# Patient Record
Sex: FEMALE | Race: WHITE | Employment: OTHER | URBAN - METROPOLITAN AREA
[De-identification: names, ages, dates, MRNs, and addresses within clinical notes are randomized per-mention and may not be internally consistent; named-entity substitution may affect disease eponyms.]

---

## 2017-02-02 ENCOUNTER — TELEPHONE (OUTPATIENT)
Dept: ORTHOPEDIC SURGERY | Age: 77
End: 2017-02-02

## 2017-02-02 ENCOUNTER — OFFICE VISIT (OUTPATIENT)
Dept: ORTHOPEDIC SURGERY | Age: 77
End: 2017-02-02

## 2017-02-02 VITALS
SYSTOLIC BLOOD PRESSURE: 128 MMHG | HEART RATE: 97 BPM | HEIGHT: 62 IN | DIASTOLIC BLOOD PRESSURE: 84 MMHG | WEIGHT: 161 LBS | BODY MASS INDEX: 29.63 KG/M2 | RESPIRATION RATE: 16 BRPM | TEMPERATURE: 98.1 F

## 2017-02-02 DIAGNOSIS — Z96.612 S/P REVERSE TOTAL SHOULDER ARTHROPLASTY, LEFT: Primary | ICD-10-CM

## 2017-02-02 DIAGNOSIS — M19.019 ARTHRITIS, SHOULDER REGION: ICD-10-CM

## 2017-02-02 PROBLEM — Z96.619 S/P REVERSE TOTAL SHOULDER ARTHROPLASTY: Status: ACTIVE | Noted: 2017-02-02

## 2017-02-02 PROCEDURE — 1090F PRES/ABSN URINE INCON ASSESS: CPT | Performed by: PHYSICIAN ASSISTANT

## 2017-02-02 PROCEDURE — G8400 PT W/DXA NO RESULTS DOC: HCPCS | Performed by: PHYSICIAN ASSISTANT

## 2017-02-02 PROCEDURE — 3017F COLORECTAL CA SCREEN DOC REV: CPT | Performed by: PHYSICIAN ASSISTANT

## 2017-02-02 PROCEDURE — 73030 X-RAY EXAM OF SHOULDER: CPT | Performed by: PHYSICIAN ASSISTANT

## 2017-02-02 PROCEDURE — G8420 CALC BMI NORM PARAMETERS: HCPCS | Performed by: PHYSICIAN ASSISTANT

## 2017-02-02 PROCEDURE — 99214 OFFICE O/P EST MOD 30 MIN: CPT | Performed by: PHYSICIAN ASSISTANT

## 2017-02-02 PROCEDURE — 1123F ACP DISCUSS/DSCN MKR DOCD: CPT | Performed by: PHYSICIAN ASSISTANT

## 2017-02-02 PROCEDURE — G8484 FLU IMMUNIZE NO ADMIN: HCPCS | Performed by: PHYSICIAN ASSISTANT

## 2017-02-02 PROCEDURE — 4040F PNEUMOC VAC/ADMIN/RCVD: CPT | Performed by: PHYSICIAN ASSISTANT

## 2017-02-02 PROCEDURE — G8428 CUR MEDS NOT DOCUMENT: HCPCS | Performed by: PHYSICIAN ASSISTANT

## 2017-02-02 PROCEDURE — 1036F TOBACCO NON-USER: CPT | Performed by: PHYSICIAN ASSISTANT

## 2017-02-16 ENCOUNTER — HOSPITAL ENCOUNTER (OUTPATIENT)
Dept: CT IMAGING | Age: 77
Discharge: HOME | End: 2017-02-16
Attending: PHYSICIAN ASSISTANT | Admitting: PHYSICIAN ASSISTANT

## 2017-02-16 DIAGNOSIS — M19.90 OSTEOARTHRITIS: ICD-10-CM

## 2017-02-16 DIAGNOSIS — M19.019 ARTHRITIS, SHOULDER REGION: ICD-10-CM

## 2017-03-29 ENCOUNTER — OFFICE VISIT (OUTPATIENT)
Dept: ORTHOPEDIC SURGERY | Age: 77
End: 2017-03-29

## 2017-03-29 VITALS
SYSTOLIC BLOOD PRESSURE: 105 MMHG | HEART RATE: 103 BPM | HEIGHT: 62 IN | BODY MASS INDEX: 29.63 KG/M2 | WEIGHT: 161 LBS | DIASTOLIC BLOOD PRESSURE: 65 MMHG | TEMPERATURE: 96 F

## 2017-03-29 DIAGNOSIS — M19.011 ARTHRITIS OF RIGHT SHOULDER REGION: Primary | ICD-10-CM

## 2017-03-29 PROCEDURE — G8400 PT W/DXA NO RESULTS DOC: HCPCS | Performed by: ORTHOPAEDIC SURGERY

## 2017-03-29 PROCEDURE — G8427 DOCREV CUR MEDS BY ELIG CLIN: HCPCS | Performed by: ORTHOPAEDIC SURGERY

## 2017-03-29 PROCEDURE — 1036F TOBACCO NON-USER: CPT | Performed by: ORTHOPAEDIC SURGERY

## 2017-03-29 PROCEDURE — 1090F PRES/ABSN URINE INCON ASSESS: CPT | Performed by: ORTHOPAEDIC SURGERY

## 2017-03-29 PROCEDURE — 1123F ACP DISCUSS/DSCN MKR DOCD: CPT | Performed by: ORTHOPAEDIC SURGERY

## 2017-03-29 PROCEDURE — G8484 FLU IMMUNIZE NO ADMIN: HCPCS | Performed by: ORTHOPAEDIC SURGERY

## 2017-03-29 PROCEDURE — G8420 CALC BMI NORM PARAMETERS: HCPCS | Performed by: ORTHOPAEDIC SURGERY

## 2017-03-29 PROCEDURE — 99214 OFFICE O/P EST MOD 30 MIN: CPT | Performed by: ORTHOPAEDIC SURGERY

## 2017-03-29 PROCEDURE — 4040F PNEUMOC VAC/ADMIN/RCVD: CPT | Performed by: ORTHOPAEDIC SURGERY

## 2017-03-29 PROCEDURE — 3017F COLORECTAL CA SCREEN DOC REV: CPT | Performed by: ORTHOPAEDIC SURGERY

## 2017-04-05 ENCOUNTER — HOSPITAL ENCOUNTER (OUTPATIENT)
Dept: PREADMISSION TESTING | Age: 77
Discharge: HOME | End: 2017-04-05
Attending: ORTHOPAEDIC SURGERY | Admitting: ORTHOPAEDIC SURGERY

## 2017-04-05 LAB
ANION GAP SERPL CALCULATED.3IONS-SCNC: 15 MMOL/L (ref 3–16)
APTT: 28.8 SEC (ref 21–31.8)
BACTERIA: ABNORMAL /HPF
BASOPHILS ABSOLUTE: 0 K/UL (ref 0–0.2)
BASOPHILS RELATIVE PERCENT: 0.3 %
BILIRUBIN URINE: ABNORMAL
BLOOD, URINE: NEGATIVE
BUN BLDV-MCNC: 22 MG/DL (ref 7–20)
CALCIUM SERPL-MCNC: 9.4 MG/DL (ref 8.3–10.6)
CASTS: ABNORMAL /LPF
CHLORIDE BLD-SCNC: 100 MMOL/L (ref 99–110)
CLARITY: ABNORMAL
CO2: 25 MMOL/L (ref 21–32)
COLOR: YELLOW
COMMENT UA: ABNORMAL
CREAT SERPL-MCNC: 0.8 MG/DL (ref 0.6–1.2)
EKG ATRIAL RATE: 91 BPM
EKG DIAGNOSIS: NORMAL
EKG P AXIS: 5 DEGREES
EKG P-R INTERVAL: 100 MS
EKG Q-T INTERVAL: 394 MS
EKG QRS DURATION: 82 MS
EKG QTC CALCULATION (BAZETT): 484 MS
EKG R AXIS: -38 DEGREES
EKG T AXIS: 36 DEGREES
EKG VENTRICULAR RATE: 91 BPM
EOSINOPHILS ABSOLUTE: 0.1 K/UL (ref 0–0.6)
EOSINOPHILS RELATIVE PERCENT: 2 %
EPITHELIAL CELLS, UA: 10 /HPF (ref 0–5)
ESTIMATED AVERAGE GLUCOSE: 119.8 MG/DL
GFR AFRICAN AMERICAN: >60
GFR NON-AFRICAN AMERICAN: >60
GLUCOSE BLD-MCNC: 108 MG/DL (ref 70–99)
GLUCOSE URINE: NEGATIVE MG/DL
HBA1C MFR BLD: 5.8 %
HCT VFR BLD CALC: 39.1 % (ref 36–48)
HEMOGLOBIN: 12.9 G/DL (ref 12–16)
INR BLD: 0.96 (ref 0.85–1.15)
KETONES, URINE: NEGATIVE MG/DL
LEUKOCYTE ESTERASE, URINE: ABNORMAL
LYMPHOCYTES ABSOLUTE: 2 K/UL (ref 1–5.1)
LYMPHOCYTES RELATIVE PERCENT: 37.6 %
MCH RBC QN AUTO: 35.6 PG (ref 26–34)
MCHC RBC AUTO-ENTMCNC: 33 G/DL (ref 31–36)
MCV RBC AUTO: 107.9 FL (ref 80–100)
MICROSCOPIC EXAMINATION: YES
MONOCYTES ABSOLUTE: 0.5 K/UL (ref 0–1.3)
MONOCYTES RELATIVE PERCENT: 9.3 %
NEUTROPHILS ABSOLUTE: 2.7 K/UL (ref 1.7–7.7)
NEUTROPHILS RELATIVE PERCENT: 50.8 %
NITRITE, URINE: NEGATIVE
PDW BLD-RTO: 15.4 % (ref 12.4–15.4)
PH UA: 5.5
PLATELET # BLD: 185 K/UL (ref 135–450)
PMV BLD AUTO: 7.7 FL (ref 5–10.5)
POTASSIUM SERPL-SCNC: 4.9 MMOL/L (ref 3.5–5.1)
PROTEIN UA: NEGATIVE MG/DL
PROTHROMBIN TIME: 10.8 SEC (ref 9.6–13)
RBC # BLD: 3.63 M/UL (ref 4–5.2)
RBC UA: 2 /HPF (ref 0–4)
SEDIMENTATION RATE, ERYTHROCYTE: 40 MM/HR (ref 0–30)
SODIUM BLD-SCNC: 140 MMOL/L (ref 136–145)
SPECIFIC GRAVITY UA: >1.03
URINE REFLEX TO CULTURE: YES
URINE TYPE: ABNORMAL
UROBILINOGEN, URINE: 0.2 E.U./DL
WBC # BLD: 5.3 K/UL (ref 4–11)
WBC UA: 12 /HPF (ref 0–5)

## 2017-04-05 PROCEDURE — 93010 ELECTROCARDIOGRAM REPORT: CPT | Performed by: INTERNAL MEDICINE

## 2017-04-06 ENCOUNTER — TELEPHONE (OUTPATIENT)
Dept: ORTHOPEDIC SURGERY | Age: 77
End: 2017-04-06

## 2017-04-06 ENCOUNTER — SURG/PROC ORDERS (OUTPATIENT)
Dept: ANESTHESIOLOGY | Age: 77
End: 2017-04-06

## 2017-04-06 ENCOUNTER — PAT TELEPHONE (OUTPATIENT)
Dept: PREADMISSION TESTING | Age: 77
End: 2017-04-06

## 2017-04-06 VITALS — WEIGHT: 161 LBS | BODY MASS INDEX: 29.63 KG/M2 | HEIGHT: 62 IN

## 2017-04-06 LAB
ANTIBODY IDENTIFICATION: NORMAL
MRSA SCREEN RT-PCR: NORMAL

## 2017-04-06 RX ORDER — OXYCODONE HYDROCHLORIDE 5 MG/1
10 TABLET ORAL ONCE
Status: CANCELLED | OUTPATIENT
Start: 2017-04-11

## 2017-04-06 RX ORDER — OXYCODONE HYDROCHLORIDE AND ACETAMINOPHEN 5; 325 MG/1; MG/1
1 TABLET ORAL EVERY 4 HOURS PRN
Status: ON HOLD | COMMUNITY
End: 2017-05-02 | Stop reason: HOSPADM

## 2017-04-06 RX ORDER — SODIUM CHLORIDE 0.9 % (FLUSH) 0.9 %
10 SYRINGE (ML) INJECTION PRN
Status: CANCELLED | OUTPATIENT
Start: 2017-04-06

## 2017-04-06 RX ORDER — SODIUM CHLORIDE 9 MG/ML
INJECTION, SOLUTION INTRAVENOUS CONTINUOUS
Status: CANCELLED | OUTPATIENT
Start: 2017-04-06

## 2017-04-06 RX ORDER — CYCLOBENZAPRINE HCL 5 MG
5 TABLET ORAL 3 TIMES DAILY
COMMUNITY
End: 2018-08-02 | Stop reason: ALTCHOICE

## 2017-04-06 RX ORDER — SODIUM CHLORIDE 0.9 % (FLUSH) 0.9 %
10 SYRINGE (ML) INJECTION EVERY 12 HOURS SCHEDULED
Status: CANCELLED | OUTPATIENT
Start: 2017-04-06

## 2017-04-07 LAB — URINE CULTURE, ROUTINE: NORMAL

## 2017-04-11 ENCOUNTER — HOSPITAL ENCOUNTER (OUTPATIENT)
Dept: SURGERY | Age: 77
Discharge: HOME | End: 2017-04-06
Admitting: ORTHOPAEDIC SURGERY

## 2017-04-11 ENCOUNTER — HOSPITAL ENCOUNTER (OUTPATIENT)
Dept: SURGERY | Age: 77
Discharge: HOME | End: 2017-04-30
Attending: ORTHOPAEDIC SURGERY | Admitting: ORTHOPAEDIC SURGERY

## 2017-04-12 LAB — BLOOD BANK REF CASE: NORMAL

## 2017-04-25 ENCOUNTER — HOSPITAL ENCOUNTER (OUTPATIENT)
Dept: PREADMISSION TESTING | Age: 77
Discharge: HOME | End: 2017-04-25
Attending: ORTHOPAEDIC SURGERY | Admitting: ORTHOPAEDIC SURGERY

## 2017-04-25 LAB
ANION GAP SERPL CALCULATED.3IONS-SCNC: 18 MMOL/L (ref 3–16)
APTT: 29.3 SEC (ref 21–31.8)
BACTERIA: ABNORMAL /HPF
BASOPHILS ABSOLUTE: 0 K/UL (ref 0–0.2)
BASOPHILS RELATIVE PERCENT: 0.4 %
BILIRUBIN URINE: NEGATIVE
BLOOD, URINE: NEGATIVE
BUN BLDV-MCNC: 20 MG/DL (ref 7–20)
CALCIUM SERPL-MCNC: 9.4 MG/DL (ref 8.3–10.6)
CHLORIDE BLD-SCNC: 100 MMOL/L (ref 99–110)
CLARITY: ABNORMAL
CO2: 23 MMOL/L (ref 21–32)
COLOR: YELLOW
COMMENT UA: ABNORMAL
CREAT SERPL-MCNC: 0.9 MG/DL (ref 0.6–1.2)
EOSINOPHILS ABSOLUTE: 0.1 K/UL (ref 0–0.6)
EOSINOPHILS RELATIVE PERCENT: 2.3 %
EPITHELIAL CELLS, UA: 3 /HPF (ref 0–5)
ESTIMATED AVERAGE GLUCOSE: 119.8 MG/DL
GFR AFRICAN AMERICAN: >60
GFR NON-AFRICAN AMERICAN: >60
GLUCOSE BLD-MCNC: 107 MG/DL (ref 70–99)
GLUCOSE URINE: NEGATIVE MG/DL
HBA1C MFR BLD: 5.8 %
HCT VFR BLD CALC: 39.4 % (ref 36–48)
HEMOGLOBIN: 13 G/DL (ref 12–16)
INR BLD: 1 (ref 0.85–1.15)
KETONES, URINE: NEGATIVE MG/DL
LEUKOCYTE ESTERASE, URINE: ABNORMAL
LYMPHOCYTES ABSOLUTE: 2.3 K/UL (ref 1–5.1)
LYMPHOCYTES RELATIVE PERCENT: 40.5 %
MCH RBC QN AUTO: 35.7 PG (ref 26–34)
MCHC RBC AUTO-ENTMCNC: 32.9 G/DL (ref 31–36)
MCV RBC AUTO: 108.5 FL (ref 80–100)
MICROSCOPIC EXAMINATION: YES
MONOCYTES ABSOLUTE: 0.5 K/UL (ref 0–1.3)
MONOCYTES RELATIVE PERCENT: 8.4 %
NEUTROPHILS ABSOLUTE: 2.7 K/UL (ref 1.7–7.7)
NEUTROPHILS RELATIVE PERCENT: 48.4 %
NITRITE, URINE: POSITIVE
PDW BLD-RTO: 15.3 % (ref 12.4–15.4)
PH UA: 5.5
PLATELET # BLD: 186 K/UL (ref 135–450)
PMV BLD AUTO: 8.3 FL (ref 5–10.5)
POTASSIUM SERPL-SCNC: 4.4 MMOL/L (ref 3.5–5.1)
PROTEIN UA: NEGATIVE MG/DL
PROTHROMBIN TIME: 11.3 SEC (ref 9.6–13)
RBC # BLD: 3.63 M/UL (ref 4–5.2)
RBC UA: 3 /HPF (ref 0–4)
SEDIMENTATION RATE, ERYTHROCYTE: 29 MM/HR (ref 0–30)
SODIUM BLD-SCNC: 141 MMOL/L (ref 136–145)
SPECIFIC GRAVITY UA: 1.02
URINE REFLEX TO CULTURE: YES
URINE TYPE: ABNORMAL
UROBILINOGEN, URINE: 0.2 E.U./DL
WBC # BLD: 5.6 K/UL (ref 4–11)
WBC UA: 46 /HPF (ref 0–5)

## 2017-04-26 LAB — MRSA SCREEN RT-PCR: NORMAL

## 2017-04-27 LAB
ORGANISM: ABNORMAL
ORGANISM: ABNORMAL
URINE CULTURE, ROUTINE: ABNORMAL
URINE CULTURE, ROUTINE: ABNORMAL

## 2017-04-28 ENCOUNTER — PAT TELEPHONE (OUTPATIENT)
Dept: PREADMISSION TESTING | Age: 77
End: 2017-04-28

## 2017-04-28 ENCOUNTER — TELEPHONE (OUTPATIENT)
Dept: ORTHOPEDIC SURGERY | Age: 77
End: 2017-04-28

## 2017-04-28 VITALS — BODY MASS INDEX: 25.69 KG/M2 | WEIGHT: 145 LBS | HEIGHT: 63 IN

## 2017-05-01 RX ORDER — PANTOPRAZOLE SODIUM 40 MG/1
40 GRANULE, DELAYED RELEASE ORAL DAILY
COMMUNITY
End: 2019-11-21 | Stop reason: DRUGHIGH

## 2017-05-01 RX ORDER — IPRATROPIUM BROMIDE AND ALBUTEROL SULFATE 2.5; .5 MG/3ML; MG/3ML
1 SOLUTION RESPIRATORY (INHALATION) EVERY 4 HOURS PRN
Status: ON HOLD | COMMUNITY
End: 2019-06-19

## 2017-05-01 RX ORDER — PIOGLITAZONEHYDROCHLORIDE 30 MG/1
30 TABLET ORAL DAILY
COMMUNITY
End: 2018-08-02 | Stop reason: ALTCHOICE

## 2017-05-01 RX ORDER — CIPROFLOXACIN 500 MG/1
500 TABLET, FILM COATED ORAL 2 TIMES DAILY
COMMUNITY
End: 2018-03-01 | Stop reason: ALTCHOICE

## 2017-05-02 PROBLEM — M19.011 ARTHRITIS OF SHOULDER REGION, RIGHT: Status: ACTIVE | Noted: 2017-05-02

## 2017-05-04 ENCOUNTER — TELEPHONE (OUTPATIENT)
Dept: ORTHOPEDIC SURGERY | Age: 77
End: 2017-05-04

## 2017-05-15 ENCOUNTER — TELEPHONE (OUTPATIENT)
Dept: ORTHOPEDIC SURGERY | Age: 77
End: 2017-05-15

## 2017-05-15 ENCOUNTER — OFFICE VISIT (OUTPATIENT)
Dept: ORTHOPEDIC SURGERY | Age: 77
End: 2017-05-15

## 2017-05-15 VITALS — WEIGHT: 153 LBS | BODY MASS INDEX: 27.11 KG/M2 | HEIGHT: 63 IN | TEMPERATURE: 98.1 F | RESPIRATION RATE: 16 BRPM

## 2017-05-15 DIAGNOSIS — Z96.611 S/P REVERSE TOTAL SHOULDER ARTHROPLASTY, RIGHT: Primary | ICD-10-CM

## 2017-05-15 PROCEDURE — 99024 POSTOP FOLLOW-UP VISIT: CPT | Performed by: ORTHOPAEDIC SURGERY

## 2017-06-12 ENCOUNTER — OFFICE VISIT (OUTPATIENT)
Dept: ORTHOPEDIC SURGERY | Age: 77
End: 2017-06-12

## 2017-06-12 VITALS — TEMPERATURE: 97 F

## 2017-06-12 DIAGNOSIS — Z96.611 S/P REVERSE TOTAL SHOULDER ARTHROPLASTY, RIGHT: Primary | ICD-10-CM

## 2017-06-12 PROCEDURE — 99024 POSTOP FOLLOW-UP VISIT: CPT | Performed by: ORTHOPAEDIC SURGERY

## 2017-07-24 ENCOUNTER — OFFICE VISIT (OUTPATIENT)
Dept: ORTHOPEDIC SURGERY | Age: 77
End: 2017-07-24

## 2017-07-24 VITALS — BODY MASS INDEX: 27.11 KG/M2 | HEIGHT: 63 IN | TEMPERATURE: 98.1 F | WEIGHT: 153 LBS

## 2017-07-24 DIAGNOSIS — Z96.611 S/P REVERSE TOTAL SHOULDER ARTHROPLASTY, RIGHT: Primary | ICD-10-CM

## 2017-07-24 PROCEDURE — 99024 POSTOP FOLLOW-UP VISIT: CPT | Performed by: PHYSICIAN ASSISTANT

## 2017-08-02 ENCOUNTER — TELEPHONE (OUTPATIENT)
Dept: ORTHOPEDIC SURGERY | Age: 77
End: 2017-08-02

## 2018-05-25 ENCOUNTER — OFFICE VISIT (OUTPATIENT)
Dept: ORTHOPEDIC SURGERY | Age: 78
End: 2018-05-25

## 2018-05-25 VITALS
DIASTOLIC BLOOD PRESSURE: 87 MMHG | BODY MASS INDEX: 25.76 KG/M2 | WEIGHT: 140 LBS | SYSTOLIC BLOOD PRESSURE: 136 MMHG | HEIGHT: 62 IN | HEART RATE: 76 BPM

## 2018-05-25 DIAGNOSIS — M17.11 PRIMARY OSTEOARTHRITIS OF RIGHT KNEE: Primary | ICD-10-CM

## 2018-05-25 PROCEDURE — 1123F ACP DISCUSS/DSCN MKR DOCD: CPT | Performed by: ORTHOPAEDIC SURGERY

## 2018-05-25 PROCEDURE — G8417 CALC BMI ABV UP PARAM F/U: HCPCS | Performed by: ORTHOPAEDIC SURGERY

## 2018-05-25 PROCEDURE — 1090F PRES/ABSN URINE INCON ASSESS: CPT | Performed by: ORTHOPAEDIC SURGERY

## 2018-05-25 PROCEDURE — 1036F TOBACCO NON-USER: CPT | Performed by: ORTHOPAEDIC SURGERY

## 2018-05-25 PROCEDURE — 4040F PNEUMOC VAC/ADMIN/RCVD: CPT | Performed by: ORTHOPAEDIC SURGERY

## 2018-05-25 PROCEDURE — G8427 DOCREV CUR MEDS BY ELIG CLIN: HCPCS | Performed by: ORTHOPAEDIC SURGERY

## 2018-05-25 PROCEDURE — 20610 DRAIN/INJ JOINT/BURSA W/O US: CPT | Performed by: ORTHOPAEDIC SURGERY

## 2018-05-25 PROCEDURE — G8400 PT W/DXA NO RESULTS DOC: HCPCS | Performed by: ORTHOPAEDIC SURGERY

## 2018-05-25 PROCEDURE — 99214 OFFICE O/P EST MOD 30 MIN: CPT | Performed by: ORTHOPAEDIC SURGERY

## 2018-05-25 RX ORDER — BETAMETHASONE SODIUM PHOSPHATE AND BETAMETHASONE ACETATE 3; 3 MG/ML; MG/ML
12 INJECTION, SUSPENSION INTRA-ARTICULAR; INTRALESIONAL; INTRAMUSCULAR; SOFT TISSUE ONCE
Status: DISCONTINUED | OUTPATIENT
Start: 2018-05-25 | End: 2018-10-26 | Stop reason: HOSPADM

## 2018-08-02 ENCOUNTER — OFFICE VISIT (OUTPATIENT)
Dept: INTERNAL MEDICINE CLINIC | Age: 78
End: 2018-08-02

## 2018-08-02 VITALS
DIASTOLIC BLOOD PRESSURE: 80 MMHG | WEIGHT: 160 LBS | HEIGHT: 59 IN | BODY MASS INDEX: 32.25 KG/M2 | SYSTOLIC BLOOD PRESSURE: 102 MMHG | HEART RATE: 78 BPM

## 2018-08-02 DIAGNOSIS — Z78.0 POST-MENOPAUSAL: ICD-10-CM

## 2018-08-02 DIAGNOSIS — F39 MOOD DISORDER (HCC): ICD-10-CM

## 2018-08-02 DIAGNOSIS — E11.9 TYPE 2 DIABETES MELLITUS WITHOUT COMPLICATION, WITHOUT LONG-TERM CURRENT USE OF INSULIN (HCC): Primary | ICD-10-CM

## 2018-08-02 DIAGNOSIS — F33.41 RECURRENT MAJOR DEPRESSIVE DISORDER, IN PARTIAL REMISSION (HCC): ICD-10-CM

## 2018-08-02 DIAGNOSIS — D64.9 ANEMIA, UNSPECIFIED TYPE: ICD-10-CM

## 2018-08-02 DIAGNOSIS — M17.11 ARTHRITIS OF RIGHT KNEE: ICD-10-CM

## 2018-08-02 DIAGNOSIS — Z23 NEED FOR PROPHYLACTIC VACCINATION AGAINST STREPTOCOCCUS PNEUMONIAE (PNEUMOCOCCUS): ICD-10-CM

## 2018-08-02 DIAGNOSIS — E78.00 HIGH CHOLESTEROL: ICD-10-CM

## 2018-08-02 DIAGNOSIS — E11.9 TYPE 2 DIABETES MELLITUS WITHOUT COMPLICATION, WITHOUT LONG-TERM CURRENT USE OF INSULIN (HCC): ICD-10-CM

## 2018-08-02 DIAGNOSIS — M50.30 DDD (DEGENERATIVE DISC DISEASE), CERVICAL: ICD-10-CM

## 2018-08-02 LAB
A/G RATIO: 1.7 (ref 1.1–2.2)
ALBUMIN SERPL-MCNC: 4.4 G/DL (ref 3.4–5)
ALP BLD-CCNC: 64 U/L (ref 40–129)
ALT SERPL-CCNC: 10 U/L (ref 10–40)
ANION GAP SERPL CALCULATED.3IONS-SCNC: 18 MMOL/L (ref 3–16)
AST SERPL-CCNC: 18 U/L (ref 15–37)
BILIRUB SERPL-MCNC: 0.4 MG/DL (ref 0–1)
BUN BLDV-MCNC: 20 MG/DL (ref 7–20)
CALCIUM SERPL-MCNC: 9.7 MG/DL (ref 8.3–10.6)
CHLORIDE BLD-SCNC: 96 MMOL/L (ref 99–110)
CHOLESTEROL, TOTAL: 129 MG/DL (ref 0–199)
CO2: 25 MMOL/L (ref 21–32)
CREAT SERPL-MCNC: 1 MG/DL (ref 0.6–1.2)
FERRITIN: 36.3 NG/ML (ref 15–150)
FOLATE: 6.44 NG/ML (ref 4.78–24.2)
GFR AFRICAN AMERICAN: >60
GFR NON-AFRICAN AMERICAN: 54
GLOBULIN: 2.6 G/DL
GLUCOSE BLD-MCNC: 93 MG/DL (ref 70–99)
HCT VFR BLD CALC: 39.5 % (ref 36–48)
HDLC SERPL-MCNC: 63 MG/DL (ref 40–60)
HEMOGLOBIN: 13.2 G/DL (ref 12–16)
IRON SATURATION: 23 % (ref 15–50)
IRON: 75 UG/DL (ref 37–145)
LDL CHOLESTEROL CALCULATED: 42 MG/DL
MCH RBC QN AUTO: 34.7 PG (ref 26–34)
MCHC RBC AUTO-ENTMCNC: 33.3 G/DL (ref 31–36)
MCV RBC AUTO: 104.2 FL (ref 80–100)
PDW BLD-RTO: 16.1 % (ref 12.4–15.4)
PLATELET # BLD: 122 K/UL (ref 135–450)
PMV BLD AUTO: 8.9 FL (ref 5–10.5)
POTASSIUM SERPL-SCNC: 4.5 MMOL/L (ref 3.5–5.1)
RBC # BLD: 3.79 M/UL (ref 4–5.2)
SODIUM BLD-SCNC: 139 MMOL/L (ref 136–145)
TOTAL IRON BINDING CAPACITY: 329 UG/DL (ref 260–445)
TOTAL PROTEIN: 7 G/DL (ref 6.4–8.2)
TRIGL SERPL-MCNC: 121 MG/DL (ref 0–150)
TSH REFLEX: 0.89 UIU/ML (ref 0.27–4.2)
VITAMIN B-12: 661 PG/ML (ref 211–911)
VLDLC SERPL CALC-MCNC: 24 MG/DL
WBC # BLD: 5.8 K/UL (ref 4–11)

## 2018-08-02 PROCEDURE — G0009 ADMIN PNEUMOCOCCAL VACCINE: HCPCS | Performed by: INTERNAL MEDICINE

## 2018-08-02 PROCEDURE — G8427 DOCREV CUR MEDS BY ELIG CLIN: HCPCS | Performed by: INTERNAL MEDICINE

## 2018-08-02 PROCEDURE — 90670 PCV13 VACCINE IM: CPT | Performed by: INTERNAL MEDICINE

## 2018-08-02 PROCEDURE — 1090F PRES/ABSN URINE INCON ASSESS: CPT | Performed by: INTERNAL MEDICINE

## 2018-08-02 PROCEDURE — G8417 CALC BMI ABV UP PARAM F/U: HCPCS | Performed by: INTERNAL MEDICINE

## 2018-08-02 PROCEDURE — G8400 PT W/DXA NO RESULTS DOC: HCPCS | Performed by: INTERNAL MEDICINE

## 2018-08-02 PROCEDURE — 1101F PT FALLS ASSESS-DOCD LE1/YR: CPT | Performed by: INTERNAL MEDICINE

## 2018-08-02 PROCEDURE — 4040F PNEUMOC VAC/ADMIN/RCVD: CPT | Performed by: INTERNAL MEDICINE

## 2018-08-02 PROCEDURE — 99204 OFFICE O/P NEW MOD 45 MIN: CPT | Performed by: INTERNAL MEDICINE

## 2018-08-02 PROCEDURE — 1036F TOBACCO NON-USER: CPT | Performed by: INTERNAL MEDICINE

## 2018-08-02 PROCEDURE — 1123F ACP DISCUSS/DSCN MKR DOCD: CPT | Performed by: INTERNAL MEDICINE

## 2018-08-02 RX ORDER — TRAZODONE HYDROCHLORIDE 50 MG/1
25 TABLET ORAL NIGHTLY
Status: ON HOLD | COMMUNITY
End: 2019-06-19

## 2018-08-02 RX ORDER — ESCITALOPRAM OXALATE 10 MG/1
10 TABLET ORAL DAILY
COMMUNITY

## 2018-08-02 RX ORDER — ATORVASTATIN CALCIUM 20 MG/1
10 TABLET, FILM COATED ORAL DAILY
COMMUNITY
End: 2019-11-21 | Stop reason: DRUGHIGH

## 2018-08-02 RX ORDER — CALCIUM CARBONATE 500(1250)
250 TABLET ORAL 2 TIMES DAILY
Status: ON HOLD | COMMUNITY
End: 2019-06-19

## 2018-08-02 RX ORDER — OXYCODONE HYDROCHLORIDE 5 MG/1
5 TABLET ORAL EVERY 8 HOURS PRN
COMMUNITY
End: 2018-09-14 | Stop reason: ALTCHOICE

## 2018-08-02 RX ORDER — DIVALPROEX SODIUM 125 MG/1
125 TABLET, DELAYED RELEASE ORAL 2 TIMES DAILY
Status: ON HOLD | COMMUNITY
End: 2019-06-19

## 2018-08-02 RX ORDER — RISPERIDONE 0.25 MG/1
0.25 TABLET, FILM COATED ORAL NIGHTLY
Status: ON HOLD | COMMUNITY
End: 2019-11-22 | Stop reason: HOSPADM

## 2018-08-02 ASSESSMENT — ENCOUNTER SYMPTOMS
SHORTNESS OF BREATH: 0
PHOTOPHOBIA: 0
WHEEZING: 0
ABDOMINAL PAIN: 0
BACK PAIN: 1
VOICE CHANGE: 0
TROUBLE SWALLOWING: 0

## 2018-08-02 NOTE — PROGRESS NOTES
unspecified type diabetes mellitus without mention of complication, not stated as uncontrolled     UTI (urinary tract infection)     Vitamin D deficiency     Weakness     Wrist fracture     right     Past Surgical History:   Procedure Laterality Date    ABDOMEN SURGERY      ABDOMINAL AORTIC ANEURYSM REPAIR  8/5/2013    aorto bifemoral bypass    APPENDECTOMY      BACK SURGERY      CAROTID ENDARTERECTOMY  5/16/11    right    CHOLECYSTECTOMY      COLONOSCOPY      diverticulitis; bowel surgery    ENDOSCOPY, COLON, DIAGNOSTIC      egd-dilated esoughagous    ERCP  2-1-2013    ercp with stent placement    EYE SURGERY      cataract b/l    FRACTURE SURGERY      left elbow 1992    HYSTERECTOMY      JOINT REPLACEMENT      rt hip and lt knee    JOINT REPLACEMENT Left 09/28/2016    sholuder    KIDNEY STONE SURGERY      removed abcess lt kidney and stone    SHOULDER SURGERY Right 05/02/2017    R reverse TSA with bone grafting of complex glenoid deficiency with biceps tenodesis    TONSILLECTOMY      UPPER GASTROINTESTINAL ENDOSCOPY  4-28-14    VASCULAR SURGERY      WRIST SURGERY Right 2/12/16    ORIF right distal radius     Social History     Social History    Marital status:       Spouse name: N/A    Number of children: 2    Years of education: 15     Social History Main Topics    Smoking status: Former Smoker     Packs/day: 0.10     Years: 50.00     Types: Cigarettes     Quit date: 12/16/2015    Smokeless tobacco: Never Used    Alcohol use No      Comment: rare    Drug use: No    Sexual activity: Not Currently     Other Topics Concern    None     Social History Narrative    None     Family History   Problem Relation Age of Onset    Early Death Father     Substance Abuse Father     High Blood Pressure Sister     High Blood Pressure Brother     Dementia Brother     High Blood Pressure Sister     Diabetes Sister     High Blood Pressure Sister     Diabetes Mother     Cancer Mother normal.   Nursing note and vitals reviewed. CBC:   Lab Results   Component Value Date    WBC 5.3 03/01/2018    HGB 12.2 03/01/2018    HCT 36.5 03/01/2018     03/01/2018     CMP:   Lab Results   Component Value Date     03/01/2018    K 4.0 03/01/2018     03/01/2018    CO2 24 03/01/2018    ANIONGAP 13 03/01/2018    GLUCOSE 100 03/01/2018    BUN 25 03/01/2018    CREATININE 0.7 03/01/2018    GFRAA >60 03/01/2018    GFRAA >60 02/04/2013    CALCIUM 9.1 03/01/2018    PROT 7.5 03/01/2018    PROT 7.0 02/04/2013    LABALBU 4.1 03/01/2018    AGRATIO 1.2 03/01/2018    BILITOT 0.3 03/01/2018    ALKPHOS 135 03/01/2018    ALT 15 03/01/2018    AST 27 03/01/2018    GLOB 3.4 03/01/2018     URINALYSIS:   Lab Results   Component Value Date    GLUCOSEU Negative 03/01/2018    GLUCOSEU NEGATIVE 03/10/2012    KETUA Negative 03/01/2018    SPECGRAV 1.018 03/01/2018    BLOODU Negative 03/01/2018    PHUR 5.5 03/01/2018    PROTEINU Negative 03/01/2018    NITRU Negative 03/01/2018    LEUKOCYTESUR Negative 03/01/2018    LABMICR YES 03/01/2018    URINETYPE Not Specified 03/01/2018     HBA1C:   Lab Results   Component Value Date    LABA1C 5.7 05/02/2017    .9 05/02/2017     MICRO/ALB:   Lab Results   Component Value Date    LABMICR YES 03/01/2018    LABCREA 44 02/04/2013    MALBCR 13.2 02/04/2013     LIPID:   Lab Results   Component Value Date    CHOL 235 03/25/2015    TRIG 104 03/25/2015    HDL 75 03/25/2015    LDLCALC 139 03/25/2015    LABVLDL 21 03/25/2015     TSH:   Lab Results   Component Value Date    TSHREFLEX 1.13 03/25/2014     I have reviewed patient's immediate past pertinent old medical record from Chart and Care Everywhere. ASSESSMENT/PLAN:    Lashon Pressley was seen today for establish care, knee pain and other. Diagnoses and all orders for this visit:    Type 2 diabetes mellitus without complication, without long-term current use of insulin (HCC)  -     CBC;  Future  -     Comprehensive Metabolic Panel;

## 2018-08-03 LAB
ESTIMATED AVERAGE GLUCOSE: 111.2 MG/DL
HBA1C MFR BLD: 5.5 %

## 2018-08-22 ENCOUNTER — HOSPITAL ENCOUNTER (OUTPATIENT)
Dept: WOMENS IMAGING | Age: 78
Discharge: HOME | End: 2018-08-22
Attending: INTERNAL MEDICINE | Admitting: INTERNAL MEDICINE

## 2018-08-22 DIAGNOSIS — Z78.0 POST-MENOPAUSAL: ICD-10-CM

## 2018-08-22 DIAGNOSIS — Z78.0 ASYMPTOMATIC MENOPAUSAL STATE: ICD-10-CM

## 2018-08-24 RX ORDER — ALENDRONATE SODIUM 70 MG/1
70 TABLET ORAL
Qty: 4 TABLET | Refills: 0 | OUTPATIENT
Start: 2018-08-24

## 2018-08-31 ENCOUNTER — OFFICE VISIT (OUTPATIENT)
Dept: ORTHOPEDIC SURGERY | Age: 78
End: 2018-08-31

## 2018-08-31 ENCOUNTER — TELEPHONE (OUTPATIENT)
Dept: INTERNAL MEDICINE CLINIC | Age: 78
End: 2018-08-31

## 2018-08-31 VITALS — HEIGHT: 62 IN | BODY MASS INDEX: 26.68 KG/M2 | WEIGHT: 145 LBS

## 2018-08-31 DIAGNOSIS — M17.11 PRIMARY OSTEOARTHRITIS OF RIGHT KNEE: Primary | ICD-10-CM

## 2018-08-31 PROCEDURE — 1123F ACP DISCUSS/DSCN MKR DOCD: CPT | Performed by: ORTHOPAEDIC SURGERY

## 2018-08-31 PROCEDURE — 4040F PNEUMOC VAC/ADMIN/RCVD: CPT | Performed by: ORTHOPAEDIC SURGERY

## 2018-08-31 PROCEDURE — G8427 DOCREV CUR MEDS BY ELIG CLIN: HCPCS | Performed by: ORTHOPAEDIC SURGERY

## 2018-08-31 PROCEDURE — 99213 OFFICE O/P EST LOW 20 MIN: CPT | Performed by: ORTHOPAEDIC SURGERY

## 2018-08-31 PROCEDURE — 1036F TOBACCO NON-USER: CPT | Performed by: ORTHOPAEDIC SURGERY

## 2018-08-31 PROCEDURE — 1090F PRES/ABSN URINE INCON ASSESS: CPT | Performed by: ORTHOPAEDIC SURGERY

## 2018-08-31 PROCEDURE — G8399 PT W/DXA RESULTS DOCUMENT: HCPCS | Performed by: ORTHOPAEDIC SURGERY

## 2018-08-31 PROCEDURE — 1101F PT FALLS ASSESS-DOCD LE1/YR: CPT | Performed by: ORTHOPAEDIC SURGERY

## 2018-08-31 PROCEDURE — G8417 CALC BMI ABV UP PARAM F/U: HCPCS | Performed by: ORTHOPAEDIC SURGERY

## 2018-08-31 RX ORDER — TRAMADOL HYDROCHLORIDE 50 MG/1
50 TABLET ORAL EVERY 6 HOURS PRN
Qty: 50 TABLET | Refills: 0 | Status: SHIPPED | OUTPATIENT
Start: 2018-08-31 | End: 2018-09-07

## 2018-08-31 NOTE — TELEPHONE ENCOUNTER
Chris Ibarra from [facility] ---pt saw Dr Fauzai Read today and will be having surgery on knee ---he gave her PRN Tramadol---they have PRN order from you on oxycodone---they want to know which med you want her to have----please call Chris Ibarra at [phone].   Thanks. (pt has had history of med shopping--they just wanted you to know this)

## 2018-08-31 NOTE — PROGRESS NOTES
Patient Name: Citlalli Land  Medical Record Number: [id number]  [place]: 1940  Date of Encounter: 8/31/2018     Chief Complaint   Patient presents with    Follow-up     Right knee cortisone injection 5/25/18. History of Present Illness:   Ms. Citlalli Land is here in follow up regarding her right knee pain. Patient has known severe osteoarthritis of the right knee with worsening pain that is starting to affect her activities of daily living. Patient was last seen on 5/25/2018. She was given a cortisone injection at that time which she states lasted for 9 days. She states pain is starting to keep her awake at night. She is using her rolling walker. She occasionally gets swelling of the right knee. She is living at assisted living at the [facility]. The patient's past medical history, medications, allergies, family history, social history, and review of systems have been reviewed, and dated and are recorded in the chart under the 'MEDIA\" tab. Physical Exam:    Ms. Citlalli Land appears well, she is in no apparent distress, she demonstrates appropriate mood & affect. She is alert and oriented to person, place and time. Ht 5' 1.5\" (1.562 m)   Wt 145 lb (65.8 kg)   BMI 26.95 kg/m²     On examination of patient's right knee there is a mild joint effusion. She has tenderness on palpation of the medial and lateral joint line however has more tenderness on palpation of the medial joint line. Range of motion is 0-115° however has pain with extension past -10° and flexion past 90°. Orders:  Orders Placed This Encounter   Procedures    Ambulatory referral to Physical Therapy       Impression:   Diagnosis Orders   1. Primary osteoarthritis of right knee  traMADol (ULTRAM) 50 MG tablet    Ambulatory referral to Physical Therapy       Treatment Plan:    Patient is ready for a right total knee arthroplasty. She has tried conservative interventions without relief of her right knee pain.   Her

## 2018-08-31 NOTE — LETTER
[facility]  [address]  [address]  [address]  Phone: [phone]  Fax: [phone]    Ronnie Murray        August 31, 2018     Patient: Charlie Rich   YOB: 1940   Date of Visit: 8/31/2018       To Whom It May Concern: It is my medical opinion that Yael Sanchez requires a disability parking placard for the following reasons:  She has limited walking ability due to an orthopedic condition. Duration of need: 1 year    If you have any questions or concerns, please don't hesitate to call.     Sincerely,          Meghana Carter PA-C

## 2018-09-04 ENCOUNTER — TELEPHONE (OUTPATIENT)
Dept: ORTHOPEDIC SURGERY | Age: 78
End: 2018-09-04

## 2018-09-14 ENCOUNTER — OFFICE VISIT (OUTPATIENT)
Dept: INTERNAL MEDICINE CLINIC | Age: 78
End: 2018-09-14

## 2018-09-14 ENCOUNTER — TELEPHONE (OUTPATIENT)
Dept: INTERNAL MEDICINE CLINIC | Age: 78
End: 2018-09-14

## 2018-09-14 VITALS
HEART RATE: 84 BPM | WEIGHT: 159 LBS | DIASTOLIC BLOOD PRESSURE: 88 MMHG | SYSTOLIC BLOOD PRESSURE: 120 MMHG | HEIGHT: 62 IN | BODY MASS INDEX: 29.26 KG/M2

## 2018-09-14 DIAGNOSIS — I38 VALVULAR HEART DISEASE: ICD-10-CM

## 2018-09-14 DIAGNOSIS — Z01.818 PRE-OP EVALUATION: ICD-10-CM

## 2018-09-14 DIAGNOSIS — M17.11 ARTHRITIS OF KNEE, RIGHT: Primary | ICD-10-CM

## 2018-09-14 DIAGNOSIS — R94.31 ABNORMAL EKG: ICD-10-CM

## 2018-09-14 DIAGNOSIS — E11.9 TYPE 2 DIABETES MELLITUS WITHOUT COMPLICATION, WITHOUT LONG-TERM CURRENT USE OF INSULIN (HCC): ICD-10-CM

## 2018-09-14 PROCEDURE — 1090F PRES/ABSN URINE INCON ASSESS: CPT | Performed by: INTERNAL MEDICINE

## 2018-09-14 PROCEDURE — G8399 PT W/DXA RESULTS DOCUMENT: HCPCS | Performed by: INTERNAL MEDICINE

## 2018-09-14 PROCEDURE — 1123F ACP DISCUSS/DSCN MKR DOCD: CPT | Performed by: INTERNAL MEDICINE

## 2018-09-14 PROCEDURE — 93000 ELECTROCARDIOGRAM COMPLETE: CPT | Performed by: INTERNAL MEDICINE

## 2018-09-14 PROCEDURE — 1101F PT FALLS ASSESS-DOCD LE1/YR: CPT | Performed by: INTERNAL MEDICINE

## 2018-09-14 PROCEDURE — 99214 OFFICE O/P EST MOD 30 MIN: CPT | Performed by: INTERNAL MEDICINE

## 2018-09-14 PROCEDURE — 4040F PNEUMOC VAC/ADMIN/RCVD: CPT | Performed by: INTERNAL MEDICINE

## 2018-09-14 PROCEDURE — 1036F TOBACCO NON-USER: CPT | Performed by: INTERNAL MEDICINE

## 2018-09-14 PROCEDURE — G8428 CUR MEDS NOT DOCUMENT: HCPCS | Performed by: INTERNAL MEDICINE

## 2018-09-14 PROCEDURE — G8417 CALC BMI ABV UP PARAM F/U: HCPCS | Performed by: INTERNAL MEDICINE

## 2018-09-14 RX ORDER — TRAMADOL HYDROCHLORIDE 50 MG/1
50 TABLET ORAL EVERY 6 HOURS PRN
Status: ON HOLD | COMMUNITY
End: 2018-10-26 | Stop reason: HOSPADM

## 2018-09-14 NOTE — TELEPHONE ENCOUNTER
Called and lm on vm--pt will needs to get her lab the hospital. EKG done here. Will need echo and cardiac clearance. 9-24 surgery with Dr. Guerrero Or may need to be delayes. Celso Sierra getting POA paperwork today. Please use him as contact--pt with memory issues. [phone].

## 2018-09-14 NOTE — PROGRESS NOTES
Preoperative Consultation      Jailyn Hercules  YOB: 1940    Date of Service:  9/14/2018    Vitals:    09/14/18 1316 09/14/18 1325   BP: (!) 154/102 120/88   Site: Left Upper Arm Left Upper Arm   Position: Sitting Sitting   Cuff Size: Medium Adult Medium Adult   Pulse: 88 84   Weight: 159 lb (72.1 kg)    Height: 5' 1.5\" (1.562 m)       Wt Readings from Last 2 Encounters:   09/14/18 159 lb (72.1 kg)   09/07/18 151 lb (68.5 kg)     BP Readings from Last 3 Encounters:   09/14/18 120/88   08/02/18 102/80   05/25/18 136/87        Chief Complaint   Patient presents with   [employer] Pre-op Exam     rt knee replacement-using tramadol only--asking for new script. Allergies   Allergen Reactions    Clindamycin/Lincomycin Diarrhea     Hx of C.diff, Hx of fecal transplant, clindamycin contraindicated always.  Penicillins Hives    Ambien [Zolpidem Tartrate] Other (See Comments)     confusion    Chantix [Varenicline]      Visual and auditory hallucinations    Pentazocine Lactate Other (See Comments)     Severe headache    Sulfa Antibiotics Hives    Zetia [Ezetimibe] Itching     Outpatient Prescriptions Marked as Taking for the 9/14/18 encounter (Office Visit) with Masha Milton MD   Medication Sig Dispense Refill    traMADol (ULTRAM) 50 MG tablet Take 50 mg by mouth every 6 hours as needed for Pain. Humberto Soto hydrochlorothiazide (HYDRODIURIL) 25 MG tablet Take 25 mg by mouth daily Patient not sure of dose      alendronate (FOSAMAX) 70 MG tablet Take 1 tablet by mouth every 7 days 4 tablet 0    atorvastatin (LIPITOR) 20 MG tablet Take 20 mg by mouth daily      calcium carbonate (OSCAL) 500 MG TABS tablet Take 250 mg by mouth 2 times daily      divalproex (DEPAKOTE) 125 MG DR tablet Take 125 mg by mouth 2 times daily      escitalopram (LEXAPRO) 10 MG tablet Take 10 mg by mouth daily      guaiFENesin (ROBITUSSIN) 100 MG/5ML liquid Take 200 mg by mouth 4 times daily as needed for Cough      metFORMIN Status: poor  Patient Active Problem List   Diagnosis    Diarrhea    Hypokalemia    Carotid bruit    Clostridium difficile colitis    COPD (chronic obstructive pulmonary disease) ([address]    Cataract    Fall    Confusion    Abdominal pain    Vomiting    Liver function test abnormality    Abdominal aortic aneurysm dissection (HCC)    Arthritis, shoulder region    Mental status change    DDD (degenerative disc disease), cervical    Colitis    Urinary tract infection    Gastritis    Dehydration    Delirium    Sepsis ([address]    Lower urinary tract infectious disease    2/12/16 ORIF distal radius    Wrist fracture    High cholesterol    DM2 [facility]diabetes mellitus, type 2) ([facility])    Macrocytosis without anemia    Arthritis of left shoulder region    Biceps tendonitis on left    S/p reverse total shoulder arthroplasty    Arthritis of shoulder region, right    Mood disorder ([facility])       Past Medical History:   Diagnosis Date    Abdominal aneurysm ([address]     Anesthesia complication     pt got confused after anes    Aortic aneurysm ([address]     unspecified site    Arthritis     At risk for falls 09/11/2018    per pt and pt's son subjective reports     Ataxia     Avascular necrosis ([address]     bilateral shoulders    Back pain     Blood transfusion 2003, 2009    during knee and hip replacement    Chronic kidney disease     multiple calcium kidney stones 10 years ago    Clostridium difficile infection 08/2011; 1/21/13 4/29/14; 6/25/14    Colitis due to Clostridium difficile 4-    Confusion 5/18/2012    COPD (chronic obstructive pulmonary disease) ([address]     Diabetes ([address]     Diabetes mellitus ([address]     Diarrhea     Dysuria     Fracture of right acetabulum (HCC)     posterior column    GERD (gastroesophageal reflux disease)     H/O migraine     Hyperlipidemia     Hypertension     Liver disease     Major depressive disorder     Neuropathy     Other disorders of kidney and ureter kidney stones    Pneumonia     2009    Shoulder (girdle) dystocia during labor and deliver, delivered     Type II or unspecified type diabetes mellitus without mention of complication, not stated as uncontrolled     UTI (urinary tract infection)     Vitamin D deficiency     Weakness     Wrist fracture     right     Past Surgical History:   Procedure Laterality Date    ABDOMEN SURGERY      ABDOMINAL AORTIC ANEURYSM REPAIR  8/5/2013    aorto bifemoral bypass    APPENDECTOMY      BACK SURGERY      CAROTID ENDARTERECTOMY  5/16/11    right    CHOLECYSTECTOMY      COLONOSCOPY      diverticulitis; bowel surgery    ENDOSCOPY, COLON, DIAGNOSTIC      egd-dilated esoughagous    ERCP  2-1-2013    ercp with stent placement    EYE SURGERY      cataract b/l    FRACTURE SURGERY      left elbow 1992    HYSTERECTOMY      JOINT REPLACEMENT      rt hip and lt knee    JOINT REPLACEMENT Left 09/28/2016    sholuder    KIDNEY STONE SURGERY      removed abcess lt kidney and stone    SHOULDER SURGERY Right 05/02/2017    R reverse TSA with bone grafting of complex glenoid deficiency with biceps tenodesis    TONSILLECTOMY      UPPER GASTROINTESTINAL ENDOSCOPY  4-28-14    VASCULAR SURGERY      WRIST SURGERY Right 2/12/16    ORIF right distal radius     Family History   Problem Relation Age of Onset    Early Death Father     Substance Abuse Father     High Blood Pressure Sister     High Blood Pressure Brother     Dementia Brother     High Blood Pressure Sister     Diabetes Sister     High Blood Pressure Sister     Diabetes Mother     Cancer Mother      Social History     Social History    Marital status:      Spouse name: N/A    Number of children: 2    Years of education: 15     Occupational History    Not on file.      Social History Main Topics    Smoking status: Former Smoker     Packs/day: 0.10     Years: 50.00     Types: Cigarettes     Quit date: 12/16/2015    Smokeless tobacco: Never

## 2018-09-24 ASSESSMENT — PROMIS GLOBAL HEALTH SCALE
IN GENERAL, HOW WOULD YOU RATE YOUR MENTAL HEALTH, INCLUDING YOUR MOOD AND YOUR ABILITY TO THINK [ON A SCALE OF 1 (POOR) TO 5 (EXCELLENT)]?: 4
IN GENERAL, WOULD YOU SAY YOUR HEALTH IS...[ON A SCALE OF 1 (POOR) TO 5 (EXCELLENT)]: 4
SUM OF RESPONSES TO QUESTIONS 3, 6, 7, & 8: 16
IN THE PAST 7 DAYS, HOW OFTEN HAVE YOU BEEN BOTHERED BY EMOTIONAL PROBLEMS, SUCH AS FEELING ANXIOUS, DEPRESSED, OR IRRITABLE [ON A SCALE FROM 1 (NEVER) TO 5 (ALWAYS)]?: 3
IN GENERAL, WOULD YOU SAY YOUR QUALITY OF LIFE IS...[ON A SCALE OF 1 (POOR) TO 5 (EXCELLENT)]: 4
HOW IS THE PROMIS V1.1 BEING ADMINISTERED?: 0
TO WHAT EXTENT ARE YOU ABLE TO CARRY OUT YOUR EVERYDAY PHYSICAL ACTIVITIES SUCH AS WALKING, CLIMBING STAIRS, CARRYING GROCERIES, OR MOVING A CHAIR [ON A SCALE OF 1 (NOT AT ALL) TO 5 (COMPLETELY)]?: 1
WHO IS THE PERSON COMPLETING THE PROMIS V1.1 SURVEY?: 0
IN GENERAL, HOW WOULD YOU RATE YOUR PHYSICAL HEALTH [ON A SCALE OF 1 (POOR) TO 5 (EXCELLENT)]?: 4
SUM OF RESPONSES TO QUESTIONS 2, 4, 5, & 10: 13
IN THE PAST 7 DAYS, HOW WOULD YOU RATE YOUR PAIN ON AVERAGE [ON A SCALE FROM 0 (NO PAIN) TO 10 (WORST IMAGINABLE PAIN)]?: 8
IN THE PAST 7 DAYS, HOW WOULD YOU RATE YOUR FATIGUE ON AVERAGE [ON A SCALE FROM 1 (NONE) TO 5 (VERY SEVERE)]?: 3
IN GENERAL, PLEASE RATE HOW WELL YOU CARRY OUT YOUR USUAL SOCIAL ACTIVITIES (INCLUDES ACTIVITIES AT HOME, AT WORK, AND IN YOUR COMMUNITY, AND RESPONSIBILITIES AS A PARENT, CHILD, SPOUSE, EMPLOYEE, FRIEND, ETC) [ON A SCALE OF 1 (POOR) TO 5 (EXCELLENT)]?: 4
IN GENERAL, HOW WOULD YOU RATE YOUR SATISFACTION WITH YOUR SOCIAL ACTIVITIES AND RELATIONSHIPS [ON A SCALE OF 1 (POOR) TO 5 (EXCELLENT)]?: 2

## 2018-09-25 ENCOUNTER — OFFICE VISIT (OUTPATIENT)
Dept: CARDIOLOGY CLINIC | Age: 78
End: 2018-09-25
Payer: MEDICARE

## 2018-09-25 VITALS
DIASTOLIC BLOOD PRESSURE: 74 MMHG | OXYGEN SATURATION: 92 % | WEIGHT: 133.5 LBS | SYSTOLIC BLOOD PRESSURE: 98 MMHG | HEIGHT: 63 IN | HEART RATE: 100 BPM | BODY MASS INDEX: 23.65 KG/M2

## 2018-09-25 DIAGNOSIS — I71.02 ABDOMINAL AORTIC ANEURYSM DISSECTION (HCC): Primary | ICD-10-CM

## 2018-09-25 DIAGNOSIS — I71.40 ABDOMINAL AORTIC ANEURYSM DISSECTION (HCC): Primary | ICD-10-CM

## 2018-09-25 DIAGNOSIS — I15.9 SECONDARY HYPERTENSION: ICD-10-CM

## 2018-09-25 DIAGNOSIS — Z01.810 PREOP CARDIOVASCULAR EXAM: ICD-10-CM

## 2018-09-25 DIAGNOSIS — I10 HYPERTENSION, UNSPECIFIED TYPE: ICD-10-CM

## 2018-09-25 PROCEDURE — G8420 CALC BMI NORM PARAMETERS: HCPCS | Performed by: INTERNAL MEDICINE

## 2018-09-25 PROCEDURE — 1101F PT FALLS ASSESS-DOCD LE1/YR: CPT | Performed by: INTERNAL MEDICINE

## 2018-09-25 PROCEDURE — 99204 OFFICE O/P NEW MOD 45 MIN: CPT | Performed by: INTERNAL MEDICINE

## 2018-09-25 PROCEDURE — G8427 DOCREV CUR MEDS BY ELIG CLIN: HCPCS | Performed by: INTERNAL MEDICINE

## 2018-09-25 PROCEDURE — 1090F PRES/ABSN URINE INCON ASSESS: CPT | Performed by: INTERNAL MEDICINE

## 2018-09-25 NOTE — LETTER
[address] [facility]  [address]  [address] [place] [address]  Phone: [phone]  Fax: [phone]    Daniel Laws MD        September 26, 2018     [address] [place] [address]  [address] [facility] [address]    Patient: Bhvana Chavez  MR Number: [id number]  YOB: 1940  Date of Visit: 9/25/2018    Dear  [address] [place] [address]:    Thank you for the request for consultation for Marbella Mensah to me for the evaluation of cardiac clearance Below are the relevant portions of my assessment and plan of care. [address]   Cardiac Followup    Referring Provider:  [address] [place] [address]     Chief Complaint   Patient presents with   Vijay Dennison Doctor     no cardiac complaints at this time    Other     AAA, valvular HD    Cardiac Clearance     knee replacement Dr Shannen Voss 10/2        History of Present Illness:  Mrs Jess Reed is a 70 y.o. female seen as a new patient for cardiac clearance for right knee replacement 10/2/18  She has a history of  aortobifem bypass,htn,hchol, carotid endarterectomy. Today,  She is here with her son. She presents in a wheelchair. She has no chest pain, change in exertional shortness  Of breath palpitations or dizziness. She has no change In exercise tolerance in the past 6 months . Her activity is limited        Past Medical History:   has a past medical history of Abdominal aneurysm ([address] Anesthesia complication; Aortic aneurysm ([address] Arthritis; At risk for falls; Ataxia; Avascular necrosis ([address] Back pain; Blood transfusion; Chronic kidney disease; Clostridium difficile infection; Colitis due to Clostridium difficile; Confusion; COPD (chronic obstructive pulmonary disease) ([address] Diabetes ([address] Diabetes mellitus ([address] Diarrhea; Dysuria; Fracture of right acetabulum (HCC); GERD (gastroesophageal reflux disease); H/O migraine; Hyperlipidemia;  Hypertension; Liver disease; Major depressive Clindamycin/lincomycin; Penicillins; Ambien [zolpidem tartrate]; Chantix [varenicline]; Pentazocine lactate; Sulfa antibiotics; and Zetia [ezetimibe]     Review of Systems:   · Constitutional: there has been no unanticipated weight loss. There's been no change in energy level, sleep pattern, or activity level. · Eyes: No visual changes or diplopia. No scleral icterus. · ENT: No Headaches, hearing loss or vertigo. No mouth sores or sore throat. · Cardiovascular: Reviewed in HPI  · Respiratory: No cough or wheezing, no sputum production. No hematemesis. · Gastrointestinal: No abdominal pain, appetite loss, blood in stools. No change in bowel or bladder habits. · Genitourinary: No dysuria, trouble voiding, or hematuria. · Musculoskeletal:  No gait disturbance, weakness or joint complaints. · Integumentary: No rash or pruritis. · Neurological: No headache, diplopia, change in muscle strength, numbness or tingling. No change in gait, balance, coordination, mood, affect, memory, mentation, behavior. · Psychiatric: No anxiety, no depression. · Endocrine: No malaise, fatigue or temperature intolerance. No excessive thirst, fluid intake, or urination. No tremor. · Hematologic/Lymphatic: No abnormal bruising or bleeding, blood clots or swollen lymph nodes. · Allergic/Immunologic: No nasal congestion or hives. Physical Examination:    Vitals:    09/25/18 1435   BP: 98/74   Pulse: 100   SpO2: 92%        Constitutional and General Appearance:   . NAD   SKIN:  .     Warm and dry  EYES:    .     EOMI  Neck:   . Normal carotid contour  Respiratory:  Normal excursion and expansion without use of accessory muscles  Resp Auscultation: Normal breath sounds without dullness  Cardiovascular:   The apical impulses not displaced  Heart tones are crisp and normal  Cervical veins are not engorged  Normal O8Y3, No S3, diastolic Murmur  Peripheral pulses are symmetrical and full  Extremities: There is no clubbing, cyanosis of the extremities. No edema  Femoral Arteries: 2+ and equal  Pedal Pulses: 2+ and equal   Abdomen:  No masses or tenderness  Liver/Spleen: No Abnormalities Noted  Neurological/Psychiatric:  Alert and oriented in all spheres  Moves all extremities well  Exhibits normal gait balance and coordination  No abnormalities of mood, affect, memory       8/5/13  Echo--Normal left ventricle size, wall thickness and systolic function with an  estimated ejection fraction of 40-45%, there is abnormal motion of the  septal wall.   There is reversal of E/A inflow velocities across the mitral valve suggesting impaired left ventricular relaxation.   Trivial aortic regurgitation is present. There is mild mitral and tricuspid regurgitation with RVSP estimated at 40   mmHg. Mild-to-moderate pulmonic regurgitation present.   Catheter wire noted in right heart.    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  -Inferior infarct -probably not recent  -Poor R-wave progression -may be secondary to pulmonary disease   consider old anterior infarct. 6/13  Carotid dopplers--right 1-15% left 16-49%      3/1/18 CT of abd--  Abdominal aortic aneurysm measuring maximally 3.9 cm, mildly increased in   size since 2015       Assessment:   preop exam--lexiscan and echo prior -okay to proceed  If testing unremarkable    Abnormal ekg--evaluate with a lexiscan    htn-BP 98/74 (Site: Left Upper Arm, Position: Sitting, Cuff Size: Medium Adult)   Pulse 100   Ht 5' 3\" (1.6 m)   Wt 133 lb 8 oz (60.6 kg)   SpO2 92%   BMI 23.65 kg/m²        hchol--8/18 tc 129 tri 121 hdl 63 ldl 42    AAA--3/18 AAA 3.9, recommend repeat in    [facility] and  Echo-if ok may proceed with orthopedic surgery    Thank you for allowing me to participate in the care of this individual.    Rosio Mayorga M.D., [facility] [place].     Provider Maddie Burris is working as a scribe for and in the presence of me

## 2018-09-25 NOTE — PROGRESS NOTES
bladder habits. · Genitourinary: No dysuria, trouble voiding, or hematuria. · Musculoskeletal:  No gait disturbance, weakness or joint complaints. · Integumentary: No rash or pruritis. · Neurological: No headache, diplopia, change in muscle strength, numbness or tingling. No change in gait, balance, coordination, mood, affect, memory, mentation, behavior. · Psychiatric: No anxiety, no depression. · Endocrine: No malaise, fatigue or temperature intolerance. No excessive thirst, fluid intake, or urination. No tremor. · Hematologic/Lymphatic: No abnormal bruising or bleeding, blood clots or swollen lymph nodes. · Allergic/Immunologic: No nasal congestion or hives. Physical Examination:    Vitals:    09/25/18 1435   BP: 98/74   Pulse: 100   SpO2: 92%        Constitutional and General Appearance:   . NAD   SKIN:  .     Warm and dry  EYES:    .     EOMI  Neck:   . Normal carotid contour  Respiratory:  Normal excursion and expansion without use of accessory muscles  Resp Auscultation: Normal breath sounds without dullness  Cardiovascular: The apical impulses not displaced  Heart tones are crisp and normal  Cervical veins are not engorged  Normal H5B1, No S3, diastolic Murmur  Peripheral pulses are symmetrical and full  Extremities: There is no clubbing, cyanosis of the extremities.   No edema  Femoral Arteries: 2+ and equal  Pedal Pulses: 2+ and equal   Abdomen:  No masses or tenderness  Liver/Spleen: No Abnormalities Noted  Neurological/Psychiatric:  Alert and oriented in all spheres  Moves all extremities well  Exhibits normal gait balance and coordination  No abnormalities of mood, affect, memory       8/5/13  Echo--Normal left ventricle size, wall thickness and systolic function with an  estimated ejection fraction of 40-45%, there is abnormal motion of the  septal wall.   There is reversal of E/A inflow velocities across the mitral valve suggesting impaired left ventricular relaxation.  [place] Luque aortic regurgitation is present. There is mild mitral and tricuspid regurgitation with RVSP estimated at 40   mmHg. Mild-to-moderate pulmonic regurgitation present.   Catheter wire noted in right heart.    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  -Inferior infarct -probably not recent  -Poor R-wave progression -may be secondary to pulmonary disease   consider old anterior infarct. 6/13  Carotid dopplers--right 1-15% left 16-49%      3/1/18 CT of abd--  Abdominal aortic aneurysm measuring maximally 3.9 cm, mildly increased in   size since 2015       Assessment:   preop exam--lexiscan and echo prior -okay to proceed  If testing unremarkable    Abnormal ekg--evaluate with a lexiscan    htn-BP 98/74 (Site: Left Upper Arm, Position: Sitting, Cuff Size: Medium Adult)   Pulse 100   Ht 5' 3\" (1.6 m)   Wt 133 lb 8 oz (60.6 kg)   SpO2 92%   BMI 23.65 kg/m²       hchol--8/18 tc 129 tri 121 hdl 63 ldl 42    AAA--3/18 AAA 3.9, recommend repeat in    [address] [facility] [address] and  Echo-if ok may proceed with orthopedic surgery    Thank you for allowing me to participate in the care of this individual.    Katelyn Walls M.D., [facility] [place]. Provider Daren Payton is working as a scribe for and in the presence of me Moi Rice MD). Working as a scribe, Jonnathan Mccormick may have prepopulated components of this note with my historical  intellectual property under my direct supervision. Any additions to this intellectual property were performed in my presence and at my direction. Furthermore, the content and accuracy of this note have been reviewed by me Moi Rice MD).

## 2018-09-26 NOTE — COMMUNICATION BODY
[address]   Cardiac Followup    Referring Provider:  Nam Lorenzana     Chief Complaint   Patient presents with   Mercedez Mancera Doctor     no cardiac complaints at this time    Other     AAA, valvular HD    Cardiac Clearance     knee replacement Dr Alison Ross 10/2        History of Present Illness:  Mrs Carlos Alberto Araiza is a 70 y.o. female seen as a new patient for cardiac clearance for right knee replacement 10/2/18  She has a history of  aortobifem bypass,htn,hchol, carotid endarterectomy. Today,  She is here with her son. She presents in a wheelchair. She has no chest pain, change in exertional shortness  Of breath palpitations or dizziness. She has no change In exercise tolerance in the past 6 months . Her activity is limited        Past Medical History:   has a past medical history of Abdominal aneurysm ([address] Anesthesia complication; Aortic aneurysm ([address] Arthritis; At risk for falls; Ataxia; Avascular necrosis ([address] Back pain; Blood transfusion; Chronic kidney disease; Clostridium difficile infection; Colitis due to Clostridium difficile; Confusion; COPD (chronic obstructive pulmonary disease) ([address] Diabetes ([address] Diabetes mellitus ([address] Diarrhea; Dysuria; Fracture of right acetabulum (HCC); GERD (gastroesophageal reflux disease); H/O migraine; Hyperlipidemia; Hypertension; Liver disease; Major depressive disorder; Neuropathy; Other disorders of kidney and ureter; Pneumonia; Shoulder (girdle) dystocia during labor and deliver, delivered; Type II or unspecified type diabetes mellitus without mention of complication, not stated as uncontrolled; UTI (urinary tract infection); Vitamin D deficiency; Weakness; and Wrist fracture. Surgical History:   has a past surgical history that includes Kidney stone surgery; Hysterectomy; Tonsillectomy; back surgery; Carotid endarterectomy (5/16/11); Colonoscopy; Abdomen surgery; fracture surgery; Appendectomy (1960);  Cholecystectomy; vascular surgery; eye surgery; ERCP (2-1-2013); Abdominal aortic aneurysm repair (8/5/2013); Upper gastrointestinal endoscopy (4-28-14); Wrist surgery (Right, 2/12/16); joint replacement; joint replacement (Left, 09/28/2016); Endoscopy, colon, diagnostic; and shoulder surgery (Right, 05/02/2017). Social History:   reports that she quit smoking about 2 years ago. Her smoking use included Cigarettes. She has a 5.00 pack-year smoking history. She has never used smokeless tobacco. She reports that she does not drink alcohol or use drugs. Family History:  family history includes Cancer in her mother; Dementia in her brother; Diabetes in her mother and sister; Early Death in her father; High Blood Pressure in her brother, sister, sister, and sister; Substance Abuse in her father. Home Medications:  Current Outpatient Prescriptions   Medication Sig Dispense Refill    traMADol (ULTRAM) 50 MG tablet Take 50 mg by mouth every 6 hours as needed for Pain. Orval Opitz hydrochlorothiazide (HYDRODIURIL) 25 MG tablet Take 25 mg by mouth daily Patient not sure of dose      alendronate (FOSAMAX) 70 MG tablet Take 1 tablet by mouth every 7 days 4 tablet 0    atorvastatin (LIPITOR) 20 MG tablet Take 20 mg by mouth daily      calcium carbonate (OSCAL) 500 MG TABS tablet Take 250 mg by mouth 2 times daily      divalproex (DEPAKOTE) 125 MG DR tablet Take 125 mg by mouth 2 times daily      escitalopram (LEXAPRO) 10 MG tablet Take 10 mg by mouth daily      guaiFENesin (ROBITUSSIN) 100 MG/5ML liquid Take 200 mg by mouth 4 times daily as needed for Cough      metFORMIN (GLUCOPHAGE) 500 MG tablet Take 500 mg by mouth 2 times daily (with meals)      risperiDONE (RISPERDAL) 0.25 MG tablet Take 0.25 mg by mouth nightly      traZODone (DESYREL) 50 MG tablet Take 25 mg by mouth nightly      aluminum & magnesium hydroxide-simethicone (MAALOX) 200-200-20 MG/5ML SUSP suspension Take 30 mLs by mouth every 4 hours as needed for Indigestion      nystatin (MYCOSTATIN) 211751 UNIT/GM powder Apply topically 2 times daily Apply topically 4 times daily.  ondansetron (ZOFRAN) 4 MG tablet Take 4 mg by mouth every 6 hours as needed for Nausea or Vomiting      DiphenhydrAMINE HCl (BENADRYL PO) Take 1 tablet by mouth every 8 hours as needed itching      ipratropium-albuterol (DUONEB) 0.5-2.5 (3) MG/3ML SOLN nebulizer solution Inhale 1 vial into the lungs every 4 hours as needed for Shortness of Breath      pantoprazole sodium (PROTONIX) 40 MG PACK packet Take 40 mg by mouth daily      acetaminophen (TYLENOL) 500 MG tablet Take 2 tablets by mouth every 6 hours as needed for Pain or Fever 120 tablet 0    docusate sodium (COLACE) 100 MG capsule Take 100 mg by mouth daily      magnesium hydroxide (MILK OF MAGNESIA) 400 MG/5ML suspension Take 30 mLs by mouth daily as needed for Constipation      LYRICA 75 MG capsule TAKE ONE CAPSULE BY MOUTH TWICE DAILY 180 capsule 0    allopurinol (ZYLOPRIM) 300 MG tablet TAKE 1 TABLET BY MOUTH ONCE DAILY 90 tablet 0     Current Facility-Administered Medications   Medication Dose Route Frequency Provider Last Rate Last Dose    betamethasone acetate-betamethasone sodium phosphate (CELESTONE) injection 12 mg  12 mg Intra-articular Once Chris Velazquez MD           Allergies:  Clindamycin/lincomycin; Penicillins; Ambien [zolpidem tartrate]; Chantix [varenicline]; Pentazocine lactate; Sulfa antibiotics; and Zetia [ezetimibe]     Review of Systems:   · Constitutional: there has been no unanticipated weight loss. There's been no change in energy level, sleep pattern, or activity level. · Eyes: No visual changes or diplopia. No scleral icterus. · ENT: No Headaches, hearing loss or vertigo. No mouth sores or sore throat. · Cardiovascular: Reviewed in HPI  · Respiratory: No cough or wheezing, no sputum production. No hematemesis. · Gastrointestinal: No abdominal pain, appetite loss, blood in stools.  No change in bowel or bladder habits. · Genitourinary: No dysuria, trouble voiding, or hematuria. · Musculoskeletal:  No gait disturbance, weakness or joint complaints. · Integumentary: No rash or pruritis. · Neurological: No headache, diplopia, change in muscle strength, numbness or tingling. No change in gait, balance, coordination, mood, affect, memory, mentation, behavior. · Psychiatric: No anxiety, no depression. · Endocrine: No malaise, fatigue or temperature intolerance. No excessive thirst, fluid intake, or urination. No tremor. · Hematologic/Lymphatic: No abnormal bruising or bleeding, blood clots or swollen lymph nodes. · Allergic/Immunologic: No nasal congestion or hives. Physical Examination:    Vitals:    09/25/18 1435   BP: 98/74   Pulse: 100   SpO2: 92%        Constitutional and General Appearance:   . NAD   SKIN:  .     Warm and dry  EYES:    .     EOMI  Neck:   . Normal carotid contour  Respiratory:  Normal excursion and expansion without use of accessory muscles  Resp Auscultation: Normal breath sounds without dullness  Cardiovascular: The apical impulses not displaced  Heart tones are crisp and normal  Cervical veins are not engorged  Normal M9X7, No S3, diastolic Murmur  Peripheral pulses are symmetrical and full  Extremities: There is no clubbing, cyanosis of the extremities.   No edema  Femoral Arteries: 2+ and equal  Pedal Pulses: 2+ and equal   Abdomen:  No masses or tenderness  Liver/Spleen: No Abnormalities Noted  Neurological/Psychiatric:  Alert and oriented in all spheres  Moves all extremities well  Exhibits normal gait balance and coordination  No abnormalities of mood, affect, memory       8/5/13  Echo--Normal left ventricle size, wall thickness and systolic function with an  estimated ejection fraction of 40-45%, there is abnormal motion of the  septal wall.   There is reversal of E/A inflow velocities across the mitral valve suggesting impaired left ventricular relaxation.  Virgin Camera

## 2018-10-05 ENCOUNTER — HOSPITAL ENCOUNTER (OUTPATIENT)
Dept: NON INVASIVE DIAGNOSTICS | Age: 78
Discharge: HOME | End: 2018-10-05
Payer: MEDICARE

## 2018-10-05 DIAGNOSIS — Z01.810 PREOP CARDIOVASCULAR EXAM: ICD-10-CM

## 2018-10-05 DIAGNOSIS — I10 HYPERTENSION, UNSPECIFIED TYPE: ICD-10-CM

## 2018-10-05 LAB
LEFT VENTRICULAR EJECTION FRACTION HIGH VALUE: 60 %
LEFT VENTRICULAR EJECTION FRACTION MODE: NORMAL
LV EF: 55 %
LV EF: 58 %
LV EF: 59 %
LVEF MODALITY: NORMAL
LVEF MODALITY: NORMAL

## 2018-10-05 PROCEDURE — 6360000002 HC RX W HCPCS: Performed by: INTERNAL MEDICINE

## 2018-10-05 PROCEDURE — 93017 CV STRESS TEST TRACING ONLY: CPT | Performed by: INTERNAL MEDICINE

## 2018-10-05 PROCEDURE — A9502 TC99M TETROFOSMIN: HCPCS | Performed by: INTERNAL MEDICINE

## 2018-10-05 PROCEDURE — 3430000000 HC RX DIAGNOSTIC RADIOPHARMACEUTICAL: Performed by: INTERNAL MEDICINE

## 2018-10-05 PROCEDURE — 78452 HT MUSCLE IMAGE SPECT MULT: CPT | Performed by: INTERNAL MEDICINE

## 2018-10-05 PROCEDURE — 93306 TTE W/DOPPLER COMPLETE: CPT

## 2018-10-05 RX ADMIN — REGADENOSON 0.4 MG: 0.08 INJECTION, SOLUTION INTRAVENOUS at 14:30

## 2018-10-05 RX ADMIN — TETROFOSMIN 30 MILLICURIE: 0.23 INJECTION, POWDER, LYOPHILIZED, FOR SOLUTION INTRAVENOUS at 14:30

## 2018-10-05 RX ADMIN — TETROFOSMIN 10 MILLICURIE: 0.23 INJECTION, POWDER, LYOPHILIZED, FOR SOLUTION INTRAVENOUS at 13:12

## 2018-10-05 NOTE — PROGRESS NOTES
Instructed on Lexiscan Stress Test Procedure including possible side effects/ adverse reactions. Patient verbalizes  understanding and denies having any questions . See [address] [place] [address] Rd Cardiology.   Dana Mason RN

## 2018-10-08 ENCOUNTER — TELEPHONE (OUTPATIENT)
Dept: CARDIOLOGY CLINIC | Age: 78
End: 2018-10-08

## 2018-10-08 NOTE — LETTER
[address]  [address] [place] [address] [address] [address]  Phone: [phone]  Fax: [phone]    Sheryl Jimenez MD        October 10, 2018     Patient: Levi Conway   YOB: 1940   Date of Visit: 10/8/2018       To Whom It May Concern: It is my medical opinion that [employer] - Based on the evaluation on and diagnostic testing including stress test and echo,   there are no apparent cardiac contraindications to the proposed procedure using standard anesthetic technique.     There are no apparent interventions to ameliorate the cardiac risk.     This clinical assessment assumes a full Anesthesia evaluation for overall risk of airway management, type and route of anesthetic, and other relevant anesthesia-specific considerations.     Dr Mary Rush MD          If you have any questions or concerns, please don't hesitate to call.     Sincerely,        Sheryl Jimenez MD

## 2018-10-08 NOTE — PROGRESS NOTES
Pre-op instructions along with pre-op skin prep from the [address] [place] [address] faxed to 3rd floor [facility] [phone] with confirmation

## 2018-10-08 NOTE — PROGRESS NOTES
Name_______________________________________Printed:____________________  Date and time of surgery_10/23 1030_______________________Arrival Time:_0830 [facility]_______________  x 1. Do not eat or drink anything after 12 midnight (or____hours) prior to surgery. This includes no water, chewing gum or mints. Endoscopy patients follow your doctors bowel prep instructions,which may include taking part of prep after midnight.  x 2. Take the following pills with a small sip of water on the morning of surgery_depakote lexapro lyrica inhaler as needed____________________________________________________  x 3. Aspirin, Ibuprofen, Advil, Naproxen, Vitamin E and other Anti-inflammatory products should be stopped for 5 days before surgery or as directed by your physician.  x 4. Check with your Doctor regarding stopping Plavix, Coumadin,Eliquis, Lovenox,Effient,Pradaxa,Xarelto, Fragmin or other blood thinners and follow their instructions. x 5. Do not smoke, and do not drink any alcoholic beverages 24 hours prior to surgery. This includes NA Beer. Refrain from the usage of any recreational drugs. x 6. You may brush your teeth and gargle the morning of surgery. DO NOT SWALLOW WATER  x 7. You MUST make arrangements for a responsible adult to stay on site while you are here and take you home after your surgery. You will not be allowed to leave alone or drive yourself home. It is strongly suggested someone stay with you the first 24 hrs. Your surgery will be cancelled if you do not have a ride home. x 8. A parent/legal guardian must accompany a child scheduled for surgery and plan to stay at the hospital until the child is discharged. Please do not bring other children with you.  x 9. Please wear simple, loose fitting clothing to the hospital.  Citlalli Macs not bring valuables (money, credit cards, checkbooks, etc.) Do not wear any makeup (including no eye makeup) or nail polish on your fingers or toes. x         10.  DO NOT wear any 24. If you use oxygen and have a portable tank please bring it  with you the DOS  x           25. Bring a complete list of all your medications with name and dose include any supplements. x          26. Other__On the day of surgery please send with the patient verification of patients NPO status and the date/time all medications were last taken. Please also send the name and number of the transport company bringing patient.________________________________________   *Please call pre admission testing if you any further questions   Aron Diaz 41    [place]. [place]  [phone]   [address] [place] [address]       All above information reviewed with patient in person or by phone. Patient verbalizes understanding. All questions and concerns addressed.                                                                                                  Patient/Rep__faxed to [facility]__________________                                                           Please fax H/P once completed [phone]  50.

## 2018-10-15 NOTE — PROGRESS NOTES
Spoke to Alanna at [facility] they will arrange a H/P to be done at the facility this week by CNP I faxed over PAT orders they will also do those this week they will fax me H/P and all results when available Dr Beltran Barbara office notified

## 2018-10-19 RX ORDER — VANCOMYCIN HYDROCHLORIDE 1 G/200ML
1000 INJECTION, SOLUTION INTRAVENOUS ONCE
Status: CANCELLED | OUTPATIENT
Start: 2018-10-19

## 2018-10-19 RX ORDER — CIPROFLOXACIN 500 MG/1
500 TABLET, FILM COATED ORAL 2 TIMES DAILY
Qty: 20 TABLET | Refills: 0 | Status: ON HOLD | OUTPATIENT
Start: 2018-10-19 | End: 2018-10-26 | Stop reason: HOSPADM

## 2018-10-19 NOTE — PROGRESS NOTES
Demetri Martin from Dr [place] office called back and states Dr Sebastien Rodriguez will treat patient as pos. MRSA I will add vancomycin to preop orders

## 2018-10-23 ENCOUNTER — HOSPITAL ENCOUNTER (INPATIENT)
Age: 78
LOS: 5 days | Discharge: NURSING, REHAB OR LONG-TERM CARE | DRG: 470 | End: 2018-10-28
Attending: ORTHOPAEDIC SURGERY | Admitting: ORTHOPAEDIC SURGERY
Payer: MEDICARE

## 2018-10-23 ENCOUNTER — APPOINTMENT (OUTPATIENT)
Dept: GENERAL RADIOLOGY | Age: 78
DRG: 470 | End: 2018-10-23
Attending: ORTHOPAEDIC SURGERY
Payer: MEDICARE

## 2018-10-23 ENCOUNTER — ANESTHESIA (OUTPATIENT)
Dept: OPERATING ROOM | Age: 78
DRG: 470 | End: 2018-10-23
Payer: MEDICARE

## 2018-10-23 ENCOUNTER — ANESTHESIA EVENT (OUTPATIENT)
Dept: OPERATING ROOM | Age: 78
DRG: 470 | End: 2018-10-23
Payer: MEDICARE

## 2018-10-23 VITALS — DIASTOLIC BLOOD PRESSURE: 58 MMHG | OXYGEN SATURATION: 99 % | SYSTOLIC BLOOD PRESSURE: 128 MMHG

## 2018-10-23 DIAGNOSIS — Z01.818 PREOP TESTING: Primary | ICD-10-CM

## 2018-10-23 DIAGNOSIS — M17.11 PRIMARY OSTEOARTHRITIS OF RIGHT KNEE: ICD-10-CM

## 2018-10-23 LAB
ABO/RH: NORMAL
ANTIBODY SCREEN: NORMAL
DAT IGG CAPTURE: NORMAL
GLUCOSE BLD-MCNC: 102 MG/DL (ref 70–99)
GLUCOSE BLD-MCNC: 122 MG/DL (ref 70–99)
GLUCOSE BLD-MCNC: 154 MG/DL (ref 70–99)
GLUCOSE BLD-MCNC: 189 MG/DL (ref 70–99)
PERFORMED ON: ABNORMAL

## 2018-10-23 PROCEDURE — 6370000000 HC RX 637 (ALT 250 FOR IP): Performed by: ORTHOPAEDIC SURGERY

## 2018-10-23 PROCEDURE — 2580000003 HC RX 258: Performed by: NURSE PRACTITIONER

## 2018-10-23 PROCEDURE — 2500000003 HC RX 250 WO HCPCS: Performed by: ANESTHESIOLOGY

## 2018-10-23 PROCEDURE — G8988 SELF CARE GOAL STATUS: HCPCS

## 2018-10-23 PROCEDURE — 36415 COLL VENOUS BLD VENIPUNCTURE: CPT

## 2018-10-23 PROCEDURE — 7100000001 HC PACU RECOVERY - ADDTL 15 MIN: Performed by: ORTHOPAEDIC SURGERY

## 2018-10-23 PROCEDURE — 3600000014 HC SURGERY LEVEL 4 ADDTL 15MIN: Performed by: ORTHOPAEDIC SURGERY

## 2018-10-23 PROCEDURE — 99024 POSTOP FOLLOW-UP VISIT: CPT | Performed by: NURSE PRACTITIONER

## 2018-10-23 PROCEDURE — 86850 RBC ANTIBODY SCREEN: CPT

## 2018-10-23 PROCEDURE — 97530 THERAPEUTIC ACTIVITIES: CPT

## 2018-10-23 PROCEDURE — 97166 OT EVAL MOD COMPLEX 45 MIN: CPT

## 2018-10-23 PROCEDURE — 6360000002 HC RX W HCPCS: Performed by: ORTHOPAEDIC SURGERY

## 2018-10-23 PROCEDURE — 6370000000 HC RX 637 (ALT 250 FOR IP): Performed by: PHYSICIAN ASSISTANT

## 2018-10-23 PROCEDURE — G8978 MOBILITY CURRENT STATUS: HCPCS

## 2018-10-23 PROCEDURE — 2500000003 HC RX 250 WO HCPCS: Performed by: ORTHOPAEDIC SURGERY

## 2018-10-23 PROCEDURE — 2709999900 HC NON-CHARGEABLE SUPPLY: Performed by: ORTHOPAEDIC SURGERY

## 2018-10-23 PROCEDURE — G8979 MOBILITY GOAL STATUS: HCPCS

## 2018-10-23 PROCEDURE — 2580000003 HC RX 258: Performed by: NURSE ANESTHETIST, CERTIFIED REGISTERED

## 2018-10-23 PROCEDURE — 7100000000 HC PACU RECOVERY - FIRST 15 MIN: Performed by: ORTHOPAEDIC SURGERY

## 2018-10-23 PROCEDURE — 94664 DEMO&/EVAL PT USE INHALER: CPT

## 2018-10-23 PROCEDURE — 6360000002 HC RX W HCPCS: Performed by: ANESTHESIOLOGY

## 2018-10-23 PROCEDURE — 0SRC0J9 REPLACEMENT OF RIGHT KNEE JOINT WITH SYNTHETIC SUBSTITUTE, CEMENTED, OPEN APPROACH: ICD-10-PCS | Performed by: ORTHOPAEDIC SURGERY

## 2018-10-23 PROCEDURE — 1200000000 HC SEMI PRIVATE

## 2018-10-23 PROCEDURE — 51798 US URINE CAPACITY MEASURE: CPT

## 2018-10-23 PROCEDURE — C1776 JOINT DEVICE (IMPLANTABLE): HCPCS | Performed by: ORTHOPAEDIC SURGERY

## 2018-10-23 PROCEDURE — 64447 NJX AA&/STRD FEMORAL NRV IMG: CPT | Performed by: ANESTHESIOLOGY

## 2018-10-23 PROCEDURE — 2580000003 HC RX 258: Performed by: ORTHOPAEDIC SURGERY

## 2018-10-23 PROCEDURE — 86880 COOMBS TEST DIRECT: CPT

## 2018-10-23 PROCEDURE — 97162 PT EVAL MOD COMPLEX 30 MIN: CPT

## 2018-10-23 PROCEDURE — 86901 BLOOD TYPING SEROLOGIC RH(D): CPT

## 2018-10-23 PROCEDURE — C1713 ANCHOR/SCREW BN/BN,TIS/BN: HCPCS | Performed by: ORTHOPAEDIC SURGERY

## 2018-10-23 PROCEDURE — 2500000003 HC RX 250 WO HCPCS: Performed by: NURSE ANESTHETIST, CERTIFIED REGISTERED

## 2018-10-23 PROCEDURE — 6360000002 HC RX W HCPCS: Performed by: NURSE ANESTHETIST, CERTIFIED REGISTERED

## 2018-10-23 PROCEDURE — 86870 RBC ANTIBODY IDENTIFICATION: CPT

## 2018-10-23 PROCEDURE — 2720000010 HC SURG SUPPLY STERILE: Performed by: ORTHOPAEDIC SURGERY

## 2018-10-23 PROCEDURE — G8987 SELF CARE CURRENT STATUS: HCPCS

## 2018-10-23 PROCEDURE — 86900 BLOOD TYPING SEROLOGIC ABO: CPT

## 2018-10-23 PROCEDURE — 3700000000 HC ANESTHESIA ATTENDED CARE: Performed by: ORTHOPAEDIC SURGERY

## 2018-10-23 PROCEDURE — 6360000002 HC RX W HCPCS: Performed by: PHYSICIAN ASSISTANT

## 2018-10-23 PROCEDURE — 73560 X-RAY EXAM OF KNEE 1 OR 2: CPT

## 2018-10-23 PROCEDURE — 3600000004 HC SURGERY LEVEL 4 BASE: Performed by: ORTHOPAEDIC SURGERY

## 2018-10-23 PROCEDURE — 3700000001 HC ADD 15 MINUTES (ANESTHESIA): Performed by: ORTHOPAEDIC SURGERY

## 2018-10-23 PROCEDURE — 86922 COMPATIBILITY TEST ANTIGLOB: CPT

## 2018-10-23 DEVICE — NEXGEN ALL-POLY PATELLA 29MM: Type: IMPLANTABLE DEVICE | Site: KNEE | Status: FUNCTIONAL

## 2018-10-23 DEVICE — IMPLANTABLE DEVICE: Type: IMPLANTABLE DEVICE | Site: KNEE | Status: FUNCTIONAL

## 2018-10-23 DEVICE — DISCONTINUED USE 416978 CEMENT PALACOS R SING DOSE 40GR: Type: IMPLANTABLE DEVICE | Site: KNEE | Status: FUNCTIONAL

## 2018-10-23 DEVICE — NEXGEN PRECOAT STEMMED TIBIAL PLATE SZ 4: Type: IMPLANTABLE DEVICE | Site: KNEE | Status: FUNCTIONAL

## 2018-10-23 RX ORDER — TRAZODONE HYDROCHLORIDE 50 MG/1
25 TABLET ORAL NIGHTLY
Status: DISCONTINUED | OUTPATIENT
Start: 2018-10-23 | End: 2018-10-28 | Stop reason: HOSPADM

## 2018-10-23 RX ORDER — OXYCODONE HYDROCHLORIDE AND ACETAMINOPHEN 5; 325 MG/1; MG/1
1 TABLET ORAL EVERY 4 HOURS PRN
Status: DISCONTINUED | OUTPATIENT
Start: 2018-10-23 | End: 2018-10-28 | Stop reason: HOSPADM

## 2018-10-23 RX ORDER — ACETAMINOPHEN 325 MG/1
650 TABLET ORAL EVERY 4 HOURS PRN
Status: DISCONTINUED | OUTPATIENT
Start: 2018-10-23 | End: 2018-10-27

## 2018-10-23 RX ORDER — OXYCODONE HYDROCHLORIDE AND ACETAMINOPHEN 5; 325 MG/1; MG/1
2 TABLET ORAL EVERY 4 HOURS PRN
Status: DISCONTINUED | OUTPATIENT
Start: 2018-10-23 | End: 2018-10-28 | Stop reason: HOSPADM

## 2018-10-23 RX ORDER — LABETALOL HYDROCHLORIDE 5 MG/ML
5 INJECTION, SOLUTION INTRAVENOUS EVERY 10 MIN PRN
Status: DISCONTINUED | OUTPATIENT
Start: 2018-10-23 | End: 2018-10-23 | Stop reason: HOSPADM

## 2018-10-23 RX ORDER — ATORVASTATIN CALCIUM 10 MG/1
10 TABLET, FILM COATED ORAL NIGHTLY
Status: DISCONTINUED | OUTPATIENT
Start: 2018-10-23 | End: 2018-10-28 | Stop reason: HOSPADM

## 2018-10-23 RX ORDER — SODIUM CHLORIDE 0.9 % (FLUSH) 0.9 %
10 SYRINGE (ML) INJECTION PRN
Status: DISCONTINUED | OUTPATIENT
Start: 2018-10-23 | End: 2018-10-27

## 2018-10-23 RX ORDER — ACETAMINOPHEN 80 MG
TABLET,CHEWABLE ORAL
Status: COMPLETED
Start: 2018-10-23 | End: 2018-10-23

## 2018-10-23 RX ORDER — CEFAZOLIN SODIUM 2 G/100ML
2 INJECTION, SOLUTION INTRAVENOUS
Status: COMPLETED | OUTPATIENT
Start: 2018-10-23 | End: 2018-10-23

## 2018-10-23 RX ORDER — BACITRACIN 50000 [USP'U]/1
INJECTION, POWDER, LYOPHILIZED, FOR SOLUTION INTRAMUSCULAR
Status: COMPLETED | OUTPATIENT
Start: 2018-10-23 | End: 2018-10-23

## 2018-10-23 RX ORDER — DOCUSATE SODIUM 100 MG/1
100 CAPSULE, LIQUID FILLED ORAL DAILY
Status: DISCONTINUED | OUTPATIENT
Start: 2018-10-23 | End: 2018-10-28 | Stop reason: HOSPADM

## 2018-10-23 RX ORDER — MAGNESIUM HYDROXIDE/ALUMINUM HYDROXICE/SIMETHICONE 120; 1200; 1200 MG/30ML; MG/30ML; MG/30ML
30 SUSPENSION ORAL EVERY 4 HOURS PRN
Status: DISCONTINUED | OUTPATIENT
Start: 2018-10-23 | End: 2018-10-28 | Stop reason: HOSPADM

## 2018-10-23 RX ORDER — LIDOCAINE HYDROCHLORIDE 10 MG/ML
0.5 INJECTION, SOLUTION EPIDURAL; INFILTRATION; INTRACAUDAL; PERINEURAL ONCE
Status: DISCONTINUED | OUTPATIENT
Start: 2018-10-23 | End: 2018-10-23 | Stop reason: HOSPADM

## 2018-10-23 RX ORDER — VANCOMYCIN HYDROCHLORIDE 1 G/200ML
1000 INJECTION, SOLUTION INTRAVENOUS
Status: COMPLETED | OUTPATIENT
Start: 2018-10-23 | End: 2018-10-23

## 2018-10-23 RX ORDER — SODIUM CHLORIDE 0.9 % (FLUSH) 0.9 %
10 SYRINGE (ML) INJECTION EVERY 12 HOURS SCHEDULED
Status: DISCONTINUED | OUTPATIENT
Start: 2018-10-23 | End: 2018-10-23 | Stop reason: HOSPADM

## 2018-10-23 RX ORDER — 0.9 % SODIUM CHLORIDE 0.9 %
500 INTRAVENOUS SOLUTION INTRAVENOUS ONCE
Status: COMPLETED | OUTPATIENT
Start: 2018-10-24 | End: 2018-10-24

## 2018-10-23 RX ORDER — SODIUM CHLORIDE, SODIUM LACTATE, POTASSIUM CHLORIDE, CALCIUM CHLORIDE 600; 310; 30; 20 MG/100ML; MG/100ML; MG/100ML; MG/100ML
INJECTION, SOLUTION INTRAVENOUS CONTINUOUS
Status: DISCONTINUED | OUTPATIENT
Start: 2018-10-23 | End: 2018-10-23

## 2018-10-23 RX ORDER — MIDAZOLAM HYDROCHLORIDE 1 MG/ML
INJECTION INTRAMUSCULAR; INTRAVENOUS PRN
Status: DISCONTINUED | OUTPATIENT
Start: 2018-10-23 | End: 2018-10-23 | Stop reason: SDUPTHER

## 2018-10-23 RX ORDER — MAGNESIUM HYDROXIDE 1200 MG/15ML
LIQUID ORAL
Status: COMPLETED | OUTPATIENT
Start: 2018-10-23 | End: 2018-10-23

## 2018-10-23 RX ORDER — ONDANSETRON 2 MG/ML
4 INJECTION INTRAMUSCULAR; INTRAVENOUS EVERY 6 HOURS PRN
Status: DISCONTINUED | OUTPATIENT
Start: 2018-10-23 | End: 2018-10-28 | Stop reason: HOSPADM

## 2018-10-23 RX ORDER — IPRATROPIUM BROMIDE AND ALBUTEROL SULFATE 2.5; .5 MG/3ML; MG/3ML
1 SOLUTION RESPIRATORY (INHALATION) EVERY 4 HOURS PRN
Status: DISCONTINUED | OUTPATIENT
Start: 2018-10-23 | End: 2018-10-28 | Stop reason: HOSPADM

## 2018-10-23 RX ORDER — SODIUM CHLORIDE 9 MG/ML
INJECTION, SOLUTION INTRAVENOUS CONTINUOUS
Status: DISCONTINUED | OUTPATIENT
Start: 2018-10-23 | End: 2018-10-24

## 2018-10-23 RX ORDER — NICOTINE POLACRILEX 4 MG
15 LOZENGE BUCCAL PRN
Status: DISCONTINUED | OUTPATIENT
Start: 2018-10-23 | End: 2018-10-28 | Stop reason: HOSPADM

## 2018-10-23 RX ORDER — ONDANSETRON 2 MG/ML
4 INJECTION INTRAMUSCULAR; INTRAVENOUS
Status: DISCONTINUED | OUTPATIENT
Start: 2018-10-23 | End: 2018-10-23 | Stop reason: HOSPADM

## 2018-10-23 RX ORDER — DEXTROSE MONOHYDRATE 50 MG/ML
100 INJECTION, SOLUTION INTRAVENOUS PRN
Status: DISCONTINUED | OUTPATIENT
Start: 2018-10-23 | End: 2018-10-28 | Stop reason: HOSPADM

## 2018-10-23 RX ORDER — SODIUM CHLORIDE, SODIUM LACTATE, POTASSIUM CHLORIDE, CALCIUM CHLORIDE 600; 310; 30; 20 MG/100ML; MG/100ML; MG/100ML; MG/100ML
INJECTION, SOLUTION INTRAVENOUS CONTINUOUS PRN
Status: DISCONTINUED | OUTPATIENT
Start: 2018-10-23 | End: 2018-10-23 | Stop reason: SDUPTHER

## 2018-10-23 RX ORDER — HYDROCHLOROTHIAZIDE 25 MG/1
25 TABLET ORAL DAILY
Status: DISCONTINUED | OUTPATIENT
Start: 2018-10-23 | End: 2018-10-23

## 2018-10-23 RX ORDER — DEXTROSE MONOHYDRATE 25 G/50ML
12.5 INJECTION, SOLUTION INTRAVENOUS PRN
Status: DISCONTINUED | OUTPATIENT
Start: 2018-10-23 | End: 2018-10-28 | Stop reason: HOSPADM

## 2018-10-23 RX ORDER — SODIUM CHLORIDE 0.9 % (FLUSH) 0.9 %
10 SYRINGE (ML) INJECTION PRN
Status: DISCONTINUED | OUTPATIENT
Start: 2018-10-23 | End: 2018-10-23 | Stop reason: HOSPADM

## 2018-10-23 RX ORDER — DIVALPROEX SODIUM 125 MG/1
125 CAPSULE, COATED PELLETS ORAL 2 TIMES DAILY
Status: DISCONTINUED | OUTPATIENT
Start: 2018-10-23 | End: 2018-10-28 | Stop reason: HOSPADM

## 2018-10-23 RX ORDER — SODIUM CHLORIDE 9 MG/ML
INJECTION, SOLUTION INTRAVENOUS CONTINUOUS
Status: DISCONTINUED | OUTPATIENT
Start: 2018-10-23 | End: 2018-10-23

## 2018-10-23 RX ORDER — 0.9 % SODIUM CHLORIDE 0.9 %
500 INTRAVENOUS SOLUTION INTRAVENOUS ONCE
Status: COMPLETED | OUTPATIENT
Start: 2018-10-23 | End: 2018-10-23

## 2018-10-23 RX ORDER — ALENDRONATE SODIUM 70 MG/1
70 TABLET ORAL
Status: DISCONTINUED | OUTPATIENT
Start: 2018-10-23 | End: 2018-10-23 | Stop reason: RX

## 2018-10-23 RX ORDER — TRAMADOL HYDROCHLORIDE 50 MG/1
50 TABLET ORAL EVERY 6 HOURS PRN
Status: DISCONTINUED | OUTPATIENT
Start: 2018-10-23 | End: 2018-10-28 | Stop reason: HOSPADM

## 2018-10-23 RX ORDER — CELECOXIB 200 MG/1
400 CAPSULE ORAL ONCE
Status: COMPLETED | OUTPATIENT
Start: 2018-10-23 | End: 2018-10-23

## 2018-10-23 RX ORDER — PANTOPRAZOLE SODIUM 40 MG/1
40 TABLET, DELAYED RELEASE ORAL DAILY
Status: DISCONTINUED | OUTPATIENT
Start: 2018-10-23 | End: 2018-10-28 | Stop reason: HOSPADM

## 2018-10-23 RX ORDER — BUPIVACAINE HYDROCHLORIDE 7.5 MG/ML
INJECTION, SOLUTION INTRASPINAL PRN
Status: DISCONTINUED | OUTPATIENT
Start: 2018-10-23 | End: 2018-10-23 | Stop reason: SDUPTHER

## 2018-10-23 RX ORDER — CALCIUM CARBONATE 500(1250)
250 TABLET ORAL 2 TIMES DAILY
Status: DISCONTINUED | OUTPATIENT
Start: 2018-10-23 | End: 2018-10-28 | Stop reason: HOSPADM

## 2018-10-23 RX ORDER — HYDROMORPHONE HCL 110MG/55ML
0.5 PATIENT CONTROLLED ANALGESIA SYRINGE INTRAVENOUS EVERY 5 MIN PRN
Status: DISCONTINUED | OUTPATIENT
Start: 2018-10-23 | End: 2018-10-23 | Stop reason: HOSPADM

## 2018-10-23 RX ORDER — SODIUM CHLORIDE 0.9 % (FLUSH) 0.9 %
10 SYRINGE (ML) INJECTION EVERY 12 HOURS SCHEDULED
Status: DISCONTINUED | OUTPATIENT
Start: 2018-10-23 | End: 2018-10-27

## 2018-10-23 RX ORDER — ESCITALOPRAM OXALATE 10 MG/1
10 TABLET ORAL DAILY
Status: DISCONTINUED | OUTPATIENT
Start: 2018-10-23 | End: 2018-10-28 | Stop reason: HOSPADM

## 2018-10-23 RX ORDER — PREGABALIN 75 MG/1
75 CAPSULE ORAL 2 TIMES DAILY
Status: DISCONTINUED | OUTPATIENT
Start: 2018-10-23 | End: 2018-10-24

## 2018-10-23 RX ORDER — BUPIVACAINE HYDROCHLORIDE 5 MG/ML
20 INJECTION, SOLUTION EPIDURAL; INTRACAUDAL ONCE
Status: COMPLETED | OUTPATIENT
Start: 2018-10-23 | End: 2018-10-23

## 2018-10-23 RX ORDER — HYDRALAZINE HYDROCHLORIDE 20 MG/ML
5 INJECTION INTRAMUSCULAR; INTRAVENOUS EVERY 10 MIN PRN
Status: DISCONTINUED | OUTPATIENT
Start: 2018-10-23 | End: 2018-10-23 | Stop reason: HOSPADM

## 2018-10-23 RX ORDER — LIDOCAINE HYDROCHLORIDE 10 MG/ML
1 INJECTION, SOLUTION EPIDURAL; INFILTRATION; INTRACAUDAL; PERINEURAL
Status: DISCONTINUED | OUTPATIENT
Start: 2018-10-23 | End: 2018-10-23 | Stop reason: HOSPADM

## 2018-10-23 RX ORDER — FENTANYL CITRATE 50 UG/ML
50 INJECTION, SOLUTION INTRAMUSCULAR; INTRAVENOUS ONCE
Status: COMPLETED | OUTPATIENT
Start: 2018-10-23 | End: 2018-10-23

## 2018-10-23 RX ORDER — BUPIVACAINE HYDROCHLORIDE AND EPINEPHRINE 2.5; 5 MG/ML; UG/ML
INJECTION, SOLUTION EPIDURAL; INFILTRATION; INTRACAUDAL; PERINEURAL
Status: COMPLETED | OUTPATIENT
Start: 2018-10-23 | End: 2018-10-23

## 2018-10-23 RX ORDER — DIPHENHYDRAMINE HCL 25 MG
50 TABLET ORAL EVERY 8 HOURS PRN
Status: DISCONTINUED | OUTPATIENT
Start: 2018-10-23 | End: 2018-10-28 | Stop reason: HOSPADM

## 2018-10-23 RX ORDER — PROPOFOL 10 MG/ML
INJECTION, EMULSION INTRAVENOUS PRN
Status: DISCONTINUED | OUTPATIENT
Start: 2018-10-23 | End: 2018-10-23 | Stop reason: SDUPTHER

## 2018-10-23 RX ORDER — RISPERIDONE 0.5 MG/1
0.25 TABLET, FILM COATED ORAL NIGHTLY
Status: DISCONTINUED | OUTPATIENT
Start: 2018-10-23 | End: 2018-10-28 | Stop reason: HOSPADM

## 2018-10-23 RX ORDER — CEFAZOLIN SODIUM 2 G/100ML
2 INJECTION, SOLUTION INTRAVENOUS EVERY 8 HOURS
Status: COMPLETED | OUTPATIENT
Start: 2018-10-23 | End: 2018-10-24

## 2018-10-23 RX ORDER — ALLOPURINOL 300 MG/1
300 TABLET ORAL DAILY
Status: DISCONTINUED | OUTPATIENT
Start: 2018-10-23 | End: 2018-10-28 | Stop reason: HOSPADM

## 2018-10-23 RX ORDER — MAGNESIUM HYDROXIDE 1200 MG/15ML
LIQUID ORAL CONTINUOUS PRN
Status: COMPLETED | OUTPATIENT
Start: 2018-10-23 | End: 2018-10-23

## 2018-10-23 RX ORDER — PROPOFOL 10 MG/ML
INJECTION, EMULSION INTRAVENOUS CONTINUOUS PRN
Status: DISCONTINUED | OUTPATIENT
Start: 2018-10-23 | End: 2018-10-23 | Stop reason: SDUPTHER

## 2018-10-23 RX ADMIN — DIVALPROEX SODIUM 125 MG: 125 CAPSULE, COATED PELLETS ORAL at 22:32

## 2018-10-23 RX ADMIN — OXYCODONE HYDROCHLORIDE AND ACETAMINOPHEN 1 TABLET: 5; 325 TABLET ORAL at 17:54

## 2018-10-23 RX ADMIN — Medication: at 21:21

## 2018-10-23 RX ADMIN — PANTOPRAZOLE SODIUM 40 MG: 40 TABLET, DELAYED RELEASE ORAL at 17:54

## 2018-10-23 RX ADMIN — TRANEXAMIC ACID 1000 MG: 1 INJECTION, SOLUTION INTRAVENOUS at 10:41

## 2018-10-23 RX ADMIN — CALCIUM 250 MG: 500 TABLET ORAL at 21:12

## 2018-10-23 RX ADMIN — RISPERIDONE 0.25 MG: 0.5 TABLET ORAL at 21:14

## 2018-10-23 RX ADMIN — SODIUM CHLORIDE, POTASSIUM CHLORIDE, SODIUM LACTATE AND CALCIUM CHLORIDE: 600; 310; 30; 20 INJECTION, SOLUTION INTRAVENOUS at 10:42

## 2018-10-23 RX ADMIN — TRANEXAMIC ACID 1 G: 1 INJECTION, SOLUTION INTRAVENOUS at 12:18

## 2018-10-23 RX ADMIN — INSULIN LISPRO 1 UNITS: 100 INJECTION, SOLUTION INTRAVENOUS; SUBCUTANEOUS at 21:11

## 2018-10-23 RX ADMIN — PREGABALIN 75 MG: 75 CAPSULE ORAL at 21:11

## 2018-10-23 RX ADMIN — PHENYLEPHRINE HYDROCHLORIDE 200 MCG: 10 INJECTION INTRAVENOUS at 11:37

## 2018-10-23 RX ADMIN — SODIUM CHLORIDE 500 ML: 9 INJECTION, SOLUTION INTRAVENOUS at 23:59

## 2018-10-23 RX ADMIN — PROPOFOL 25 MCG/KG/MIN: 10 INJECTION, EMULSION INTRAVENOUS at 11:01

## 2018-10-23 RX ADMIN — CEFAZOLIN SODIUM 2 G: 2 INJECTION, SOLUTION INTRAVENOUS at 17:55

## 2018-10-23 RX ADMIN — SODIUM CHLORIDE 500 ML: 9 INJECTION, SOLUTION INTRAVENOUS at 21:50

## 2018-10-23 RX ADMIN — VANCOMYCIN HYDROCHLORIDE 1000 MG: 1 INJECTION, SOLUTION INTRAVENOUS at 11:15

## 2018-10-23 RX ADMIN — PHENYLEPHRINE HYDROCHLORIDE 200 MCG: 10 INJECTION INTRAVENOUS at 11:36

## 2018-10-23 RX ADMIN — PROPOFOL 10 MG: 10 INJECTION, EMULSION INTRAVENOUS at 11:00

## 2018-10-23 RX ADMIN — DOCUSATE SODIUM 100 MG: 100 CAPSULE, LIQUID FILLED ORAL at 17:54

## 2018-10-23 RX ADMIN — PHENYLEPHRINE HYDROCHLORIDE 100 MCG: 10 INJECTION INTRAVENOUS at 12:07

## 2018-10-23 RX ADMIN — ATORVASTATIN CALCIUM 10 MG: 10 TABLET, FILM COATED ORAL at 21:12

## 2018-10-23 RX ADMIN — PROPOFOL 10 MG: 10 INJECTION, EMULSION INTRAVENOUS at 11:03

## 2018-10-23 RX ADMIN — SODIUM CHLORIDE, POTASSIUM CHLORIDE, SODIUM LACTATE AND CALCIUM CHLORIDE: 600; 310; 30; 20 INJECTION, SOLUTION INTRAVENOUS at 12:38

## 2018-10-23 RX ADMIN — SODIUM CHLORIDE: 9 INJECTION, SOLUTION INTRAVENOUS at 09:39

## 2018-10-23 RX ADMIN — PHENYLEPHRINE HYDROCHLORIDE 100 MCG: 10 INJECTION INTRAVENOUS at 12:16

## 2018-10-23 RX ADMIN — PHENYLEPHRINE HYDROCHLORIDE 200 MCG: 10 INJECTION INTRAVENOUS at 12:43

## 2018-10-23 RX ADMIN — CEFAZOLIN SODIUM 2 G: 2 INJECTION, SOLUTION INTRAVENOUS at 10:41

## 2018-10-23 RX ADMIN — PHENYLEPHRINE HYDROCHLORIDE 100 MCG: 10 INJECTION INTRAVENOUS at 11:22

## 2018-10-23 RX ADMIN — PHENYLEPHRINE HYDROCHLORIDE 100 MCG: 10 INJECTION INTRAVENOUS at 11:44

## 2018-10-23 RX ADMIN — MIDAZOLAM HYDROCHLORIDE 1 MG: 1 INJECTION, SOLUTION INTRAMUSCULAR; INTRAVENOUS at 10:57

## 2018-10-23 RX ADMIN — MIDAZOLAM HYDROCHLORIDE 1 MG: 1 INJECTION, SOLUTION INTRAMUSCULAR; INTRAVENOUS at 10:51

## 2018-10-23 RX ADMIN — CELECOXIB 400 MG: 200 CAPSULE ORAL at 09:26

## 2018-10-23 RX ADMIN — PHENYLEPHRINE HYDROCHLORIDE 200 MCG: 10 INJECTION INTRAVENOUS at 12:21

## 2018-10-23 RX ADMIN — FENTANYL CITRATE 50 MCG: 50 INJECTION INTRAMUSCULAR; INTRAVENOUS at 09:45

## 2018-10-23 RX ADMIN — PHENYLEPHRINE HYDROCHLORIDE 100 MCG: 10 INJECTION INTRAVENOUS at 11:40

## 2018-10-23 RX ADMIN — ALLOPURINOL 300 MG: 300 TABLET ORAL at 17:54

## 2018-10-23 RX ADMIN — BUPIVACAINE HYDROCHLORIDE 100 MG: 5 INJECTION, SOLUTION EPIDURAL; INTRACAUDAL; PERINEURAL at 09:48

## 2018-10-23 RX ADMIN — ESCITALOPRAM OXALATE 10 MG: 10 TABLET ORAL at 17:54

## 2018-10-23 RX ADMIN — PHENYLEPHRINE HYDROCHLORIDE 200 MCG: 10 INJECTION INTRAVENOUS at 12:30

## 2018-10-23 RX ADMIN — BUPIVACAINE HYDROCHLORIDE IN DEXTROSE 1.7 ML: 7.5 INJECTION, SOLUTION SUBARACHNOID at 10:58

## 2018-10-23 RX ADMIN — PHENYLEPHRINE HYDROCHLORIDE 100 MCG: 10 INJECTION INTRAVENOUS at 11:32

## 2018-10-23 RX ADMIN — PHENYLEPHRINE HYDROCHLORIDE 200 MCG: 10 INJECTION INTRAVENOUS at 11:35

## 2018-10-23 RX ADMIN — TRAZODONE HYDROCHLORIDE 25 MG: 50 TABLET ORAL at 21:12

## 2018-10-23 ASSESSMENT — PULMONARY FUNCTION TESTS
PIF_VALUE: 0
PIF_VALUE: 1
PIF_VALUE: 0
PIF_VALUE: 1
PIF_VALUE: 0
PIF_VALUE: 1
PIF_VALUE: 0
PIF_VALUE: 1
PIF_VALUE: 0
PIF_VALUE: 1
PIF_VALUE: 0
PIF_VALUE: 1
PIF_VALUE: 0
PIF_VALUE: 1
PIF_VALUE: 1
PIF_VALUE: 0
PIF_VALUE: 0
PIF_VALUE: 1
PIF_VALUE: 0

## 2018-10-23 ASSESSMENT — PAIN DESCRIPTION - ORIENTATION
ORIENTATION: RIGHT

## 2018-10-23 ASSESSMENT — PAIN SCALES - GENERAL
PAINLEVEL_OUTOF10: 0
PAINLEVEL_OUTOF10: 8
PAINLEVEL_OUTOF10: 2
PAINLEVEL_OUTOF10: 0
PAINLEVEL_OUTOF10: 0
PAINLEVEL_OUTOF10: 6
PAINLEVEL_OUTOF10: 0
PAINLEVEL_OUTOF10: 3
PAINLEVEL_OUTOF10: 0
PAINLEVEL_OUTOF10: 0

## 2018-10-23 ASSESSMENT — PAIN DESCRIPTION - LOCATION
LOCATION: KNEE

## 2018-10-23 ASSESSMENT — COPD QUESTIONNAIRES: CAT_SEVERITY: MODERATE

## 2018-10-23 ASSESSMENT — PAIN DESCRIPTION - PROGRESSION: CLINICAL_PROGRESSION: GRADUALLY IMPROVING

## 2018-10-23 ASSESSMENT — LIFESTYLE VARIABLES: SMOKING_STATUS: 0

## 2018-10-23 NOTE — PROGRESS NOTES
Patient admitted to PACU via stretcher, arouses tos timuli, patient moves all ext. Respirations adeq on 3L o2 per NC spo2 94%. Skin warm and dry with good color. Right leg drsg dry and intact. Toes warm with cap refil < 3 sec, palp dp and pt pulses. Patient denies pain at this time,will continue to monitor.

## 2018-10-23 NOTE — ANESTHESIA PROCEDURE NOTES
Spinal Block    Patient location during procedure: OR  Start time: 10/23/2018 10:48 AM  End time: 10/23/2018 10:58 AM  Reason for block: primary anesthetic  Staffing  Anesthesiologist: Duyen Coley  Performed: anesthesiologist   Preanesthetic Checklist  Completed: patient identified, site marked, surgical consent, pre-op evaluation, timeout performed, IV checked, risks and benefits discussed, monitors and equipment checked, anesthesia consent given, oxygen available and patient being monitored  Spinal Block  Patient position: sitting  Prep: ChloraPrep  Patient monitoring: continuous pulse ox  Approach: left paramedian  Location: L3/L4  Provider prep: mask  Agent: bupivacaine  Dose: 1.6  Dose: 1.6  Needle  Needle type: Pencan   Needle gauge: 24 G  Needle length: 4 in  Kit: Pencan Spinal Needle Isabelle Alston  Lot number: [id number]  Expiration date: 10/31/2020  Assessment  Sensory level: T8  Swirl obtained: Yes  CSF: clear  Attempts: 3+  Hemodynamics: stable

## 2018-10-23 NOTE — PROGRESS NOTES
past surgical history that includes Kidney stone surgery; Hysterectomy; Tonsillectomy; back surgery; Carotid endarterectomy (5/16/11); Colonoscopy; Abdomen surgery; fracture surgery; Appendectomy (1960); Cholecystectomy; vascular surgery; eye surgery; ERCP (2-1-2013); Abdominal aortic aneurysm repair (8/5/2013); Upper gastrointestinal endoscopy (4-28-14); Wrist surgery (Right, 2/12/16); joint replacement; joint replacement (Left, 09/28/2016); Endoscopy, colon, diagnostic; and shoulder surgery (Right, 05/02/2017). Restrictions  Restrictions/Precautions  Restrictions/Precautions: Fall Risk, Weight Bearing (high fall risk)  Required Braces or Orthoses?: Yes  Lower Extremity Weight Bearing Restrictions  Right Lower Extremity Weight Bearing: Weight Bearing As Tolerated  Required Braces or Orthoses  Right Lower Extremity Brace: Knee Immobilizer  Position Activity Restriction  Other position/activity restrictions: Admitted for a R TKR. Vision/Hearing  Vision: Within Functional Limits (nystagmus noted in L eye with superior gaze to the R )  Hearing: Within functional limits     Subjective  General  Chart Reviewed: Yes  Family / Caregiver Present: No  Diagnosis: s/p R TKR   Follows Commands: Within Functional Limits  General Comment  Comments:  The pt was supine in bed upon PT arrival and agreeable to PT   Subjective  Subjective: feels numb on RLE, felt LLE was wobbly in standing   Pain Screening  Patient Currently in Pain: Denies  Vital Signs  Patient Currently in Pain: Denies       Orientation  Orientation  Overall Orientation Status: Within Functional Limits    Social/Functional History  Social/Functional History  Type of Home: Facility (independent living)  Home Layout: One level  Home Access: Elevator (room is on third floor)  Bathroom Shower/Tub: Walk-in shower  Bathroom Toilet: Handicap height  Bathroom Equipment: Grab bars in shower, Grab bars around toilet, Shower chair, Hand-held shower  Home Equipment: 4

## 2018-10-23 NOTE — PROGRESS NOTES
Dr. Jenna Webster at bedside to complete R adductor canal nerve block. Time out complete. VSS. Pt placed on 2L O2 NC and pulse ox monitor. 50mcg fentanyl administered, see eMAR. Will continue to monitor.

## 2018-10-23 NOTE — ANESTHESIA POSTPROCEDURE EVALUATION
Department of Anesthesiology  Postprocedure Note    Patient: Renea Yen  MRN: [id number]  YOB: 1940  Date of evaluation: 10/23/2018  Time:  1:13 PM     Procedure Summary     Date:  10/23/18 Room / Location:  [facility] OR 03 / [facility] OR    Anesthesia Start:  1042 Anesthesia Stop:  8745    Procedure:  RIGHT TOTAL KNEE ARTHROPLASTY - Peg Pickerel STANDARD (Right ) Diagnosis:  (M17.11 RIGHT KNEE OSTEOARTHRITIS)    Surgeon:  Edel Samayoa MD Responsible Provider:  Lia Lechuga MD    Anesthesia Type:  MAC, regional, spinal ASA Status:  3          Anesthesia Type: MAC, regional, spinal    Denis Phase I: Denis Score: 8    Denis Phase II:      Last vitals: Reviewed and per EMR flowsheets.        Anesthesia Post Evaluation    Patient location during evaluation: PACU  Patient participation: complete - patient participated  Level of consciousness: awake and alert  Airway patency: patent  Nausea & Vomiting: no vomiting and no nausea  Complications: no  Cardiovascular status: hemodynamically stable  Respiratory status: acceptable  Hydration status: stable

## 2018-10-23 NOTE — PLAN OF CARE
Problem: Musculor/Skeletal Functional Status  Goal: Highest potential functional level  Outcome: Ongoing  Highest potential functional level will be achieved. The care plan and education has been reviewed and mutually agreed upon with the patient. Problem: Mobility - Impaired:  Goal: Mobility will improve  Mobility will improve   Outcome: Ongoing  Mobility will improve. The care plan and education has been reviewed and mutually agreed upon with the patient. Problem: Pain - Acute:  Goal: Pain level will decrease  Pain level will decrease   Outcome: Ongoing  Pain level will decrease. The care plan and education has been reviewed and mutually agreed upon with the patient.

## 2018-10-23 NOTE — PROGRESS NOTES
Occupational Therapy   Occupational Therapy Initial Assessment  Date: 10/23/2018   Patient Name: Jovita Khalil  MRN: [id number]     : 1940    Date of Service: 10/23/2018    Discharge Recommendations:  Jovita Khalil scored a 14/24 on the AM-PAC ADL Inpatient form. Current research shows that an AM-PAC score of 17 or less is typically not associated with a discharge to the patient's home setting. Based on the patients AM-PAC score and their current ADL deficits, it is recommended that the patient have 3-5 sessions per week of Occupational Therapy at d/c to increase the patients independence. OT Equipment Recommendations  Equipment Needed: No  Other: continue to assess      Patient Diagnosis(es): The primary encounter diagnosis was Preop testing. A diagnosis of Primary osteoarthritis of right knee was also pertinent to this visit. has a past medical history of Abdominal aneurysm ([address] Anemia; Anesthesia complication; Aortic aneurysm ([address] Arthritis; At risk for falls; Ataxia; Avascular necrosis ([address] Back pain; Bipolar 1 disorder ([address] Blood transfusion; CAD (coronary artery disease); Chronic kidney disease; Clostridium difficile infection; Colitis due to Clostridium difficile; Confusion; COPD (chronic obstructive pulmonary disease) ([address] Dementia; Depression; Diabetes ([address] Diabetes mellitus ([address] Diarrhea; Dysuria; Fracture of right acetabulum (HCC); GERD (gastroesophageal reflux disease); GERD (gastroesophageal reflux disease); Gout; H/O migraine; Hyperlipidemia; Hypertension; Liver disease; Major depressive disorder; Neuropathy; Other disorders of kidney and ureter; Pneumonia; Prolonged emergence from general anesthesia; Shoulder (girdle) dystocia during labor and deliver, delivered; Type II or unspecified type diabetes mellitus without mention of complication, not stated as uncontrolled; UTI (urinary tract infection); Vitamin D deficiency; Weakness; and Wrist fracture.    has a past care  Self Care Current Status (): At least 40 percent but less than 60 percent impaired, limited or restricted  Self Care Goal Status ():  At least 20 percent but less than 40 percent impaired, limited or restricted  AM-PAC Score        AM-PAC Inpatient Daily Activity Raw Score: 14  AM-PAC Inpatient ADL T-Scale Score : 33.39  ADL Inpatient CMS 0-100% Score: 59.67  ADL Inpatient CMS G-Code Modifier : CK    Goals  Short term goals  Time Frame for Short term goals: STG=LTG  Short term goal 1: bed mobility CGA  Short term goal 2: LB dressing with AE as needed supervision  Short term goal 3: functional mobility with RW with supervision  Short term goal 4: functional ADL transfer with RW with supervision  Short term goal 5: grooming in stance at sink with RW with supervision  Patient Goals   Patient goals : pt did not state       Therapy Time   Individual Concurrent Group Co-treatment   Time In 1445         Time Out 1523         Minutes 38           Timed Code Treatment Minutes:   23 minutes    Total Treatment Minutes:  38 minutes       Marcello Karimi OTR/L [id number]

## 2018-10-23 NOTE — PROGRESS NOTES
Teaching / education initiated regarding perioperative experience, expectations, and pain management during stay. Patient verbalized understanding. Pt alert and oriented. Son states pt signs own consent.

## 2018-10-23 NOTE — OP NOTE
patellar tracking as trials. The tourniquet was then deflated and hemostasis was achieved. 30 mL of orthopedic analgesic mixture was carefully infiltrated into the distal femur periosteum and anterior capsular tissue. The knee was thoroughly irrigated with dilute chlorhexidine solution which was allowed to sit for 2-3 minutes to aid in bacterial care. This was then removed with suction followed by thorough pulsatile lavage. The joint was closed with running #2 Stratfix. Skin was closed with interrupted 2-0 Vicryl and running 3-0 Monocryl. Dermabond and Prineo dressing were applied and allowed to dry. The dressing was covered with Telfa and Tegaderm. The limb was wrapped with sterile cast padding at the knee and from the foot to the thigh with an Ace bandage. A cryocuff was also applied over the Ace wrap. The limb was placed in an immobilizer to be utilized with ambulation until adequate quad function returns. All needle and sponge counts matched the initial count per circulating and scrub personnel x2 prior to ending the case and the patient was awakened and taken to the recovery room in stable condition with brisk capillary refill to the operative limb and having tolerated the procedure well. Sarah Beth Cisneros PA-C assisted me during this surgery. His services were required to assist with the procedure by providing help with patient positioning and prepping, exposure, retraction and closure. Electronically signed by:  Elmer Hector, [address] [place] [address] and Sports Medicine  10/23/18  2:01 PM

## 2018-10-23 NOTE — PROGRESS NOTES
Alert and oriented. Able to perform straight leg raise. Patient has met criteria for Therapy to do evaluation.

## 2018-10-24 ENCOUNTER — APPOINTMENT (OUTPATIENT)
Dept: GENERAL RADIOLOGY | Age: 78
DRG: 470 | End: 2018-10-24
Attending: ORTHOPAEDIC SURGERY
Payer: MEDICARE

## 2018-10-24 PROBLEM — D69.6 THROMBOCYTOPENIA (HCC): Status: ACTIVE | Noted: 2018-10-24

## 2018-10-24 PROBLEM — D62 ACUTE BLOOD LOSS AS CAUSE OF POSTOPERATIVE ANEMIA: Status: ACTIVE | Noted: 2018-10-24

## 2018-10-24 PROBLEM — I95.9 HYPOTENSION: Status: ACTIVE | Noted: 2018-10-24

## 2018-10-24 LAB
ANION GAP SERPL CALCULATED.3IONS-SCNC: 10 MMOL/L (ref 3–16)
ANTIBODY IDENTIFICATION: NORMAL
ANTIBODY IDENTIFICATION: NORMAL
BUN BLDV-MCNC: 24 MG/DL (ref 7–20)
CALCIUM SERPL-MCNC: 7.5 MG/DL (ref 8.3–10.6)
CHLORIDE BLD-SCNC: 105 MMOL/L (ref 99–110)
CO2: 23 MMOL/L (ref 21–32)
CREAT SERPL-MCNC: 0.8 MG/DL (ref 0.6–1.2)
GFR AFRICAN AMERICAN: >60
GFR NON-AFRICAN AMERICAN: >60
GLUCOSE BLD-MCNC: 108 MG/DL (ref 70–99)
GLUCOSE BLD-MCNC: 111 MG/DL (ref 70–99)
GLUCOSE BLD-MCNC: 118 MG/DL (ref 70–99)
GLUCOSE BLD-MCNC: 141 MG/DL (ref 70–99)
GLUCOSE BLD-MCNC: 159 MG/DL (ref 70–99)
HCT VFR BLD CALC: 29 % (ref 36–48)
HEMOGLOBIN: 9.6 G/DL (ref 12–16)
INR BLD: 1.18 (ref 0.86–1.14)
MCH RBC QN AUTO: 36.1 PG (ref 26–34)
MCHC RBC AUTO-ENTMCNC: 32.9 G/DL (ref 31–36)
MCV RBC AUTO: 109.8 FL (ref 80–100)
PDW BLD-RTO: 15.4 % (ref 12.4–15.4)
PERFORMED ON: ABNORMAL
PLATELET # BLD: 99 K/UL (ref 135–450)
PLATELET SLIDE REVIEW: ABNORMAL
PMV BLD AUTO: 7.6 FL (ref 5–10.5)
POTASSIUM REFLEX MAGNESIUM: 4.4 MMOL/L (ref 3.5–5.1)
PROTHROMBIN TIME: 13.4 SEC (ref 9.8–13)
RBC # BLD: 2.65 M/UL (ref 4–5.2)
SLIDE REVIEW: ABNORMAL
SODIUM BLD-SCNC: 138 MMOL/L (ref 136–145)
WBC # BLD: 7.4 K/UL (ref 4–11)

## 2018-10-24 PROCEDURE — 6370000000 HC RX 637 (ALT 250 FOR IP): Performed by: PHYSICIAN ASSISTANT

## 2018-10-24 PROCEDURE — B54NZZA ULTRASONOGRAPHY OF LEFT UPPER EXTREMITY VEINS, GUIDANCE: ICD-10-PCS | Performed by: ORTHOPAEDIC SURGERY

## 2018-10-24 PROCEDURE — 97530 THERAPEUTIC ACTIVITIES: CPT

## 2018-10-24 PROCEDURE — 97110 THERAPEUTIC EXERCISES: CPT

## 2018-10-24 PROCEDURE — 99024 POSTOP FOLLOW-UP VISIT: CPT | Performed by: NURSE PRACTITIONER

## 2018-10-24 PROCEDURE — 94760 N-INVAS EAR/PLS OXIMETRY 1: CPT

## 2018-10-24 PROCEDURE — 36415 COLL VENOUS BLD VENIPUNCTURE: CPT

## 2018-10-24 PROCEDURE — 2580000003 HC RX 258: Performed by: INTERNAL MEDICINE

## 2018-10-24 PROCEDURE — 6370000000 HC RX 637 (ALT 250 FOR IP): Performed by: INTERNAL MEDICINE

## 2018-10-24 PROCEDURE — 2700000000 HC OXYGEN THERAPY PER DAY

## 2018-10-24 PROCEDURE — 85610 PROTHROMBIN TIME: CPT

## 2018-10-24 PROCEDURE — 36569 INSJ PICC 5 YR+ W/O IMAGING: CPT

## 2018-10-24 PROCEDURE — 97116 GAIT TRAINING THERAPY: CPT

## 2018-10-24 PROCEDURE — 85027 COMPLETE CBC AUTOMATED: CPT

## 2018-10-24 PROCEDURE — 71045 X-RAY EXAM CHEST 1 VIEW: CPT

## 2018-10-24 PROCEDURE — 86860 RBC ANTIBODY ELUTION: CPT

## 2018-10-24 PROCEDURE — 86880 COOMBS TEST DIRECT: CPT

## 2018-10-24 PROCEDURE — 86902 BLOOD TYPE ANTIGEN DONOR EA: CPT

## 2018-10-24 PROCEDURE — 97535 SELF CARE MNGMENT TRAINING: CPT

## 2018-10-24 PROCEDURE — 80048 BASIC METABOLIC PNL TOTAL CA: CPT

## 2018-10-24 PROCEDURE — 05HC33Z INSERTION OF INFUSION DEVICE INTO LEFT BASILIC VEIN, PERCUTANEOUS APPROACH: ICD-10-PCS | Performed by: ORTHOPAEDIC SURGERY

## 2018-10-24 PROCEDURE — 2580000003 HC RX 258: Performed by: PHYSICIAN ASSISTANT

## 2018-10-24 PROCEDURE — 86870 RBC ANTIBODY IDENTIFICATION: CPT

## 2018-10-24 PROCEDURE — 6360000002 HC RX W HCPCS: Performed by: PHYSICIAN ASSISTANT

## 2018-10-24 PROCEDURE — 51798 US URINE CAPACITY MEASURE: CPT

## 2018-10-24 PROCEDURE — 1200000000 HC SEMI PRIVATE

## 2018-10-24 PROCEDURE — 94640 AIRWAY INHALATION TREATMENT: CPT

## 2018-10-24 RX ORDER — MIDODRINE HYDROCHLORIDE 5 MG/1
5 TABLET ORAL
Status: DISCONTINUED | OUTPATIENT
Start: 2018-10-24 | End: 2018-10-25

## 2018-10-24 RX ORDER — LIDOCAINE HYDROCHLORIDE 10 MG/ML
5 INJECTION, SOLUTION EPIDURAL; INFILTRATION; INTRACAUDAL; PERINEURAL ONCE
Status: DISCONTINUED | OUTPATIENT
Start: 2018-10-24 | End: 2018-10-28 | Stop reason: HOSPADM

## 2018-10-24 RX ORDER — SODIUM CHLORIDE 0.9 % (FLUSH) 0.9 %
10 SYRINGE (ML) INJECTION EVERY 12 HOURS SCHEDULED
Status: DISCONTINUED | OUTPATIENT
Start: 2018-10-24 | End: 2018-10-28 | Stop reason: HOSPADM

## 2018-10-24 RX ORDER — ACETAMINOPHEN 500 MG
500 TABLET ORAL EVERY 6 HOURS
Status: DISCONTINUED | OUTPATIENT
Start: 2018-10-24 | End: 2018-10-28 | Stop reason: HOSPADM

## 2018-10-24 RX ORDER — SODIUM CHLORIDE 0.9 % (FLUSH) 0.9 %
10 SYRINGE (ML) INJECTION PRN
Status: DISCONTINUED | OUTPATIENT
Start: 2018-10-24 | End: 2018-10-28 | Stop reason: HOSPADM

## 2018-10-24 RX ADMIN — ASPIRIN 325 MG: 325 TABLET, DELAYED RELEASE ORAL at 20:37

## 2018-10-24 RX ADMIN — CALCIUM 250 MG: 500 TABLET ORAL at 09:07

## 2018-10-24 RX ADMIN — CEFAZOLIN SODIUM 2 G: 2 INJECTION, SOLUTION INTRAVENOUS at 02:25

## 2018-10-24 RX ADMIN — ALLOPURINOL 300 MG: 300 TABLET ORAL at 09:08

## 2018-10-24 RX ADMIN — DIVALPROEX SODIUM 125 MG: 125 CAPSULE, COATED PELLETS ORAL at 09:07

## 2018-10-24 RX ADMIN — DIVALPROEX SODIUM 125 MG: 125 CAPSULE, COATED PELLETS ORAL at 20:38

## 2018-10-24 RX ADMIN — TRAMADOL HYDROCHLORIDE 50 MG: 50 TABLET, FILM COATED ORAL at 06:17

## 2018-10-24 RX ADMIN — Medication 10 ML: at 20:45

## 2018-10-24 RX ADMIN — MIDODRINE HYDROCHLORIDE 5 MG: 5 TABLET ORAL at 16:28

## 2018-10-24 RX ADMIN — RISPERIDONE 0.25 MG: 0.5 TABLET ORAL at 20:37

## 2018-10-24 RX ADMIN — ACETAMINOPHEN 650 MG: 325 TABLET, FILM COATED ORAL at 04:50

## 2018-10-24 RX ADMIN — ACETAMINOPHEN 500 MG: 500 TABLET, FILM COATED ORAL at 18:08

## 2018-10-24 RX ADMIN — Medication 2 PUFF: at 20:54

## 2018-10-24 RX ADMIN — ASPIRIN 325 MG: 325 TABLET, DELAYED RELEASE ORAL at 09:07

## 2018-10-24 RX ADMIN — ESCITALOPRAM OXALATE 10 MG: 10 TABLET ORAL at 09:08

## 2018-10-24 RX ADMIN — PANTOPRAZOLE SODIUM 40 MG: 40 TABLET, DELAYED RELEASE ORAL at 09:07

## 2018-10-24 RX ADMIN — ACETAMINOPHEN 650 MG: 325 TABLET, FILM COATED ORAL at 00:55

## 2018-10-24 RX ADMIN — CALCIUM 250 MG: 500 TABLET ORAL at 20:37

## 2018-10-24 RX ADMIN — ACETAMINOPHEN 500 MG: 500 TABLET, FILM COATED ORAL at 13:24

## 2018-10-24 RX ADMIN — DOCUSATE SODIUM 100 MG: 100 CAPSULE, LIQUID FILLED ORAL at 09:07

## 2018-10-24 RX ADMIN — SODIUM CHLORIDE: 9 INJECTION, SOLUTION INTRAVENOUS at 02:09

## 2018-10-24 RX ADMIN — INSULIN LISPRO 1 UNITS: 100 INJECTION, SOLUTION INTRAVENOUS; SUBCUTANEOUS at 20:42

## 2018-10-24 RX ADMIN — TRAMADOL HYDROCHLORIDE 50 MG: 50 TABLET, FILM COATED ORAL at 16:28

## 2018-10-24 RX ADMIN — TRAMADOL HYDROCHLORIDE 50 MG: 50 TABLET, FILM COATED ORAL at 22:42

## 2018-10-24 RX ADMIN — OXYCODONE HYDROCHLORIDE AND ACETAMINOPHEN 1 TABLET: 5; 325 TABLET ORAL at 11:25

## 2018-10-24 RX ADMIN — PREGABALIN 75 MG: 75 CAPSULE ORAL at 09:08

## 2018-10-24 RX ADMIN — ATORVASTATIN CALCIUM 10 MG: 10 TABLET, FILM COATED ORAL at 20:37

## 2018-10-24 RX ADMIN — TRAZODONE HYDROCHLORIDE 25 MG: 50 TABLET ORAL at 20:38

## 2018-10-24 RX ADMIN — HYDROMORPHONE HYDROCHLORIDE 0.25 MG: 1 INJECTION, SOLUTION INTRAMUSCULAR; INTRAVENOUS; SUBCUTANEOUS at 07:14

## 2018-10-24 ASSESSMENT — PAIN SCALES - GENERAL
PAINLEVEL_OUTOF10: 8
PAINLEVEL_OUTOF10: 10
PAINLEVEL_OUTOF10: 1
PAINLEVEL_OUTOF10: 9
PAINLEVEL_OUTOF10: 8
PAINLEVEL_OUTOF10: 6
PAINLEVEL_OUTOF10: 10
PAINLEVEL_OUTOF10: 8
PAINLEVEL_OUTOF10: 9
PAINLEVEL_OUTOF10: 7
PAINLEVEL_OUTOF10: 8

## 2018-10-24 ASSESSMENT — PAIN DESCRIPTION - ORIENTATION
ORIENTATION: RIGHT
ORIENTATION: POSTERIOR;RIGHT
ORIENTATION: RIGHT

## 2018-10-24 ASSESSMENT — PAIN DESCRIPTION - LOCATION
LOCATION: KNEE

## 2018-10-24 ASSESSMENT — PAIN DESCRIPTION - PAIN TYPE
TYPE: SURGICAL PAIN

## 2018-10-24 ASSESSMENT — PAIN DESCRIPTION - FREQUENCY: FREQUENCY: CONTINUOUS

## 2018-10-24 NOTE — CONSULTS
kidney and ureter; Pneumonia; Prolonged emergence from general anesthesia; Shoulder (girdle) dystocia during labor and deliver, delivered; Type II or unspecified type diabetes mellitus without mention of complication, not stated as uncontrolled; UTI (urinary tract infection); Vitamin D deficiency; Weakness; and Wrist fracture. Past Surgical History:   has a past surgical history that includes Kidney stone surgery; Hysterectomy; Tonsillectomy; back surgery; Carotid endarterectomy (5/16/11); Colonoscopy; Abdomen surgery; fracture surgery; Appendectomy (1960); Cholecystectomy; vascular surgery; eye surgery; ERCP (2-1-2013); Abdominal aortic aneurysm repair (8/5/2013); Upper gastrointestinal endoscopy (4-28-14); Wrist surgery (Right, 2/12/16); joint replacement; joint replacement (Left, 09/28/2016); Endoscopy, colon, diagnostic; shoulder surgery (Right, 05/02/2017); and pr total knee arthroplasty (Right, 10/23/2018). Medications:   acetaminophen  500 mg Oral Q6H    allopurinol  300 mg Oral Daily    atorvastatin  10 mg Oral Nightly    calcium elemental  250 mg Oral BID    divalproex  125 mg Oral BID    docusate sodium  100 mg Oral Daily    escitalopram  10 mg Oral Daily    mupirocin   Nasal Daily    pantoprazole  40 mg Oral Daily    risperiDONE  0.25 mg Oral Nightly    traZODone  25 mg Oral Nightly    sodium chloride flush  10 mL Intravenous 2 times per day    insulin lispro  0-12 Units Subcutaneous TID WC    insulin lispro  0-6 Units Subcutaneous Nightly    aspirin  325 mg Oral BID    influenza quadrivalent split vaccine  0.5 mL Intramuscular Once       Allergies: Allergies   Allergen Reactions    Clindamycin/Lincomycin Diarrhea     Hx of C.diff, Hx of fecal transplant, clindamycin contraindicated always.     Penicillins Hives    Ambien [Zolpidem Tartrate] Other (See Comments)     confusion    Chantix [Varenicline]      Visual and auditory hallucinations    Pentazocine Lactate Other (See

## 2018-10-24 NOTE — PROGRESS NOTES
CHOLECYSTECTOMY      COLONOSCOPY      diverticulitis; bowel surgery    ENDOSCOPY, COLON, DIAGNOSTIC      egd-dilated esoughagous    ERCP  2-1-2013    ercp with stent placement    EYE SURGERY      cataract b/l    FRACTURE SURGERY      left elbow 1992    HYSTERECTOMY      JOINT REPLACEMENT      rt hip and lt knee    JOINT REPLACEMENT Left 09/28/2016    sholuder    KIDNEY STONE SURGERY      removed abcess lt kidney and stone    MT TOTAL KNEE ARTHROPLASTY Right 10/23/2018    RIGHT TOTAL KNEE ARTHROPLASTY - SYED STANDARD performed by Carroll eRyes MD at [address] Right 05/02/2017    R reverse TSA with bone grafting of complex glenoid deficiency with biceps tenodesis    TONSILLECTOMY      as child    UPPER GASTROINTESTINAL ENDOSCOPY  4-28-14    VASCULAR SURGERY      WRIST SURGERY Right 2/12/16    ORIF right distal radius       Level of Consciousness: Alert, Oriented, and Cooperative = 0    Level of Activity: Walking with assistance = 1    Respiratory Pattern: Regular Pattern; RR 8-20 = 0    Breath Sounds: Clear = 0    Sputum   ,  ,    Cough: Strong, spontaneous, non-productive = 0    Vital Signs   BP (!) 91/57   Pulse 75   Temp 98 °F (36.7 °C)   Resp 16   Ht 5' 3\" (1.6 m)   Wt 155 lb 14.4 oz (70.7 kg)   SpO2 99%   BMI 27.62 kg/m²   SPO2 (COPD values may differ): 88-89% on room air or greater than 92% on FiO2 28- 35% = 2    Peak Flow (asthma only): not applicable = 0    RSI: 5-6 = Q4hr PRN (every four hours as needed) for dyspnea        Plan       Goals: mobilize retained secretions and volume expansion  Plan of Care: prn per home regimen    Patient/caregiver was educated on the proper method of use of Respiratory Therapy device:  Yes      Level of understanding, patient and caregiver was able to:(check appropriate box(es))   [x] Verbalize understanding   [x] Demonstrate understanding       [] Teach back        [] Needs reinforcement       []  No available caregiver

## 2018-10-24 NOTE — DISCHARGE INSTR - COC
Mood disorder ([facility] [[email or web address].) F39    10/23/18 RIGHT TKA M17.11       Isolation/Infection:   Isolation          No Isolation        Patient Infection Status     Infection Encounter Level? Added Added By Resolved Resolved By Review Date Onset Date    C-diff (Clostridium difficile) (RETIRED) No 08/02/11 Koko Pham 11/10/11 Koko Pham      (+) 8/1/11    C-diff (Clostridium difficile) (RETIRED) No 04/27/11 Gail Varghese RN 08/01/11 Koko Pham            Nurse Assessment:  Last Vital Signs: BP 85/60   Pulse 72   Temp 98.6 °F (37 °C) (Oral)   Resp 17   Ht 5' 3\" (1.6 m)   Wt 155 lb 14.4 oz (70.7 kg)   SpO2 95%   BMI 27.62 kg/m²     Last documented pain score (0-10 scale): Pain Level: 10  Last Weight:   Wt Readings from Last 1 Encounters:   10/23/18 155 lb 14.4 oz (70.7 kg)     Mental Status:  oriented, alert and able to concentrate and follow conversation   Confusion at time. IV Access:  - None    Nursing Mobility/ADLs:  Walking   Assisted  Transfer  Assisted  Bathing  Assisted  Dressing  Assisted  Toileting  Assisted  Feeding  Independent  Med Admin  Independent  Med Delivery   whole    Wound Care Documentation and Therapy:  Incision 10/23/18 Knee Right (Active)   Wound Assessment YUDI 10/24/2018  8:17 AM   Sheryl-wound Assessment YUDI 10/24/2018  8:17 AM   Closure YUDI 10/24/2018  8:17 AM   Drainage Amount None 10/24/2018  8:17 AM   Odor None 10/24/2018  8:17 AM   Dressing/Treatment ABD; Ace Wrap;Dry dressing 10/24/2018  8:17 AM   Dressing Status Clean;Dry; Intact 10/24/2018  1:11 AM   Number of days: 0        Elimination:  Continence:   · Bowel: Yes  · Bladder: Yes  Urinary Catheter: None   Colostomy/Ileostomy/Ileal Conduit: No       Date of Last BM: ***    Intake/Output Summary (Last 24 hours) at 10/24/18 0917  Last data filed at 10/24/18 0511   Gross per 24 hour   Intake             3940 ml   Output              745 ml   Net             3195 ml     I/O last 3 completed shifts:   In: 4402

## 2018-10-24 NOTE — PROGRESS NOTES
Pt unable to void since surgery, bladder scanned for 220 mL. Fluid bolus still infusing, will continue to monitor urine output and re-evaluate once bolus has completed.

## 2018-10-24 NOTE — PROGRESS NOTES
Goals  Short term goals  Time Frame for Short term goals: To be met prior to DC  Short term goal 1: Pt will perform bed mobility with supervision  Short term goal 2: Pt will perform sit to/from stand with SBA  Short term goal 3: Pt will ambulate 48' with RW and SBA  Short term goal 4: Pt will maintain 90 degrees of knee flexion on the R  Short term goal 5: Pt will ascend/descend a curb step with RW and CGA   Patient Goals   Patient goals : \"do what therapy tells me\"    Plan    Plan  Times per week: 7  Times per day: Twice a day  Current Treatment Recommendations: Strengthening, ROM, Balance Training, Functional Mobility Training, Transfer Training, Gait Training, Stair training, Neuromuscular Re-education, Endurance Training, Safety Education & Training, Home Exercise Program  Safety Devices  Type of devices: All fall risk precautions in place, Bed alarm in place, Call light within reach, Left in bed, Patient at risk for falls  Restraints  Initially in place: No     Therapy Time   Individual Concurrent Group Co-treatment   Time In       0942   Time Out       1040   Minutes       58   Timed Code Treatment Minutes: 62 Minutes     If Pt. Is d/c'd prior to next session, this will serve as  A d/c summary.   [facility], PT, [id number]    Second Session Therapy Time:   Individual Concurrent Group Co-treatment   Time In 2372        Time Out 1602        Minutes 45          Timed Code Treatment Minutes:  05+21    Total Treatment Minutes:  R[id number], [place], DPT, [id number]

## 2018-10-24 NOTE — PROGRESS NOTES
Spoke to [employer] regarding continued hypotension and low urine output, new orders placed for STAT labs and another 500 mL bolus.

## 2018-10-24 NOTE — PROGRESS NOTES
Spoke to Javi Pozo regarding hospitalist consult and pt's low blood pressure. New orders placed for 500 mL bolus and to discontinue scheduled hydrochlorothiazide.

## 2018-10-24 NOTE — PROGRESS NOTES
Physical Therapy  Patricia Marvin   Attempted therapy on hold at this time. RN asked for therapy to return in 20 minutes, after IV was inserted. Therapy will return at earliest convenience.   [place] [id number] [place] [address]  Nathaly Navas, [address] [place] [address]

## 2018-10-25 LAB
ANION GAP SERPL CALCULATED.3IONS-SCNC: 12 MMOL/L (ref 3–16)
BASOPHILS ABSOLUTE: 0 K/UL (ref 0–0.2)
BASOPHILS RELATIVE PERCENT: 0.4 %
BUN BLDV-MCNC: 16 MG/DL (ref 7–20)
CALCIUM SERPL-MCNC: 8 MG/DL (ref 8.3–10.6)
CHLORIDE BLD-SCNC: 105 MMOL/L (ref 99–110)
CO2: 23 MMOL/L (ref 21–32)
CREAT SERPL-MCNC: 0.8 MG/DL (ref 0.6–1.2)
EOSINOPHILS ABSOLUTE: 0.1 K/UL (ref 0–0.6)
EOSINOPHILS RELATIVE PERCENT: 0.9 %
GFR AFRICAN AMERICAN: >60
GFR NON-AFRICAN AMERICAN: >60
GLUCOSE BLD-MCNC: 116 MG/DL (ref 70–99)
GLUCOSE BLD-MCNC: 120 MG/DL (ref 70–99)
GLUCOSE BLD-MCNC: 120 MG/DL (ref 70–99)
GLUCOSE BLD-MCNC: 142 MG/DL (ref 70–99)
GLUCOSE BLD-MCNC: 167 MG/DL (ref 70–99)
HCT VFR BLD CALC: 26.2 % (ref 36–48)
HEMATOLOGY PATH CONSULT: NORMAL
HEMATOLOGY PATH CONSULT: YES
HEMOGLOBIN: 8.5 G/DL (ref 12–16)
LACTIC ACID: 2.7 MMOL/L (ref 0.4–2)
LYMPHOCYTES ABSOLUTE: 1.3 K/UL (ref 1–5.1)
LYMPHOCYTES RELATIVE PERCENT: 16.6 %
MACROCYTES: ABNORMAL
MCH RBC QN AUTO: 35.8 PG (ref 26–34)
MCHC RBC AUTO-ENTMCNC: 32.3 G/DL (ref 31–36)
MCV RBC AUTO: 110.8 FL (ref 80–100)
MONOCYTES ABSOLUTE: 1.1 K/UL (ref 0–1.3)
MONOCYTES RELATIVE PERCENT: 14.6 %
NEUTROPHILS ABSOLUTE: 5.2 K/UL (ref 1.7–7.7)
NEUTROPHILS RELATIVE PERCENT: 67.5 %
PDW BLD-RTO: 15.8 % (ref 12.4–15.4)
PERFORMED ON: ABNORMAL
PLATELET # BLD: 102 K/UL (ref 135–450)
PLATELET SLIDE REVIEW: ABNORMAL
PMV BLD AUTO: 8 FL (ref 5–10.5)
POTASSIUM SERPL-SCNC: 4 MMOL/L (ref 3.5–5.1)
RBC # BLD: 2.36 M/UL (ref 4–5.2)
SLIDE REVIEW: ABNORMAL
SODIUM BLD-SCNC: 140 MMOL/L (ref 136–145)
WBC # BLD: 7.8 K/UL (ref 4–11)

## 2018-10-25 PROCEDURE — 97530 THERAPEUTIC ACTIVITIES: CPT

## 2018-10-25 PROCEDURE — 82728 ASSAY OF FERRITIN: CPT

## 2018-10-25 PROCEDURE — 97116 GAIT TRAINING THERAPY: CPT

## 2018-10-25 PROCEDURE — 6360000002 HC RX W HCPCS: Performed by: ORTHOPAEDIC SURGERY

## 2018-10-25 PROCEDURE — 6370000000 HC RX 637 (ALT 250 FOR IP): Performed by: PHYSICIAN ASSISTANT

## 2018-10-25 PROCEDURE — 94640 AIRWAY INHALATION TREATMENT: CPT

## 2018-10-25 PROCEDURE — 2580000003 HC RX 258: Performed by: INTERNAL MEDICINE

## 2018-10-25 PROCEDURE — 99024 POSTOP FOLLOW-UP VISIT: CPT | Performed by: NURSE PRACTITIONER

## 2018-10-25 PROCEDURE — 97535 SELF CARE MNGMENT TRAINING: CPT

## 2018-10-25 PROCEDURE — 80048 BASIC METABOLIC PNL TOTAL CA: CPT

## 2018-10-25 PROCEDURE — 82533 TOTAL CORTISOL: CPT

## 2018-10-25 PROCEDURE — 84443 ASSAY THYROID STIM HORMONE: CPT

## 2018-10-25 PROCEDURE — 1200000000 HC SEMI PRIVATE

## 2018-10-25 PROCEDURE — 84466 ASSAY OF TRANSFERRIN: CPT

## 2018-10-25 PROCEDURE — 83540 ASSAY OF IRON: CPT

## 2018-10-25 PROCEDURE — 94760 N-INVAS EAR/PLS OXIMETRY 1: CPT

## 2018-10-25 PROCEDURE — 85025 COMPLETE CBC W/AUTO DIFF WBC: CPT

## 2018-10-25 PROCEDURE — 83605 ASSAY OF LACTIC ACID: CPT

## 2018-10-25 PROCEDURE — 6370000000 HC RX 637 (ALT 250 FOR IP): Performed by: INTERNAL MEDICINE

## 2018-10-25 PROCEDURE — 90686 IIV4 VACC NO PRSV 0.5 ML IM: CPT | Performed by: ORTHOPAEDIC SURGERY

## 2018-10-25 PROCEDURE — 2700000000 HC OXYGEN THERAPY PER DAY

## 2018-10-25 PROCEDURE — G0008 ADMIN INFLUENZA VIRUS VAC: HCPCS | Performed by: ORTHOPAEDIC SURGERY

## 2018-10-25 RX ORDER — MIDODRINE HYDROCHLORIDE 5 MG/1
10 TABLET ORAL
Status: DISCONTINUED | OUTPATIENT
Start: 2018-10-26 | End: 2018-10-28 | Stop reason: HOSPADM

## 2018-10-25 RX ADMIN — OXYCODONE HYDROCHLORIDE AND ACETAMINOPHEN 2 TABLET: 5; 325 TABLET ORAL at 22:55

## 2018-10-25 RX ADMIN — DOCUSATE SODIUM 100 MG: 100 CAPSULE, LIQUID FILLED ORAL at 09:09

## 2018-10-25 RX ADMIN — CALCIUM 250 MG: 500 TABLET ORAL at 20:21

## 2018-10-25 RX ADMIN — MIDODRINE HYDROCHLORIDE 5 MG: 5 TABLET ORAL at 13:56

## 2018-10-25 RX ADMIN — MIDODRINE HYDROCHLORIDE 5 MG: 5 TABLET ORAL at 09:09

## 2018-10-25 RX ADMIN — ALLOPURINOL 300 MG: 300 TABLET ORAL at 09:08

## 2018-10-25 RX ADMIN — Medication 2 PUFF: at 08:59

## 2018-10-25 RX ADMIN — INSULIN LISPRO 1 UNITS: 100 INJECTION, SOLUTION INTRAVENOUS; SUBCUTANEOUS at 21:56

## 2018-10-25 RX ADMIN — ASPIRIN 325 MG: 325 TABLET, DELAYED RELEASE ORAL at 09:08

## 2018-10-25 RX ADMIN — Medication 10 ML: at 22:59

## 2018-10-25 RX ADMIN — ACETAMINOPHEN 500 MG: 500 TABLET, FILM COATED ORAL at 06:45

## 2018-10-25 RX ADMIN — DIVALPROEX SODIUM 125 MG: 125 CAPSULE, COATED PELLETS ORAL at 22:57

## 2018-10-25 RX ADMIN — RISPERIDONE 0.25 MG: 0.5 TABLET ORAL at 22:58

## 2018-10-25 RX ADMIN — DIVALPROEX SODIUM 125 MG: 125 CAPSULE, COATED PELLETS ORAL at 09:09

## 2018-10-25 RX ADMIN — ACETAMINOPHEN 500 MG: 500 TABLET, FILM COATED ORAL at 00:09

## 2018-10-25 RX ADMIN — CALCIUM 250 MG: 500 TABLET ORAL at 09:09

## 2018-10-25 RX ADMIN — TRAMADOL HYDROCHLORIDE 50 MG: 50 TABLET, FILM COATED ORAL at 04:30

## 2018-10-25 RX ADMIN — Medication 10 ML: at 21:52

## 2018-10-25 RX ADMIN — OXYCODONE HYDROCHLORIDE AND ACETAMINOPHEN 2 TABLET: 5; 325 TABLET ORAL at 09:08

## 2018-10-25 RX ADMIN — ESCITALOPRAM OXALATE 10 MG: 10 TABLET ORAL at 09:09

## 2018-10-25 RX ADMIN — ACETAMINOPHEN 500 MG: 500 TABLET, FILM COATED ORAL at 13:56

## 2018-10-25 RX ADMIN — ATORVASTATIN CALCIUM 10 MG: 10 TABLET, FILM COATED ORAL at 20:20

## 2018-10-25 RX ADMIN — ASPIRIN 325 MG: 325 TABLET, DELAYED RELEASE ORAL at 20:21

## 2018-10-25 RX ADMIN — OXYCODONE HYDROCHLORIDE AND ACETAMINOPHEN 2 TABLET: 5; 325 TABLET ORAL at 13:55

## 2018-10-25 RX ADMIN — MIDODRINE HYDROCHLORIDE 5 MG: 5 TABLET ORAL at 17:04

## 2018-10-25 RX ADMIN — INFLUENZA A VIRUS A/MICHIGAN/45/2015 X-275 (H1N1) ANTIGEN (FORMALDEHYDE INACTIVATED), INFLUENZA A VIRUS A/SINGAPORE/INFIMH-16-0019/2016 IVR-186 (H3N2) ANTIGEN (FORMALDEHYDE INACTIVATED), INFLUENZA B VIRUS B/PHUKET/3073/2013 ANTIGEN (FORMALDEHYDE INACTIVATED), AND INFLUENZA B VIRUS B/MARYLAND/15/2016 BX-69A ANTIGEN (FORMALDEHYDE INACTIVATED) 0.5 ML: 15; 15; 15; 15 INJECTION, SUSPENSION INTRAMUSCULAR at 09:18

## 2018-10-25 RX ADMIN — ACETAMINOPHEN 500 MG: 500 TABLET, FILM COATED ORAL at 17:04

## 2018-10-25 RX ADMIN — PANTOPRAZOLE SODIUM 40 MG: 40 TABLET, DELAYED RELEASE ORAL at 09:08

## 2018-10-25 RX ADMIN — TRAZODONE HYDROCHLORIDE 25 MG: 50 TABLET ORAL at 22:57

## 2018-10-25 RX ADMIN — Medication 2 PUFF: at 20:41

## 2018-10-25 RX ADMIN — OXYCODONE HYDROCHLORIDE AND ACETAMINOPHEN 1 TABLET: 5; 325 TABLET ORAL at 18:57

## 2018-10-25 ASSESSMENT — PAIN DESCRIPTION - DESCRIPTORS
DESCRIPTORS: ACHING;DISCOMFORT
DESCRIPTORS: ACHING

## 2018-10-25 ASSESSMENT — PAIN DESCRIPTION - LOCATION
LOCATION: KNEE

## 2018-10-25 ASSESSMENT — PAIN DESCRIPTION - FREQUENCY
FREQUENCY: CONTINUOUS
FREQUENCY: INTERMITTENT
FREQUENCY: CONTINUOUS

## 2018-10-25 ASSESSMENT — PAIN DESCRIPTION - ONSET
ONSET: ON-GOING
ONSET: ON-GOING

## 2018-10-25 ASSESSMENT — ENCOUNTER SYMPTOMS
ABDOMINAL PAIN: 0
NAUSEA: 0
BACK PAIN: 1
SHORTNESS OF BREATH: 0
SINUS PAIN: 0
EYE PAIN: 0
EYE REDNESS: 0
SORE THROAT: 0
VOMITING: 0
COUGH: 0
DIARRHEA: 0
CHEST TIGHTNESS: 0
EYE DISCHARGE: 0
CONSTIPATION: 0
WHEEZING: 0
ABDOMINAL DISTENTION: 0
BLOOD IN STOOL: 0

## 2018-10-25 ASSESSMENT — PAIN SCALES - GENERAL
PAINLEVEL_OUTOF10: 8
PAINLEVEL_OUTOF10: 5
PAINLEVEL_OUTOF10: 7
PAINLEVEL_OUTOF10: 8
PAINLEVEL_OUTOF10: 0
PAINLEVEL_OUTOF10: 3
PAINLEVEL_OUTOF10: 7
PAINLEVEL_OUTOF10: 3
PAINLEVEL_OUTOF10: 5
PAINLEVEL_OUTOF10: 4
PAINLEVEL_OUTOF10: 9
PAINLEVEL_OUTOF10: 6
PAINLEVEL_OUTOF10: 5

## 2018-10-25 ASSESSMENT — PAIN DESCRIPTION - PAIN TYPE
TYPE: ACUTE PAIN;CHRONIC PAIN
TYPE: SURGICAL PAIN
TYPE: ACUTE PAIN
TYPE: SURGICAL PAIN

## 2018-10-25 ASSESSMENT — PAIN DESCRIPTION - PROGRESSION
CLINICAL_PROGRESSION: NOT CHANGED

## 2018-10-25 ASSESSMENT — PAIN DESCRIPTION - ORIENTATION
ORIENTATION: RIGHT

## 2018-10-25 NOTE — PROGRESS NOTES
PM assessment complete. Neuro checks WDL. VSS. The right knee looks swollen and is warm to the touch. Patient resting in bed with call light in reach.

## 2018-10-25 NOTE — CONSULTS
tightness, shortness of breath and wheezing. Cardiovascular: Negative for chest pain, palpitations and leg swelling. Gastrointestinal: Negative for abdominal distention, abdominal pain, blood in stool, constipation, diarrhea, nausea and vomiting. Endocrine: Negative for cold intolerance and heat intolerance. Genitourinary: Negative for difficulty urinating, dysuria, hematuria and urgency. Musculoskeletal: Positive for arthralgias and back pain. Neurological: Negative for dizziness, tremors, seizures and speech difficulty. Psychiatric/Behavioral: Negative for confusion and hallucinations. PHYSICAL EXAM:      Vitals:    BP (!) 82/50   Pulse 109   Temp 97.3 °F (36.3 °C) (Oral)   Resp 16   Ht 5' 3\" (1.6 m)   Wt 155 lb 14.4 oz (70.7 kg)   SpO2 96%   BMI 27.62 kg/m²   I/O last 3 completed shifts:  In: -   Out: 175 [Urine:175]  No intake/output data recorded. Physical Exam:  General : AAOx3, not in pain or respiratory distress, resting in bed  HEENT : mucosa moist.  CVS: S1 S2 normal, regularrhythm, no murmurs or rubs. Lungs: Clear, no wheezing or crackles. Abd: Soft, bowel sounds normal, non-tender. Ext: no edema of LLE, right knee with surgical dressing, left knee with surgical scar  Skin: Warm. No rashes appreciated. : bladder non-distended, no tenderness over the bladder  Neuro: Alert and oriented x 3, nonfocal.  Joints: No erythema noted overjoints.     DATA:    CBC with Differential:    Lab Results   Component Value Date    WBC 7.8 10/25/2018    RBC 2.36 10/25/2018    HGB 8.5 10/25/2018    HCT 26.2 10/25/2018     10/25/2018    .8 10/25/2018    MCH 35.8 10/25/2018    MCHC 32.3 10/25/2018    RDW 15.8 10/25/2018    SEGSPCT 46.0 01/21/2013    BANDSPCT 24 04/27/2014    METASPCT 3 04/27/2014    LYMPHOPCT 16.6 10/25/2018    MONOPCT 14.6 10/25/2018    MYELOPCT 1.0 10/15/2011    EOSPCT 2.0 10/18/2011    BASOPCT 0.4 10/25/2018    MONOSABS 1.1 10/25/2018    LYMPHSABS 1.3 MD.  Office Phone : [phone]  10/25/2018 EOAE (evoked otoacoustic emission)

## 2018-10-25 NOTE — PROGRESS NOTES
Hysterectomy; Tonsillectomy; back surgery; Carotid endarterectomy (5/16/11); Colonoscopy; Abdomen surgery; fracture surgery; Appendectomy (1960); Cholecystectomy; vascular surgery; eye surgery; ERCP (2-1-2013); Abdominal aortic aneurysm repair (8/5/2013); Upper gastrointestinal endoscopy (4-28-14); Wrist surgery (Right, 2/12/16); joint replacement; joint replacement (Left, 09/28/2016); Endoscopy, colon, diagnostic; shoulder surgery (Right, 05/02/2017); and pr total knee arthroplasty (Right, 10/23/2018). Restrictions  Restrictions/Precautions  Restrictions/Precautions: Fall Risk, Weight Bearing (high fall risk)  Required Braces or Orthoses?: No  Lower Extremity Weight Bearing Restrictions  Right Lower Extremity Weight Bearing: Weight Bearing As Tolerated  Required Braces or Orthoses  Right Lower Extremity Brace:  (knee immobilizer d/c'd by PT on 10/24)  Position Activity Restriction  Other position/activity restrictions: Admitted for a R TKR. Subjective   General  Chart Reviewed: Yes  Additional Pertinent Hx: confused after anesthesia, CAD, back pain, COPD, dementia, DM, neuropathy  Response to previous treatment: Patient with no complaints from previous session  Family / Caregiver Present: No  Diagnosis: R TKA  Subjective  Subjective: Pt seated up in recliner upon arrival, agreeable to OT tx. Pt reports feeling much better today after taking Percocet. Pre Treatment Pain Screening  Intervention List: Patient able to continue with treatment  Pain Assessment  Patient Currently in Pain: Yes  Pain Assessment: 0-10  Pain Level: 5  Pain Type: Surgical pain  Pain Location: Knee  Pain Orientation: Right  Pain Intervention(s): Repositioned;RN notified; Ambulation/Increased activity  Vital Signs  Patient Currently in Pain: Yes   Orientation  Orientation  Overall Orientation Status: Within Functional Limits  Objective    ADL  Grooming: Contact guard assistance (in stance at sink to wash hands; pt transferred to [facility] to

## 2018-10-25 NOTE — PROGRESS NOTES
Physical Therapy  Facility/Department: [address] ORTHO/NEURO NURSING  Daily Treatment Note  NAME: Hermon Najjar  : 1940  MRN: [id number]    Date of Service: 10/25/2018    Discharge Recommendations:    Hermon Najjar scored a 17/24 on the AM-PAC short mobility form. Current research shows that an AM-PAC score of 17 or less is typically not associated with a discharge to the patient's home setting. Based on the patients AM-PAC score and their current functional mobility deficits, it is recommended that the patient have 3-5 sessions per week of Physical Therapy at d/c to increase the patients independence. PT Equipment Recommendations  Equipment Needed: Yes  Mobility Devices: Verlinda Massa: Rolling  Other: May defer to next level of care    Patient Diagnosis(es): The primary encounter diagnosis was Preop testing. A diagnosis of Primary osteoarthritis of right knee was also pertinent to this visit. has a past medical history of Abdominal aneurysm ([address] Anemia; Anesthesia complication; Aortic aneurysm ([address] Arthritis; At risk for falls; Ataxia; Avascular necrosis ([address] Back pain; Bipolar 1 disorder ([address] Blood transfusion; CAD (coronary artery disease); Chronic kidney disease; Clostridium difficile infection; Colitis due to Clostridium difficile; Confusion; COPD (chronic obstructive pulmonary disease) ([address] Dementia; Depression; Diabetes ([address] Diabetes mellitus ([address] Diarrhea; Dysuria; Fracture of right acetabulum (HCC); GERD (gastroesophageal reflux disease); Gout; H/O migraine; Hyperlipidemia; Hypertension; Liver disease; Major depressive disorder; Neuropathy; Other disorders of kidney and ureter; Pneumonia; Prolonged emergence from general anesthesia; Shoulder (girdle) dystocia during labor and deliver, delivered; Type II or unspecified type diabetes mellitus without mention of complication, not stated as uncontrolled; UTI (urinary tract infection);  Vitamin D deficiency; Weakness; and chair x1)  Stand to sit: Stand by assistance;Contact guard assistance (recliner x1 (SBA), B arm chair (CGA) x1)  Comment: requires moderate vc's for B hand placement and position prior to sitting; tends to want to turn and flop due to fatigue after ambulation  Ambulation  Ambulation?: Yes  More Ambulation?: Yes  Ambulation 1  Surface: level tile  Device: Rolling Walker  Assistance: Contact guard assistance  Quality of Gait: Forward flexed posture, SB to the Right, IR RLE and genuvalgus noted  Distance: 40'  Comments: Tactile facilitation to upright posture and to maneuver the walker. Ambulation 2  Surface - 2: level tile  Device 2: Rolling Walker  Assistance 2: Contact guard assistance  Quality of Gait 2: Forward flexed posture, SB to the Right, IR RLE and genuvalgus noted  Distance: 40'  Comments: c/o fatigue, took standing rest break and performed purslipped breathing  Stairs/Curb  Stairs?: Yes  Stairs  # Steps : 2  Rails: None  Curbs: 4\"  Device: Rolling walker  Assistance: Minimal assistance; Moderate assistance  Comment: pt required max vc's and demonstration for sequencing at start and throughout activity. step to pattern. Pt c/o being difficult to step and remember sequence     Balance  Posture: Fair  Sitting - Static: Good  Sitting - Dynamic: Good;-  Standing - Static: Fair (with RW)  Standing - Dynamic: Fair (with RW)  Comments: Stands with a forward flexed posture and mild unsteadiness; slightly impulsive when fatigued   Exercises  Straight Leg Raise: x10 RLE  Quad Sets: x 10 BLE  Heelslides: x10 RLE  Gluteal Sets: x10 BLE  Knee Long Arc Quad: x10 RLE  Knee Active Range of Motion: R knee: 93 flexion         Comment: applied ice pack            2nd session:  Pt supine in bed with eyes closed. Just returned from restroom. Denies pain since receiving pain meds recently. Agreeable to PT. AROM R knee: 0-10-91. Supine to sit Supervision with HOB elevated. Sit<>stand SBA.  Ambulates with RW '; requires ascend/descend a curb step with RW and CGA   Short term goal 6: Pt will ambulate 300' with AAD and supervision -new goal  Short term goal 7: Pt will perform sit to/from stand with supervision -new goal  Patient Goals   Patient goals : \"do what therapy tells me\"     Plan    Plan  Times per week: 7  Times per day: Twice a day  Current Treatment Recommendations: Strengthening, ROM, Balance Training, Functional Mobility Training, Transfer Training, Gait Training, Stair training, Neuromuscular Re-education, Endurance Training, Safety Education & Training, Home Exercise Program  Safety Devices  Type of devices: All fall risk precautions in place, Call light within reach, Patient at risk for falls, Left in chair, Chair alarm in place  Restraints  Initially in place: No     Therapy Time   Individual Concurrent Group Co-treatment   Time In 0819         Time Out 0900         Minutes 41         Timed Code Treatment Minutes: [address]  I agree with the above note. Goals addressed by PT. Lyle Hauser, PT, [place] [id number]      Second Session Therapy Time:   Individual Concurrent Group Co-treatment   Time In 2779         Time Out 1330         Minutes 28           Timed Code Treatment Minutes:  41 + 28    Total Treatment Minutes:  [address] [address]  I agree with the above note. Goals addressed by SHANTE.   Lyle Hauser, [address] [place], [place] [id number]

## 2018-10-26 LAB
ANION GAP SERPL CALCULATED.3IONS-SCNC: 15 MMOL/L (ref 3–16)
ANION GAP SERPL CALCULATED.3IONS-SCNC: 16 MMOL/L (ref 3–16)
BASOPHILS ABSOLUTE: 0 K/UL (ref 0–0.2)
BASOPHILS RELATIVE PERCENT: 0.2 %
BLOOD BANK DISPENSE STATUS: NORMAL
BLOOD BANK DISPENSE STATUS: NORMAL
BLOOD BANK PRODUCT CODE: NORMAL
BLOOD BANK PRODUCT CODE: NORMAL
BPU ID: NORMAL
BPU ID: NORMAL
BUN BLDV-MCNC: 18 MG/DL (ref 7–20)
BUN BLDV-MCNC: 18 MG/DL (ref 7–20)
CALCIUM SERPL-MCNC: 8.1 MG/DL (ref 8.3–10.6)
CALCIUM SERPL-MCNC: 8.3 MG/DL (ref 8.3–10.6)
CHLORIDE BLD-SCNC: 101 MMOL/L (ref 99–110)
CHLORIDE BLD-SCNC: 102 MMOL/L (ref 99–110)
CO2: 22 MMOL/L (ref 21–32)
CO2: 23 MMOL/L (ref 21–32)
CORTISOL - AM: 6 UG/DL (ref 4.3–22.4)
CREAT SERPL-MCNC: 0.9 MG/DL (ref 0.6–1.2)
CREAT SERPL-MCNC: 0.9 MG/DL (ref 0.6–1.2)
DESCRIPTION BLOOD BANK: NORMAL
DESCRIPTION BLOOD BANK: NORMAL
EOSINOPHILS ABSOLUTE: 0.1 K/UL (ref 0–0.6)
EOSINOPHILS RELATIVE PERCENT: 1.1 %
ESTIMATED AVERAGE GLUCOSE: 108.3 MG/DL
FERRITIN: 87.3 NG/ML (ref 15–150)
GFR AFRICAN AMERICAN: >60
GFR AFRICAN AMERICAN: >60
GFR NON-AFRICAN AMERICAN: >60
GFR NON-AFRICAN AMERICAN: >60
GLUCOSE BLD-MCNC: 111 MG/DL (ref 70–99)
GLUCOSE BLD-MCNC: 128 MG/DL (ref 70–99)
GLUCOSE BLD-MCNC: 86 MG/DL (ref 70–99)
GLUCOSE BLD-MCNC: 92 MG/DL (ref 70–99)
GLUCOSE BLD-MCNC: 97 MG/DL (ref 70–99)
HBA1C MFR BLD: 5.4 %
HCT VFR BLD CALC: 23.3 % (ref 36–48)
HCT VFR BLD CALC: 29.9 % (ref 36–48)
HEMATOLOGY PATH CONSULT: NO
HEMOGLOBIN: 10 G/DL (ref 12–16)
HEMOGLOBIN: 7.6 G/DL (ref 12–16)
IRON SATURATION: 6 % (ref 15–50)
IRON: 13 UG/DL (ref 37–145)
LACTIC ACID: 2.6 MMOL/L (ref 0.4–2)
LYMPHOCYTES ABSOLUTE: 1 K/UL (ref 1–5.1)
LYMPHOCYTES RELATIVE PERCENT: 13.8 %
MCH RBC QN AUTO: 36.2 PG (ref 26–34)
MCHC RBC AUTO-ENTMCNC: 32.5 G/DL (ref 31–36)
MCV RBC AUTO: 111.3 FL (ref 80–100)
MONOCYTES ABSOLUTE: 1.1 K/UL (ref 0–1.3)
MONOCYTES RELATIVE PERCENT: 14.5 %
NEUTROPHILS ABSOLUTE: 5.2 K/UL (ref 1.7–7.7)
NEUTROPHILS RELATIVE PERCENT: 70.4 %
PDW BLD-RTO: 15.8 % (ref 12.4–15.4)
PERFORMED ON: ABNORMAL
PERFORMED ON: ABNORMAL
PERFORMED ON: NORMAL
PLATELET # BLD: 119 K/UL (ref 135–450)
PMV BLD AUTO: 8.2 FL (ref 5–10.5)
POTASSIUM SERPL-SCNC: 3.5 MMOL/L (ref 3.5–5.1)
POTASSIUM SERPL-SCNC: 3.6 MMOL/L (ref 3.5–5.1)
RBC # BLD: 2.09 M/UL (ref 4–5.2)
SODIUM BLD-SCNC: 139 MMOL/L (ref 136–145)
SODIUM BLD-SCNC: 140 MMOL/L (ref 136–145)
TOTAL IRON BINDING CAPACITY: 217 UG/DL (ref 260–445)
TRANSFERRIN: 177 MG/DL (ref 200–360)
TROPONIN: <0.01 NG/ML
TSH REFLEX: 3.2 UIU/ML (ref 0.27–4.2)
WBC # BLD: 7.4 K/UL (ref 4–11)

## 2018-10-26 PROCEDURE — 84484 ASSAY OF TROPONIN QUANT: CPT

## 2018-10-26 PROCEDURE — 99024 POSTOP FOLLOW-UP VISIT: CPT | Performed by: NURSE PRACTITIONER

## 2018-10-26 PROCEDURE — 6370000000 HC RX 637 (ALT 250 FOR IP): Performed by: INTERNAL MEDICINE

## 2018-10-26 PROCEDURE — 85025 COMPLETE CBC W/AUTO DIFF WBC: CPT

## 2018-10-26 PROCEDURE — 97116 GAIT TRAINING THERAPY: CPT

## 2018-10-26 PROCEDURE — 85014 HEMATOCRIT: CPT

## 2018-10-26 PROCEDURE — 6370000000 HC RX 637 (ALT 250 FOR IP): Performed by: PHYSICIAN ASSISTANT

## 2018-10-26 PROCEDURE — 83036 HEMOGLOBIN GLYCOSYLATED A1C: CPT

## 2018-10-26 PROCEDURE — 97110 THERAPEUTIC EXERCISES: CPT

## 2018-10-26 PROCEDURE — 84165 PROTEIN E-PHORESIS SERUM: CPT

## 2018-10-26 PROCEDURE — P9016 RBC LEUKOCYTES REDUCED: HCPCS

## 2018-10-26 PROCEDURE — 2580000003 HC RX 258: Performed by: INTERNAL MEDICINE

## 2018-10-26 PROCEDURE — 36430 TRANSFUSION BLD/BLD COMPNT: CPT

## 2018-10-26 PROCEDURE — 2580000003 HC RX 258: Performed by: NURSE PRACTITIONER

## 2018-10-26 PROCEDURE — 93971 EXTREMITY STUDY: CPT

## 2018-10-26 PROCEDURE — 80048 BASIC METABOLIC PNL TOTAL CA: CPT

## 2018-10-26 PROCEDURE — 94760 N-INVAS EAR/PLS OXIMETRY 1: CPT

## 2018-10-26 PROCEDURE — 94640 AIRWAY INHALATION TREATMENT: CPT

## 2018-10-26 PROCEDURE — 94664 DEMO&/EVAL PT USE INHALER: CPT

## 2018-10-26 PROCEDURE — 84155 ASSAY OF PROTEIN SERUM: CPT

## 2018-10-26 PROCEDURE — 85018 HEMOGLOBIN: CPT

## 2018-10-26 PROCEDURE — 2700000000 HC OXYGEN THERAPY PER DAY

## 2018-10-26 PROCEDURE — 1200000000 HC SEMI PRIVATE

## 2018-10-26 PROCEDURE — 36415 COLL VENOUS BLD VENIPUNCTURE: CPT

## 2018-10-26 PROCEDURE — 6360000002 HC RX W HCPCS: Performed by: NURSE PRACTITIONER

## 2018-10-26 PROCEDURE — 83605 ASSAY OF LACTIC ACID: CPT

## 2018-10-26 RX ORDER — SENNA AND DOCUSATE SODIUM 50; 8.6 MG/1; MG/1
2 TABLET, FILM COATED ORAL DAILY PRN
Status: DISCONTINUED | OUTPATIENT
Start: 2018-10-26 | End: 2018-10-28 | Stop reason: HOSPADM

## 2018-10-26 RX ORDER — OXYCODONE HYDROCHLORIDE AND ACETAMINOPHEN 5; 325 MG/1; MG/1
1-2 TABLET ORAL EVERY 4 HOURS PRN
Qty: 10 TABLET | Refills: 0 | Status: SHIPPED | OUTPATIENT
Start: 2018-10-26 | End: 2018-11-09

## 2018-10-26 RX ORDER — MIDODRINE HYDROCHLORIDE 10 MG/1
10 TABLET ORAL
Qty: 90 TABLET | Refills: 3
Start: 2018-10-26

## 2018-10-26 RX ORDER — 0.9 % SODIUM CHLORIDE 0.9 %
250 INTRAVENOUS SOLUTION INTRAVENOUS ONCE
Status: COMPLETED | OUTPATIENT
Start: 2018-10-26 | End: 2018-10-27

## 2018-10-26 RX ADMIN — SODIUM CHLORIDE 250 ML: 9 INJECTION, SOLUTION INTRAVENOUS at 13:08

## 2018-10-26 RX ADMIN — CALCIUM 250 MG: 500 TABLET ORAL at 07:57

## 2018-10-26 RX ADMIN — PANTOPRAZOLE SODIUM 40 MG: 40 TABLET, DELAYED RELEASE ORAL at 07:58

## 2018-10-26 RX ADMIN — OXYCODONE HYDROCHLORIDE AND ACETAMINOPHEN 1 TABLET: 5; 325 TABLET ORAL at 10:19

## 2018-10-26 RX ADMIN — IRON SUCROSE 200 MG: 20 INJECTION, SOLUTION INTRAVENOUS at 21:04

## 2018-10-26 RX ADMIN — ALLOPURINOL 300 MG: 300 TABLET ORAL at 07:58

## 2018-10-26 RX ADMIN — ASPIRIN 325 MG: 325 TABLET, DELAYED RELEASE ORAL at 07:57

## 2018-10-26 RX ADMIN — MIDODRINE HYDROCHLORIDE 10 MG: 5 TABLET ORAL at 07:58

## 2018-10-26 RX ADMIN — OXYCODONE HYDROCHLORIDE AND ACETAMINOPHEN 2 TABLET: 5; 325 TABLET ORAL at 21:03

## 2018-10-26 RX ADMIN — MIDODRINE HYDROCHLORIDE 10 MG: 5 TABLET ORAL at 13:07

## 2018-10-26 RX ADMIN — CALCIUM 250 MG: 500 TABLET ORAL at 21:08

## 2018-10-26 RX ADMIN — TRAZODONE HYDROCHLORIDE 25 MG: 50 TABLET ORAL at 20:55

## 2018-10-26 RX ADMIN — DIVALPROEX SODIUM 125 MG: 125 CAPSULE, COATED PELLETS ORAL at 21:20

## 2018-10-26 RX ADMIN — DIVALPROEX SODIUM 125 MG: 125 CAPSULE, COATED PELLETS ORAL at 07:57

## 2018-10-26 RX ADMIN — ESCITALOPRAM OXALATE 10 MG: 10 TABLET ORAL at 07:58

## 2018-10-26 RX ADMIN — Medication 2 PUFF: at 20:10

## 2018-10-26 RX ADMIN — DIPHENHYDRAMINE HCL 50 MG: 25 TABLET ORAL at 01:55

## 2018-10-26 RX ADMIN — OXYCODONE HYDROCHLORIDE AND ACETAMINOPHEN 2 TABLET: 5; 325 TABLET ORAL at 05:15

## 2018-10-26 RX ADMIN — RISPERIDONE 0.25 MG: 0.5 TABLET ORAL at 21:02

## 2018-10-26 RX ADMIN — Medication 2 PUFF: at 08:42

## 2018-10-26 RX ADMIN — ASPIRIN 325 MG: 325 TABLET, DELAYED RELEASE ORAL at 20:56

## 2018-10-26 RX ADMIN — ATORVASTATIN CALCIUM 10 MG: 10 TABLET, FILM COATED ORAL at 20:55

## 2018-10-26 RX ADMIN — OXYCODONE HYDROCHLORIDE AND ACETAMINOPHEN 1 TABLET: 5; 325 TABLET ORAL at 14:17

## 2018-10-26 RX ADMIN — DOCUSATE SODIUM 100 MG: 100 CAPSULE, LIQUID FILLED ORAL at 07:58

## 2018-10-26 RX ADMIN — ACETAMINOPHEN 500 MG: 500 TABLET, FILM COATED ORAL at 17:20

## 2018-10-26 RX ADMIN — MIDODRINE HYDROCHLORIDE 10 MG: 5 TABLET ORAL at 17:20

## 2018-10-26 ASSESSMENT — PAIN SCALES - GENERAL
PAINLEVEL_OUTOF10: 6
PAINLEVEL_OUTOF10: 8
PAINLEVEL_OUTOF10: 8
PAINLEVEL_OUTOF10: 10
PAINLEVEL_OUTOF10: 4
PAINLEVEL_OUTOF10: 5
PAINLEVEL_OUTOF10: 6
PAINLEVEL_OUTOF10: 4
PAINLEVEL_OUTOF10: 6

## 2018-10-26 ASSESSMENT — PAIN DESCRIPTION - PROGRESSION

## 2018-10-26 ASSESSMENT — PAIN DESCRIPTION - ONSET
ONSET: ON-GOING
ONSET: ON-GOING

## 2018-10-26 ASSESSMENT — PAIN DESCRIPTION - DESCRIPTORS
DESCRIPTORS: ACHING
DESCRIPTORS: ACHING

## 2018-10-26 ASSESSMENT — PAIN DESCRIPTION - PAIN TYPE
TYPE: ACUTE PAIN
TYPE: SURGICAL PAIN
TYPE: SURGICAL PAIN

## 2018-10-26 ASSESSMENT — PAIN DESCRIPTION - FREQUENCY
FREQUENCY: INTERMITTENT
FREQUENCY: INTERMITTENT

## 2018-10-26 ASSESSMENT — PAIN DESCRIPTION - ORIENTATION
ORIENTATION: RIGHT

## 2018-10-26 ASSESSMENT — PAIN DESCRIPTION - LOCATION
LOCATION: KNEE

## 2018-10-26 NOTE — PROGRESS NOTES
at 10/26/18 0758    aluminum & magnesium hydroxide-simethicone (MAALOX) 200-200-20 MG/5ML suspension 30 mL  30 mL Oral Q4H PRN Lisa Wetzel PA-C        atorvastatin (LIPITOR) tablet 10 mg  10 mg Oral Nightly Lisa Wetzel PA-C   10 mg at 10/25/18 2020    calcium elemental (OSCAL) tablet 250 mg  250 mg Oral BID Lisa Wetzel PA-C   250 mg at 10/26/18 0757    diphenhydrAMINE (BENADRYL) tablet 50 mg  50 mg Oral Q8H PRN Lisa Wetzel PA-C   50 mg at 10/26/18 0155    divalproex (DEPAKOTE SPRINKLE) capsule 125 mg  125 mg Oral BID Lisa Wetzel PA-C   125 mg at 10/26/18 0757    docusate sodium (COLACE) capsule 100 mg  100 mg Oral Daily Lisa Wetzel PA-C   100 mg at 10/26/18 1496    escitalopram (LEXAPRO) tablet 10 mg  10 mg Oral Daily Lisa Wetzel PA-C   10 mg at 10/26/18 0758    ipratropium-albuterol (DUONEB) nebulizer solution 3 mL  1 vial Inhalation Q4H PRN Trev Carter MD        magnesium hydroxide (MILK OF MAGNESIA) 400 MG/5ML suspension 30 mL  30 mL Oral Daily PRN Lisa Wetzel PA-C        mupirocin (BACTROBAN) 2 % ointment   Nasal Daily Lisa Wetzel PA-C        pantoprazole (PROTONIX) tablet 40 mg  40 mg Oral Daily Lisa Wetzel PA-C   40 mg at 10/26/18 0758    risperiDONE (RISPERDAL) tablet 0.25 mg  0.25 mg Oral Nightly Lisa Wetzel PA-C   0.25 mg at 10/25/18 2258    traMADol (ULTRAM) tablet 50 mg  50 mg Oral Q6H PRN Lisa Wetzel PA-C   50 mg at 10/25/18 0430    traZODone (DESYREL) tablet 25 mg  25 mg Oral Nightly Lisa Wetzel PA-C   25 mg at 10/25/18 2257    sodium chloride flush 0.9 % injection 10 mL  10 mL Intravenous 2 times per day Lisa Wetzel PA-C        sodium chloride flush 0.9 % injection 10 mL  10 mL Intravenous PRN Lisa Wetzel PA-C        acetaminophen (TYLENOL) tablet 650 mg  650 mg Oral Q4H PRN Lisa Wetzel PA-C   650 mg at 10/24/18 0450    oxyCODONE-acetaminophen (PERCOCET) 5-325 MG per tablet 1 tablet  1 tablet Oral Q4H PRN Herman Sep, PA-C   1 tablet at 10/26/18 1417    Or    oxyCODONE-acetaminophen (PERCOCET) 5-325 MG per tablet 2 tablet  2 tablet Oral Q4H PRN Herman Sep, PA-C   2 tablet at 10/26/18 0515    ondansetron (ZOFRAN) injection 4 mg  4 mg Intravenous Q6H PRN Herman Sep, PA-C        glucose (GLUTOSE) 40 % oral gel 15 g  15 g Oral PRN Herman Sep, PA-C        dextrose 50 % solution 12.5 g  12.5 g Intravenous PRN Herman Sep, PA-C        glucagon (rDNA) injection 1 mg  1 mg Intramuscular PRN Herman Sep, PA-C        dextrose 5 % solution  100 mL/hr Intravenous PRN Herman Sep, PA-C        aspirin EC tablet 325 mg  325 mg Oral BID Herman Sep, PA-C   325 mg at 10/26/18 0757       Vitals:  BP 96/61   Pulse 101   Temp 98.5 °F (36.9 °C) (Oral)   Resp 18   Ht 5' 3\" (1.6 m)   Wt 155 lb 14.4 oz (70.7 kg)   SpO2 93%   BMI 27.62 kg/m²     Physical Exam:  General : AAOx3, not in pain or respiratory distress, resting in bed  HEENT : mucosa moist.  CVS: S1 S2 normal, regularrhythm, no murmurs or rubs. Lungs: Clear, no wheezing or crackles. Abd: Soft, bowel sounds normal, non-tender. Ext: no edema of LLE, right knee with surgical dressing, left knee with surgical scar  Skin: Warm. No rashes appreciated.   : bladder non-distended, no tenderness over the bladder  Neuro: Alert and oriented x 3, nonfocal.  Joints: No erythema noted overjoints.       Labs:  CBC:   Lab Results   Component Value Date    WBC 7.4 10/26/2018    RBC 2.09 10/26/2018    HGB 7.6 10/26/2018    HCT 23.3 10/26/2018    .3 10/26/2018    MCH 36.2 10/26/2018    MCHC 32.5 10/26/2018    RDW 15.8 10/26/2018     10/26/2018    MPV 8.2 10/26/2018     BMP:    Lab Results   Component Value Date     10/26/2018    K 3.6 10/26/2018    K 4.4 10/24/2018     10/26/2018    CO2 23 10/26/2018    BUN 18 10/26/2018    LABALBU 4.4 08/02/2018    CREATININE 0.9 10/26/2018    CALCIUM

## 2018-10-26 NOTE — PROGRESS NOTES
[facility] Orthopedic Surgery   Progress Note    CHIEF COMPLAINT/DIAGNOSIS:  10/23/18 RIGHT TKA    SUBJECTIVE: Patient sitting up in bed; states pain well controlled. No complaints of dizziness/SOB. Ready to DC to Rehab in AM when bed available. OBJECTIVE  Physical    VITALS:  BP (!) 79/48   Pulse 105   Temp 98.4 °F (36.9 °C) (Oral)   Resp 18   Ht 5' 3\" (1.6 m)   Wt 155 lb 14.4 oz (70.7 kg)   SpO2 95%   BMI 27.62 kg/m²    Last 3 BP readings:  79/48, 94/51, 94/50    GENERAL: Alert, NAD   MUSCULOSKELETAL: RIGHT LE  INCISION:  Overdressing (replaced yesterday) c/d/i  ROM: intact DF/PF  Sensory:  Intact to light touch in peroneal and tibial distributions  Vascular:   Intact dors ped pulse;  calf soft and tender proximal calf    Data    ALL MEDICATIONS HAVE BEEN REVIEWED    CBC:   Recent Labs      10/24/18   0027  10/25/18   0446   WBC  7.4  7.8   HGB  9.6*  8.5*   HCT  29.0*  26.2*   PLT  99*  102*     BMP:   Recent Labs      10/24/18   0027  10/25/18   0446   NA  138  140   K  4.4  4.0   CL  105  105   CO2  23  23   BUN  24*  16   CREATININE  0.8  0.8     INR:   Recent Labs      10/24/18   0027   INR  1.18*   10/23/18 CXR:  Streaky bandlike opacities in the left lung base.  Mild hazy opacity medially   in the right lung base.  Atelectasis versus infection   10/25/18 Lactic acid:  2.7    ASSESSMENT:  10/23/18 RIGHT TKA, POD#3  Ataxia  CKD    PLAN:   - WB status:  WBAT   - DVT prophylaxis: ASA  - PT/OT  - D/C Plan:  SNF Saturday AM  - Pain Control: current regimen. Due to orthopaedic surgical procedure/condition, patient may require pain medication for up to 6-8 weeks. - Post op hypotension:  Nephrology consulted; labs ordered.   DC on Midodrine; d/w hospitalist.  Will ck Echo, troponin, LA today  - Calf soreness:  Right LE Doppler  - PICC line for access; will need to be DC'd prior to discharge  - F U with Dr. Denise Jarvis 11/5/18 at 1:45 PM    [address] [place] [address], MICHEL, CNP  10/26/2018  9:25 AM    .

## 2018-10-26 NOTE — PROGRESS NOTES
Physical Therapy  Facility/Department: [address] ORTHO/NEURO NURSING  Daily Treatment Note  NAME: Gertrudis Eden  : 1940  MRN: [id number]    Date of Service: 10/26/2018    Discharge Recommendations:Patricia Marvin scored a 17/24 on the AM-PAC short mobility form. Current research shows that an AM-PAC score of 17 or less is typically not associated with a discharge to the patient's home setting. Based on the patients AM-PAC score and their current functional mobility deficits, it is recommended that the patient have 3-5 sessions per week of Physical Therapy at d/c to increase the patients independence. PT Equipment Recommendations  Equipment Needed: Yes  Walker: Rolling  Other: May defer to next level of care    Patient Diagnosis(es): The primary encounter diagnosis was Preop testing. A diagnosis of Primary osteoarthritis of right knee was also pertinent to this visit. has a past medical history of Abdominal aneurysm ([address] Anemia; Anesthesia complication; Aortic aneurysm ([address] Arthritis; At risk for falls; Ataxia; Avascular necrosis ([address] Back pain; Bipolar 1 disorder ([address] Blood transfusion; CAD (coronary artery disease); Chronic kidney disease; Clostridium difficile infection; Colitis due to Clostridium difficile; Confusion; COPD (chronic obstructive pulmonary disease) ([address] Dementia; Depression; Diabetes ([address] Diabetes mellitus ([address] Diarrhea; Dysuria; Fracture of right acetabulum (HCC); GERD (gastroesophageal reflux disease); Gout; H/O migraine; Hyperlipidemia; Hypertension; Liver disease; Major depressive disorder; Neuropathy; Other disorders of kidney and ureter; Pneumonia; Prolonged emergence from general anesthesia; Shoulder (girdle) dystocia during labor and deliver, delivered; Type II or unspecified type diabetes mellitus without mention of complication, not stated as uncontrolled; UTI (urinary tract infection); Vitamin D deficiency; Weakness; and Wrist fracture.    has a assistance  Comment: VCs for hand placement  Ambulation  Ambulation?: Yes  Ambulation 1  Surface: level tile  Device: Rolling Walker  Assistance: Contact guard assistance  Quality of Gait: slow som, limp R LE  Distance: Pt amb 20 ft with RW and CGA. Comments: Pt reporting dizziness during walking and wanting to return to bed. Pt back to supine and BP 92/57 which is higher than previously recorded by nursing 30 min prior. Pt dizziness resolved immed upon laying down. Balance  Posture: Fair  Sitting - Static: Good  Sitting - Dynamic: Good;-  Standing - Static: Fair;+ (with RW)  Standing - Dynamic: Fair (with RW)  Comments: Stands with a forward flexed posture  Exercises  Quad Sets: x 10 bilat supine  Heelslides: x 10 with mod A  Knee Long Arc Quad: x 10 R LE  Knee Short Arc Quad: x 10 R LE  Knee Active Range of Motion: 0-3-90 deg R knee ext to flex  Ankle Pumps: x 10 bilat                        Assessment   Body structures, Functions, Activity limitations: Decreased functional mobility ; Decreased ROM; Decreased strength;Decreased endurance;Decreased balance;Decreased coordination  Assessment: CGA for transfers and gait with RW, low endurance for activity. The pt presents with decreased balance, ROM, strength and activity tolerance s/p R TKR. Despite vitals improved vital reading at start of session, pt fatigues quickly.  Continues to require skilled PT to improve functional mobility  Treatment Diagnosis: Impaired functional mobility  Prognosis: Good  Patient Education: PT eval, POC, HEP, Knee ROM and removal of immobilizer  REQUIRES PT FOLLOW UP: Yes  Activity Tolerance  Activity Tolerance: Patient Tolerated treatment well     G-Code     OutComes Score                                                    AM-PAC Score  AM-PAC Inpatient Mobility Raw Score : 17  AM-PAC Inpatient T-Scale Score : 42.13  Mobility Inpatient CMS 0-100% Score: 50.57  Mobility Inpatient CMS G-Code Modifier : CK          Goals  Short

## 2018-10-26 NOTE — PROGRESS NOTES
No acute process. Problem List  Principal Problem:    10/23/18 RIGHT TKA  Active Problems:    COPD (chronic obstructive pulmonary disease) (HCC)    Abdominal aortic aneurysm dissection (HCC)    Gastritis    High cholesterol    DM2 (diabetes mellitus, type 2) (HCC)    Mood disorder (HCC)    Acute blood loss as cause of postoperative anemia    Thrombocytopenia (HCC)    Hypotension  Resolved Problems:    * No resolved hospital problems. *       Assessment & Plan:   1. Cont Midodrine  2. Suspect this is ABLA given hypotension/tachycardia, will order 2 U PRBC if Hg < 9 (Hg was 13 in August)  3. Tylenol 500 mg PO q6h  4. DC insulin, repeat HgA1C (was 5.5% in Aug)  5. Cont Dulera  6. Repeat LA  7. Check limited Echo to assess EF and r/o WMA given hypotension  8. Check troponin X 1  9. Will see if Renal thinks we need to resume Lasix (will order b/w 2 U PRBC X 1, has been on Lasix as OP that was stopped here due to hypotension)    IV Access:  LUE midline (10/24/18)  Edgar:  No  Diet: DIET GENERAL;  Code:Full Code  DVT PPX  ASA bid  Disposition SNF    Discussed with patient, Dr Benny Galeas (Renal), Marlo Gonzales (Ortho NP) nursing and CM. Likely needs blood today. Will f/u labs and order PRBC once CBC back. Anticipate DC to [facility] in AM on Saturday. Work up to be completed by Renal today. Shante Bishop MD   10/26/2018 9:31 AM    ADDENDUM:     Hg is 7.6. Transfuse 2 U PRBC today. D/W Marlo Gonzales and RN.     Shante Bishop MD

## 2018-10-26 NOTE — PLAN OF CARE
Problem: Musculor/Skeletal Functional Status  Goal: Absence of falls  Outcome: Ongoing  Patient absence of falls    Problem: Pain:  Goal: Control of acute pain  Control of acute pain   Outcome: Ongoing  Acute pain controlled  Goal: Control of chronic pain  Control of chronic pain   Chronic pain comtrolled    Problem: Falls - Risk of:  Goal: Will remain free from falls  Will remain free from falls   Outcome: Ongoing  Patient has remained free from falls

## 2018-10-27 ENCOUNTER — APPOINTMENT (OUTPATIENT)
Dept: CT IMAGING | Age: 78
DRG: 470 | End: 2018-10-27
Attending: ORTHOPAEDIC SURGERY
Payer: MEDICARE

## 2018-10-27 LAB
ANION GAP SERPL CALCULATED.3IONS-SCNC: 13 MMOL/L (ref 3–16)
BASOPHILS ABSOLUTE: 0 K/UL (ref 0–0.2)
BASOPHILS RELATIVE PERCENT: 0.3 %
BILIRUBIN URINE: NEGATIVE
BLOOD, URINE: NEGATIVE
BUN BLDV-MCNC: 15 MG/DL (ref 7–20)
CALCIUM SERPL-MCNC: 8.3 MG/DL (ref 8.3–10.6)
CHLORIDE BLD-SCNC: 102 MMOL/L (ref 99–110)
CLARITY: ABNORMAL
CO2: 24 MMOL/L (ref 21–32)
COLOR: YELLOW
CREAT SERPL-MCNC: 0.8 MG/DL (ref 0.6–1.2)
CREATININE URINE: 89.7 MG/DL (ref 28–259)
EOSINOPHILS ABSOLUTE: 0.1 K/UL (ref 0–0.6)
EOSINOPHILS RELATIVE PERCENT: 1.8 %
EPITHELIAL CELLS, UA: 1 /HPF (ref 0–5)
GFR AFRICAN AMERICAN: >60
GFR NON-AFRICAN AMERICAN: >60
GLUCOSE BLD-MCNC: 109 MG/DL (ref 70–99)
GLUCOSE BLD-MCNC: 95 MG/DL (ref 70–99)
GLUCOSE URINE: NEGATIVE MG/DL
HCT VFR BLD CALC: 32.5 % (ref 36–48)
HEMOGLOBIN: 10.5 G/DL (ref 12–16)
HYALINE CASTS: 0 /LPF (ref 0–8)
KETONES, URINE: NEGATIVE MG/DL
LEFT VENTRICULAR EJECTION FRACTION HIGH VALUE: 55 %
LEFT VENTRICULAR EJECTION FRACTION MODE: NORMAL
LEUKOCYTE ESTERASE, URINE: NEGATIVE
LYMPHOCYTES ABSOLUTE: 1 K/UL (ref 1–5.1)
LYMPHOCYTES RELATIVE PERCENT: 16.5 %
MCH RBC QN AUTO: 33.7 PG (ref 26–34)
MCHC RBC AUTO-ENTMCNC: 32.3 G/DL (ref 31–36)
MCV RBC AUTO: 104.6 FL (ref 80–100)
MICROSCOPIC EXAMINATION: YES
MONOCYTES ABSOLUTE: 0.8 K/UL (ref 0–1.3)
MONOCYTES RELATIVE PERCENT: 13 %
NEUTROPHILS ABSOLUTE: 4.2 K/UL (ref 1.7–7.7)
NEUTROPHILS RELATIVE PERCENT: 68.4 %
NITRITE, URINE: NEGATIVE
PDW BLD-RTO: 20.2 % (ref 12.4–15.4)
PERFORMED ON: ABNORMAL
PH UA: 5.5
PLATELET # BLD: 131 K/UL (ref 135–450)
PMV BLD AUTO: 7.4 FL (ref 5–10.5)
POTASSIUM SERPL-SCNC: 3.9 MMOL/L (ref 3.5–5.1)
PROTEIN PROTEIN: 0.02 G/DL
PROTEIN PROTEIN: 21 MG/DL
PROTEIN UA: NEGATIVE MG/DL
RBC # BLD: 3.11 M/UL (ref 4–5.2)
RBC UA: 1 /HPF (ref 0–4)
SODIUM BLD-SCNC: 139 MMOL/L (ref 136–145)
SPECIFIC GRAVITY UA: 1.02
URINE REFLEX TO CULTURE: ABNORMAL
URINE TYPE: ABNORMAL
UROBILINOGEN, URINE: 0.2 E.U./DL
WBC # BLD: 6.1 K/UL (ref 4–11)
WBC UA: 0 /HPF (ref 0–5)

## 2018-10-27 PROCEDURE — 6370000000 HC RX 637 (ALT 250 FOR IP): Performed by: INTERNAL MEDICINE

## 2018-10-27 PROCEDURE — 84166 PROTEIN E-PHORESIS/URINE/CSF: CPT

## 2018-10-27 PROCEDURE — 6370000000 HC RX 637 (ALT 250 FOR IP): Performed by: ORTHOPAEDIC SURGERY

## 2018-10-27 PROCEDURE — 80048 BASIC METABOLIC PNL TOTAL CA: CPT

## 2018-10-27 PROCEDURE — 6370000000 HC RX 637 (ALT 250 FOR IP): Performed by: PHYSICIAN ASSISTANT

## 2018-10-27 PROCEDURE — 94760 N-INVAS EAR/PLS OXIMETRY 1: CPT

## 2018-10-27 PROCEDURE — 2700000000 HC OXYGEN THERAPY PER DAY

## 2018-10-27 PROCEDURE — 97110 THERAPEUTIC EXERCISES: CPT

## 2018-10-27 PROCEDURE — 36415 COLL VENOUS BLD VENIPUNCTURE: CPT

## 2018-10-27 PROCEDURE — 97530 THERAPEUTIC ACTIVITIES: CPT

## 2018-10-27 PROCEDURE — 6360000004 HC RX CONTRAST MEDICATION: Performed by: ORTHOPAEDIC SURGERY

## 2018-10-27 PROCEDURE — 93308 TTE F-UP OR LMTD: CPT

## 2018-10-27 PROCEDURE — 81001 URINALYSIS AUTO W/SCOPE: CPT

## 2018-10-27 PROCEDURE — 97116 GAIT TRAINING THERAPY: CPT

## 2018-10-27 PROCEDURE — 2580000003 HC RX 258: Performed by: PHYSICIAN ASSISTANT

## 2018-10-27 PROCEDURE — 2580000003 HC RX 258: Performed by: INTERNAL MEDICINE

## 2018-10-27 PROCEDURE — 84156 ASSAY OF PROTEIN URINE: CPT

## 2018-10-27 PROCEDURE — 71260 CT THORAX DX C+: CPT

## 2018-10-27 PROCEDURE — 82570 ASSAY OF URINE CREATININE: CPT

## 2018-10-27 PROCEDURE — 1200000000 HC SEMI PRIVATE

## 2018-10-27 PROCEDURE — 85025 COMPLETE CBC W/AUTO DIFF WBC: CPT

## 2018-10-27 PROCEDURE — 94640 AIRWAY INHALATION TREATMENT: CPT

## 2018-10-27 RX ORDER — IRON POLYSACCHARIDE COMPLEX 150 MG
150 CAPSULE ORAL DAILY
Status: DISCONTINUED | OUTPATIENT
Start: 2018-10-27 | End: 2018-10-28 | Stop reason: HOSPADM

## 2018-10-27 RX ADMIN — DOCUSATE SODIUM 100 MG: 100 CAPSULE, LIQUID FILLED ORAL at 09:34

## 2018-10-27 RX ADMIN — Medication 10 ML: at 09:35

## 2018-10-27 RX ADMIN — CALCIUM 250 MG: 500 TABLET ORAL at 22:24

## 2018-10-27 RX ADMIN — DIPHENHYDRAMINE HCL 50 MG: 25 TABLET ORAL at 13:05

## 2018-10-27 RX ADMIN — ALLOPURINOL 300 MG: 300 TABLET ORAL at 09:33

## 2018-10-27 RX ADMIN — OXYCODONE HYDROCHLORIDE AND ACETAMINOPHEN 2 TABLET: 5; 325 TABLET ORAL at 05:10

## 2018-10-27 RX ADMIN — ACETAMINOPHEN 500 MG: 500 TABLET, FILM COATED ORAL at 12:38

## 2018-10-27 RX ADMIN — MIDODRINE HYDROCHLORIDE 10 MG: 5 TABLET ORAL at 17:59

## 2018-10-27 RX ADMIN — DIPHENHYDRAMINE HCL 50 MG: 25 TABLET ORAL at 04:44

## 2018-10-27 RX ADMIN — OXYCODONE HYDROCHLORIDE AND ACETAMINOPHEN 2 TABLET: 5; 325 TABLET ORAL at 22:26

## 2018-10-27 RX ADMIN — DIVALPROEX SODIUM 125 MG: 125 CAPSULE, COATED PELLETS ORAL at 09:33

## 2018-10-27 RX ADMIN — ATORVASTATIN CALCIUM 10 MG: 10 TABLET, FILM COATED ORAL at 22:21

## 2018-10-27 RX ADMIN — IOPAMIDOL 75 ML: 755 INJECTION, SOLUTION INTRAVENOUS at 11:24

## 2018-10-27 RX ADMIN — Medication 2 PUFF: at 09:14

## 2018-10-27 RX ADMIN — ASPIRIN 325 MG: 325 TABLET, DELAYED RELEASE ORAL at 09:33

## 2018-10-27 RX ADMIN — Medication 150 MG: at 13:05

## 2018-10-27 RX ADMIN — CALCIUM 250 MG: 500 TABLET ORAL at 09:34

## 2018-10-27 RX ADMIN — ESCITALOPRAM OXALATE 10 MG: 10 TABLET ORAL at 09:34

## 2018-10-27 RX ADMIN — ASPIRIN 325 MG: 325 TABLET, DELAYED RELEASE ORAL at 22:24

## 2018-10-27 RX ADMIN — OXYCODONE HYDROCHLORIDE AND ACETAMINOPHEN 1 TABLET: 5; 325 TABLET ORAL at 16:45

## 2018-10-27 RX ADMIN — RISPERIDONE 0.25 MG: 0.5 TABLET ORAL at 22:21

## 2018-10-27 RX ADMIN — MIDODRINE HYDROCHLORIDE 10 MG: 5 TABLET ORAL at 12:38

## 2018-10-27 RX ADMIN — PANTOPRAZOLE SODIUM 40 MG: 40 TABLET, DELAYED RELEASE ORAL at 09:33

## 2018-10-27 RX ADMIN — TRAZODONE HYDROCHLORIDE 25 MG: 50 TABLET ORAL at 22:23

## 2018-10-27 RX ADMIN — MIDODRINE HYDROCHLORIDE 10 MG: 5 TABLET ORAL at 09:34

## 2018-10-27 RX ADMIN — MUPIROCIN: 20 OINTMENT TOPICAL at 09:39

## 2018-10-27 RX ADMIN — OXYCODONE HYDROCHLORIDE AND ACETAMINOPHEN 1 TABLET: 5; 325 TABLET ORAL at 09:33

## 2018-10-27 RX ADMIN — DIVALPROEX SODIUM 125 MG: 125 CAPSULE, COATED PELLETS ORAL at 22:22

## 2018-10-27 ASSESSMENT — PAIN SCALES - GENERAL
PAINLEVEL_OUTOF10: 6
PAINLEVEL_OUTOF10: 0
PAINLEVEL_OUTOF10: 5
PAINLEVEL_OUTOF10: 3
PAINLEVEL_OUTOF10: 10
PAINLEVEL_OUTOF10: 4
PAINLEVEL_OUTOF10: 5
PAINLEVEL_OUTOF10: 7

## 2018-10-27 ASSESSMENT — PAIN DESCRIPTION - ORIENTATION
ORIENTATION: RIGHT

## 2018-10-27 ASSESSMENT — PAIN DESCRIPTION - LOCATION
LOCATION: KNEE

## 2018-10-27 ASSESSMENT — PAIN DESCRIPTION - PAIN TYPE
TYPE: SURGICAL PAIN
TYPE: SURGICAL PAIN
TYPE: ACUTE PAIN;SURGICAL PAIN
TYPE: SURGICAL PAIN

## 2018-10-27 NOTE — PROGRESS NOTES
Or    oxyCODONE-acetaminophen (PERCOCET) 5-325 MG per tablet 2 tablet  2 tablet Oral Q4H PRN Tinnie Crosby, PA-C   2 tablet at 10/27/18 0510    ondansetron (ZOFRAN) injection 4 mg  4 mg Intravenous Q6H PRN Tinnie Crosby, PA-C        glucose (GLUTOSE) 40 % oral gel 15 g  15 g Oral PRN Tinnie Crosby, PA-C        dextrose 50 % solution 12.5 g  12.5 g Intravenous PRN Tinnie Crosby, PA-C        glucagon (rDNA) injection 1 mg  1 mg Intramuscular PRN Tinnie Crosby, PA-C        dextrose 5 % solution  100 mL/hr Intravenous PRN Tinnie Crosby, PA-C        aspirin EC tablet 325 mg  325 mg Oral BID Tinnie Crosby, PA-C   325 mg at 10/27/18 2197       Vitals:  BP 93/62   Pulse 96   Temp 99 °F (37.2 °C) (Oral)   Resp 16   Ht 5' 3\" (1.6 m)   Wt 155 lb 14.4 oz (70.7 kg)   SpO2 93%   BMI 27.62 kg/m²     Physical Exam:  General : AAOx3, not in pain or respiratory distress, resting in bed  HEENT : mucosa moist.  CVS: S1 S2 normal, regularrhythm, no murmurs or rubs. Lungs: Clear, no wheezing or crackles. Abd: Soft, bowel sounds normal, non-tender. Ext: no edema of LLE, right knee with surgical dressing, left knee with surgical scar  Skin: Warm. No rashes appreciated.   : bladder non-distended, no tenderness over the bladder  Neuro: Alert and oriented x 3, nonfocal.  Joints: No erythema noted overjoints.       Labs:  CBC:   Lab Results   Component Value Date    WBC 6.1 10/27/2018    RBC 3.11 10/27/2018    HGB 10.5 10/27/2018    HCT 32.5 10/27/2018    .6 10/27/2018    MCH 33.7 10/27/2018    MCHC 32.3 10/27/2018    RDW 20.2 10/27/2018     10/27/2018    MPV 7.4 10/27/2018     BMP:    Lab Results   Component Value Date     10/27/2018    K 3.9 10/27/2018    K 4.4 10/24/2018     10/27/2018    CO2 24 10/27/2018    BUN 15 10/27/2018    LABALBU 4.4 08/02/2018    CREATININE 0.8 10/27/2018    CALCIUM 8.3 10/27/2018    GFRAA >60 10/27/2018    GFRAA >60 02/04/2013    LABGLOM >60

## 2018-10-27 NOTE — PROGRESS NOTES
past surgical history that includes Kidney stone surgery; Hysterectomy; Tonsillectomy; back surgery; Carotid endarterectomy (5/16/11); Colonoscopy; Abdomen surgery; fracture surgery; Appendectomy (1960); Cholecystectomy; vascular surgery; eye surgery; ERCP (2-1-2013); Abdominal aortic aneurysm repair (8/5/2013); Upper gastrointestinal endoscopy (4-28-14); Wrist surgery (Right, 2/12/16); joint replacement; joint replacement (Left, 09/28/2016); Endoscopy, colon, diagnostic; shoulder surgery (Right, 05/02/2017); and pr total knee arthroplasty (Right, 10/23/2018). Restrictions  Restrictions/Precautions  Restrictions/Precautions: Fall Risk, Weight Bearing (high fall risk)  Required Braces or Orthoses?: No  Lower Extremity Weight Bearing Restrictions  Right Lower Extremity Weight Bearing: Weight Bearing As Tolerated  Required Braces or Orthoses  Right Lower Extremity Brace:  (knee immobilizer d/c'd by PT on 10/24)  Position Activity Restriction  Other position/activity restrictions: Admitted for a R TKR. Subjective   General  Chart Reviewed: Yes  Response To Previous Treatment: Patient with no complaints from previous session. Family / Caregiver Present: No  Subjective  Subjective: Pt reports 4/10 pain. Agreeable to working with PT. General Comment  Comments: Pt supine in bed upon arrival. Doppler L LE neg for DVT. Slightly less swelling today. Pain Screening  Patient Currently in Pain: Yes  Pain Assessment  Pain Assessment: 0-10  Pain Level: 4  Pain Type: Surgical pain  Pain Location: Knee  Pain Orientation: Right  Pain Intervention(s): Repositioned; Ambulation/Increased activity  Response to Pain Intervention: Patient Satisfied  Vital Signs  Patient Currently in Pain: Yes       Orientation  Orientation  Overall Orientation Status: Impaired  Orientation Level: Oriented to place;Oriented to time;Oriented to person;Disoriented to situation (mild confusion)  Cognition      Objective   Bed mobility  Supine to Sit:

## 2018-10-27 NOTE — PROGRESS NOTES
0.9  0.9  0.8   GLUCOSE  86  97  95         Problem List  Principal Problem:    10/23/18 RIGHT TKA  Active Problems:    COPD (chronic obstructive pulmonary disease) (HCC)    Abdominal aortic aneurysm dissection (HCC)    Gastritis    High cholesterol    DM2 (diabetes mellitus, type 2) (HCC)    Mood disorder (HCC)    Acute blood loss as cause of postoperative anemia    Thrombocytopenia (HCC)    Hypotension  Resolved Problems:    * No resolved hospital problems. *       Assessment & Plan:   1. Hypotension-echo unremarkable. Check CTPA especially in light of hypoxia. 2. R knee swelling-post op. Venous duplex was negative. ? Need for abx. Discussed with Dr Zoe Juarez has assessed. 3. Acute respiratory failure-check ctpa. Has copd but no exacerbation and normally does not wear oxygen.        Diet: DIET GENERAL;  Code:Full Code  DVT PPX: asa bid      Patrice Acuna PA-C   10/27/2018 10:46 AM

## 2018-10-27 NOTE — PROGRESS NOTES
optimized, may be ready by tomorrow. - Pain Control: current regimen. Due to orthopaedic surgical procedure/condition, patient may require pain medication for up to 6-8 weeks. - Post op hypotension:  Nephrology consulted;  DC on Midodrine; Cardiac Echo with EF 55%, remaining data pendin, troponin negative,   - Calf soreness:  Right LE Doppler negative  - PICC line for access; will need to be DC'd prior to discharge  - F U with Dr. Rustam Olivares 11/5/18 at 1:45 PM  - hypoxia: positive fluid balance vs PE vs remnants of her smoking hx as she quit 3 years ago from a prior 0.1ppd 50 year smoking hx.     Anny Marcial MD  10/27/2018  11:26 AM    .

## 2018-10-28 VITALS
TEMPERATURE: 98.4 F | SYSTOLIC BLOOD PRESSURE: 112 MMHG | BODY MASS INDEX: 27.62 KG/M2 | HEART RATE: 85 BPM | RESPIRATION RATE: 18 BRPM | OXYGEN SATURATION: 95 % | WEIGHT: 155.9 LBS | HEIGHT: 63 IN | DIASTOLIC BLOOD PRESSURE: 67 MMHG

## 2018-10-28 LAB — BLOOD BANK INCOMPATIBLE UNIT FORM: NORMAL

## 2018-10-28 PROCEDURE — 94640 AIRWAY INHALATION TREATMENT: CPT

## 2018-10-28 PROCEDURE — 6370000000 HC RX 637 (ALT 250 FOR IP): Performed by: PHYSICIAN ASSISTANT

## 2018-10-28 PROCEDURE — 94760 N-INVAS EAR/PLS OXIMETRY 1: CPT

## 2018-10-28 PROCEDURE — 2700000000 HC OXYGEN THERAPY PER DAY

## 2018-10-28 PROCEDURE — 97530 THERAPEUTIC ACTIVITIES: CPT

## 2018-10-28 PROCEDURE — 97110 THERAPEUTIC EXERCISES: CPT

## 2018-10-28 PROCEDURE — 6370000000 HC RX 637 (ALT 250 FOR IP): Performed by: ORTHOPAEDIC SURGERY

## 2018-10-28 PROCEDURE — 97116 GAIT TRAINING THERAPY: CPT

## 2018-10-28 PROCEDURE — 6370000000 HC RX 637 (ALT 250 FOR IP): Performed by: INTERNAL MEDICINE

## 2018-10-28 RX ADMIN — OXYCODONE HYDROCHLORIDE AND ACETAMINOPHEN 2 TABLET: 5; 325 TABLET ORAL at 04:27

## 2018-10-28 RX ADMIN — ASPIRIN 325 MG: 325 TABLET, DELAYED RELEASE ORAL at 08:53

## 2018-10-28 RX ADMIN — ALLOPURINOL 300 MG: 300 TABLET ORAL at 08:54

## 2018-10-28 RX ADMIN — Medication 2 PUFF: at 08:16

## 2018-10-28 RX ADMIN — DIVALPROEX SODIUM 125 MG: 125 CAPSULE, COATED PELLETS ORAL at 08:53

## 2018-10-28 RX ADMIN — OXYCODONE HYDROCHLORIDE AND ACETAMINOPHEN 1 TABLET: 5; 325 TABLET ORAL at 08:53

## 2018-10-28 RX ADMIN — PANTOPRAZOLE SODIUM 40 MG: 40 TABLET, DELAYED RELEASE ORAL at 08:52

## 2018-10-28 RX ADMIN — Medication 150 MG: at 08:53

## 2018-10-28 RX ADMIN — ESCITALOPRAM OXALATE 10 MG: 10 TABLET ORAL at 08:53

## 2018-10-28 RX ADMIN — DOCUSATE SODIUM 100 MG: 100 CAPSULE, LIQUID FILLED ORAL at 08:53

## 2018-10-28 RX ADMIN — MUPIROCIN: 20 OINTMENT TOPICAL at 08:55

## 2018-10-28 RX ADMIN — OXYCODONE HYDROCHLORIDE AND ACETAMINOPHEN 1 TABLET: 5; 325 TABLET ORAL at 13:04

## 2018-10-28 RX ADMIN — MIDODRINE HYDROCHLORIDE 10 MG: 5 TABLET ORAL at 12:19

## 2018-10-28 RX ADMIN — MIDODRINE HYDROCHLORIDE 10 MG: 5 TABLET ORAL at 08:53

## 2018-10-28 RX ADMIN — CALCIUM 250 MG: 500 TABLET ORAL at 08:53

## 2018-10-28 ASSESSMENT — PAIN DESCRIPTION - ORIENTATION
ORIENTATION: RIGHT

## 2018-10-28 ASSESSMENT — PAIN DESCRIPTION - LOCATION
LOCATION: KNEE

## 2018-10-28 ASSESSMENT — PAIN SCALES - GENERAL
PAINLEVEL_OUTOF10: 6
PAINLEVEL_OUTOF10: 5
PAINLEVEL_OUTOF10: 5
PAINLEVEL_OUTOF10: 6
PAINLEVEL_OUTOF10: 7
PAINLEVEL_OUTOF10: 0

## 2018-10-28 ASSESSMENT — PAIN DESCRIPTION - DESCRIPTORS: DESCRIPTORS: ACHING

## 2018-10-28 ASSESSMENT — PAIN DESCRIPTION - PAIN TYPE
TYPE: SURGICAL PAIN

## 2018-10-28 ASSESSMENT — PAIN DESCRIPTION - FREQUENCY: FREQUENCY: INTERMITTENT

## 2018-10-28 NOTE — PROGRESS NOTES
[facility] Orthopedic Surgery   Progress Note    CHIEF COMPLAINT/DIAGNOSIS:  10/23/18 RIGHT TKA    SUBJECTIVE: Patient sitting up in bed; states pain well controlled. No complaints of dizziness/SOB. 2 units PRBCs yesterday, venofer yesterday. Sats dipped with PT activity today to 88% and improved to 92 % when supplemental O2 placed. Is voiding, but still with likely positive fluid balance since sx. OBJECTIVE  Physical    VITALS:  /70   Pulse 102   Temp 98.4 °F (36.9 °C) (Oral)   Resp 17   Ht 5' 3\" (1.6 m)   Wt 155 lb 14.4 oz (70.7 kg)   SpO2 91%   BMI 27.62 kg/m²    Last 3 BP readings:  115/79, 116/75 and  93/62    GENERAL: Alert, NAD   MUSCULOSKELETAL: RIGHT LE  INCISION:  Overdressing (replaced Thrusday) c/d/i, mild bruising and trace lateral erythema compatible with her surgery. There are some skin blisters at the margin of the dressing consistent with fluid shifts / swelling and increasing time with the leg dependent. These changes are stable from yesterday. ROM: intact DF/PF  Sensory:  Intact to light touch in peroneal and tibial distributions  Vascular:   Intact dors ped pulse;  calf soft and tender proximal calf    Data    ALL MEDICATIONS HAVE BEEN REVIEWED    CBC:   Recent Labs      10/26/18   0958  10/26/18   2203  10/27/18   0849   WBC  7.4   --   6.1   HGB  7.6*  10.0*  10.5*   HCT  23.3*  29.9*  32.5*   PLT  119*   --   131*     BMP:   Recent Labs      10/26/18   0904  10/26/18   0958  10/27/18   0849   NA  140  139  139   K  3.5  3.6  3.9   CL  102  101  102   CO2  22  23  24   BUN  18  18  15   CREATININE  0.9  0.9  0.8        10/23/18 CXR:  Streaky bandlike opacities in the left lung base.  Mild hazy opacity medially   in the right lung base.  Atelectasis versus infection   10/25/18 Lactic acid:  2.7    ASSESSMENT:  10/23/18 RIGHT TKA, POD#5  Ataxia  CKD  Hypoxia - CT chest negative of PE, + atelectasis.     PLAN:   - WB status:  WBAT   - DVT prophylaxis: ASA  - PT/OT  - D/C Plan:  SNF

## 2018-10-28 NOTE — PROGRESS NOTES
Shift assessment completed. Patient alert, oriented x4. Patient was assisted back to bed from chair with walker, patient tolerated well. Neuro checks completed, see flow sheets. Patient resting in bed. R knee noted to have swelling and warmth along with blisters. Dressing to knee is dry and intact with old drainage. POC discussed with patient. The care plan and education has been reviewed and mutually agreed upon with the patient. Bed alarm on. Call light in reach. Will monitor.

## 2018-10-28 NOTE — PLAN OF CARE
Problem: Mobility - Impaired:  Intervention: Motor function assessment  . Intervention: Manage a safe environment  . Goal: Mobility will improve  Mobility will improve   Outcome: Ongoing  Mobility will improve. Problem: Pain - Acute:  Goal: Pain level will decrease  Pain level will decrease    Outcome: Ongoing  Pain level will decrease. Problem: Mental Status - Impaired:  Intervention: Assess signs and symptoms of altered mental status  . Intervention: Surveillance of patient  . Problem: Pain:  Intervention: Opioid analgesia side-effects  . Intervention: Assess barriers to pain control  . Intervention: Promote participation in pain management plan  . Goal: Control of acute pain  Control of acute pain   Outcome: Ongoing  Control of acute pain. Goal: Pain level will decrease  Pain level will decrease    Outcome: Ongoing  Pain level will decrease. Problem: Falls - Risk of: Intervention: Assess potential safety hazards  . Intervention: Assess risk factors for falls  . Intervention: Assess medication that may increase risk of fall  . Intervention: Gait assessment  . Intervention: Assist ambulation  . Intervention: Toileting assistance  . Intervention: Appropriate assistance to ensure safe transfer  . Intervention: Manage a safe environment  . Goal: Will remain free from falls  Will remain free from falls   Outcome: Ongoing  Will remain free from falls. Comments: The care plan and education has been reviewed and mutually agreed upon with the patient.

## 2018-10-28 NOTE — PROGRESS NOTES
Call from Patient's son who wants a phone call as to when patient will be ready for  to take to Rehab. Ria Matthews made aware.

## 2018-10-28 NOTE — CARE COORDINATION
Discharge Plan:     Patient discharged to: [facility]  SW/DC Planner faxed, [address] and [address] [place]:[phone]  Prescriptions faxed were: Oxycodone  RN: Hamida Snowden will call report to:    [phone]  Son transporting  Family advised of discharge?:Yes  HENS Submitted?:   Yes      Electronically signed by SAMMIE Rodriguez on 10/28/2018 at 11:03 AM

## 2018-10-28 NOTE — PROGRESS NOTES
for safety  Problem Solving: Assistance required to generate solutions;Assistance required to implement solutions  Insights: Decreased awareness of deficits  Initiation: Requires cues for some  Sequencing: Requires cues for some  Objective   Bed mobility  Supine to Sit: Supervision  Sit to Supine: Unable to assess (did not return to bed)  Scooting: Supervision  Transfers  Sit to Stand: Contact guard assistance  Stand to sit: Contact guard assistance (heavy VCs for reaching before sitting and bringing walker to chair )  Ambulation  Ambulation?: Yes  Ambulation 1  Surface: level tile  Device: Rolling Walker  Other Apparatus: O2  Assistance: Contact guard assistance  Quality of Gait: decreased R knee flexion, small step length on R, decreased stance time on R, genu valgus on R  Distance: from bed to bathroom (x 10 feet) and from bathroom out into alexandre and back to chair (x 50 feet)  Comments: O2 sat during activity remained at 93% while on 2L of O2, fatigued at end of amb trial      Balance  Posture: Fair  Sitting - Static: Good  Sitting - Dynamic: Good;-  Standing - Static: Fair;+ (with RW)  Standing - Dynamic: Fair;+ (with RW)  Comments: Stands with a forward flexed posture, patient able to stand at sink and wash hands and comb hair without LOB  Exercises  Heelslides: supine x 10 on R  Knee Active Range of Motion: 0-90 deg in supine  Ankle Pumps: x 10 bilat LEs   AROM RLE (degrees)  RLE General AROM: 0-90 degrees of knee ROM, ankle ROM WFL                     Assessment   Body structures, Functions, Activity limitations: Decreased functional mobility ; Decreased ROM; Decreased strength;Decreased endurance;Decreased balance;Decreased coordination  Assessment: Pt is s/p R TKA. She is progressing with LE endurance and gait mechanics. AROM continues to be maintained at 90 deg flexion. Pt continuesto be fatigued after short bouts of amb, however better when using O2. She also requires VCs for sequencing and safety.  Pt still

## 2018-10-29 LAB
ALBUMIN SERPL-MCNC: 2.7 G/DL (ref 3.1–4.9)
ALPHA-1-GLOBULIN: 0.4 G/DL (ref 0.2–0.4)
ALPHA-2-GLOBULIN: 0.7 G/DL (ref 0.4–1.1)
BETA GLOBULIN: 0.8 G/DL (ref 0.9–1.6)
GAMMA GLOBULIN: 0.7 G/DL (ref 0.6–1.8)
SPE/IFE INTERPRETATION: NORMAL
TOTAL PROTEIN: 5.3 G/DL (ref 6.4–8.2)
URINE ELECTROPHORESIS INTERP: NORMAL

## 2018-10-29 NOTE — PROGRESS NOTES
Occupational Therapy Discharge Summary    Name: Robyn Smith  : 1940    The pt was evaluated by OT on 10/23/2018 and seen for 3 treatment sessions prior to DC to ECF on 10/28/2018 per MD order. The pt's acute therapy goals were:  Short term goals  Time Frame for Short term goals: STG=LTG  Short term goal 1: bed mobility CGA - GOAL MET 10/27  Short term goal 2: LB dressing with AE as needed supervision - GOAL NOT MET 10/27  Short term goal 3: functional mobility with RW with supervision - GOAL NOT MET 10/27  Short term goal 4: functional ADL transfer with RW with supervision - GOAL NOT MET 10/27  Short term goal 5: grooming in stance at sink with RW with supervision - GOAL MET 10/27  Long term goals  Time Frame for Long term goals : LTG=STG  Long term goal 1: Revised STG 1: Supervision bed mobility   Long term goal 2: Revised STG 2: Mod Independent grooming at sink     Patient met 1 goals during stay. Number of Refusals:0  Number of Holds: 1  During this hospitalization, the patient was educated on:  Patient Education: OT eval, POC, discharge recommendations, WB status, safe transfers and mobility, LB ADLs    DC pt from OT caseload at this time.   Thank you, Elisabeth Avitia, [address]

## 2018-11-05 ENCOUNTER — OFFICE VISIT (OUTPATIENT)
Dept: ORTHOPEDIC SURGERY | Age: 78
End: 2018-11-05

## 2018-11-05 VITALS
DIASTOLIC BLOOD PRESSURE: 76 MMHG | BODY MASS INDEX: 27.46 KG/M2 | WEIGHT: 155 LBS | SYSTOLIC BLOOD PRESSURE: 134 MMHG | HEART RATE: 82 BPM | HEIGHT: 63 IN

## 2018-11-05 DIAGNOSIS — M17.11 PRIMARY OSTEOARTHRITIS OF RIGHT KNEE: Primary | ICD-10-CM

## 2018-11-05 PROCEDURE — 99024 POSTOP FOLLOW-UP VISIT: CPT | Performed by: ORTHOPAEDIC SURGERY

## 2018-11-05 NOTE — LETTER
[facility] [address] [address]  Phone: [phone]  Fax: [phone]    No ref. provider found        November 5, 2018       Patient: Levi Conway   MR Number: [id number]   YOB: 1940   Date of Visit: 11/5/2018       Dear Dr. Luis Fernando Bonilla ref. provider found: Thank you for the request for consultation for [employer] to me for the evaluation of Right knee replacement. Below are the relevant portions of my assessment and plan of care. If you have questions, please do not hesitate to call me. I look forward to following Dell Burton along with you.     Sincerely,        Sheila Claude, MD    CC providers:  Carlos Mendez  [address] [place] [address] Avenue: [phone]

## 2018-11-12 ENCOUNTER — CARE COORDINATION (OUTPATIENT)
Dept: CASE MANAGEMENT | Age: 78
End: 2018-11-12

## 2018-11-20 ENCOUNTER — CARE COORDINATION (OUTPATIENT)
Dept: CASE MANAGEMENT | Age: 78
End: 2018-11-20

## 2018-11-20 NOTE — CARE COORDINATION
Name: Melida Nelson  : 1940  MRN: [id number]      Spoke with LPN from [facility] for status update. Incision status: Incision is healing well, no redness, bleeding or drainage.  Swelling and bruising are healing and resolving as expected    Pain control:Continues to require narcotics to control pain    Therapies involved: PT/OT Monday through Friday    Tolerating: well is getting up with assist x 1    Barriers to being discharged home: none    Projected discharge date: not determined at this time    Discharge needs: stability with balance, mobility and ROM      Jayne Greene MSN, MA, [facility] Transition Coordinator  [phone]

## 2018-11-26 ENCOUNTER — OFFICE VISIT (OUTPATIENT)
Dept: ORTHOPEDIC SURGERY | Age: 78
End: 2018-11-26

## 2018-11-26 VITALS
WEIGHT: 155 LBS | BODY MASS INDEX: 27.46 KG/M2 | DIASTOLIC BLOOD PRESSURE: 75 MMHG | SYSTOLIC BLOOD PRESSURE: 126 MMHG | HEART RATE: 71 BPM | HEIGHT: 63 IN

## 2018-11-26 DIAGNOSIS — M17.11 PRIMARY OSTEOARTHRITIS OF RIGHT KNEE: Primary | ICD-10-CM

## 2018-11-26 PROCEDURE — 99024 POSTOP FOLLOW-UP VISIT: CPT | Performed by: PHYSICIAN ASSISTANT

## 2018-12-11 ENCOUNTER — CARE COORDINATION (OUTPATIENT)
Dept: CASE MANAGEMENT | Age: 78
End: 2018-12-11

## 2018-12-28 ENCOUNTER — OFFICE VISIT (OUTPATIENT)
Dept: ORTHOPEDIC SURGERY | Age: 78
End: 2018-12-28

## 2018-12-28 VITALS — WEIGHT: 138 LBS | BODY MASS INDEX: 24.45 KG/M2 | HEIGHT: 63 IN

## 2018-12-28 DIAGNOSIS — M17.11 PRIMARY OSTEOARTHRITIS OF RIGHT KNEE: Primary | ICD-10-CM

## 2018-12-28 PROCEDURE — 99024 POSTOP FOLLOW-UP VISIT: CPT | Performed by: PHYSICIAN ASSISTANT

## 2018-12-28 RX ORDER — POTASSIUM CHLORIDE 1500 MG/1
20 TABLET, FILM COATED, EXTENDED RELEASE ORAL 2 TIMES DAILY WITH MEALS
Status: ON HOLD | COMMUNITY
End: 2019-06-21 | Stop reason: HOSPADM

## 2018-12-28 RX ORDER — OXYCODONE HYDROCHLORIDE AND ACETAMINOPHEN 5; 325 MG/1; MG/1
1 TABLET ORAL EVERY 6 HOURS PRN
COMMUNITY
End: 2019-05-10 | Stop reason: DRUGHIGH

## 2018-12-29 ENCOUNTER — CARE COORDINATION (OUTPATIENT)
Dept: CASE MANAGEMENT | Age: 78
End: 2018-12-29

## 2018-12-29 NOTE — CARE COORDINATION
Name: Jovita Khalil  : 1940  MRN: [id number]      Spoke with LPN from [facility] for status update. Niraj Guzman is a permanent resident at [facility]    Incision status: 9 weeks post op, Incision has healed well, no redness, bleeding or drainage. Swelling and bruising are healing and resolving as expected    Pain control: Pain is well controlled    States she has completed PT/OT and is walking without walker or cane indoors at her facility    She was seen by her surgeon 2018 and is released from his care, will return in 1-2 years for follow up assessment and imaging and was instructed to have antibiotic prophylaxis before dental cleaning, colonoscopy or other invasive procedures for the next year.       Next call will be final call for [facility] transition    Bry GOINS, MA, [address] [facility] Transition Coordinator  [phone]

## 2019-01-21 ENCOUNTER — CARE COORDINATION (OUTPATIENT)
Dept: CASE MANAGEMENT | Age: 79
End: 2019-01-21

## 2019-02-11 ASSESSMENT — KOOS JR
STRAIGHTENING KNEE FULLY: 3
STANDING UPRIGHT: 4
TWISING OR PIVOTING ON KNEE: 3
GOING UP OR DOWN STAIRS: 4
BENDING TO THE FLOOR TO PICK UP OBJECT: 4
HOW SEVERE IS YOUR KNEE STIFFNESS AFTER FIRST WAKING IN MORNING: 3
RISING FROM SITTING: 3

## 2019-04-02 ENCOUNTER — HOSPITAL ENCOUNTER (OUTPATIENT)
Dept: NON INVASIVE DIAGNOSTICS | Age: 79
Discharge: HOME | End: 2019-04-02
Payer: MEDICARE

## 2019-04-02 ENCOUNTER — HOSPITAL ENCOUNTER (OUTPATIENT)
Age: 79
Discharge: HOME | End: 2019-04-02
Payer: MEDICARE

## 2019-04-02 LAB
A/G RATIO: 1.4 (ref 1.1–2.2)
ALBUMIN SERPL-MCNC: 4.6 G/DL (ref 3.4–5)
ALP BLD-CCNC: 66 U/L (ref 40–129)
ALT SERPL-CCNC: 9 U/L (ref 10–40)
ANION GAP SERPL CALCULATED.3IONS-SCNC: 16 MMOL/L (ref 3–16)
AST SERPL-CCNC: 17 U/L (ref 15–37)
BILIRUB SERPL-MCNC: 0.4 MG/DL (ref 0–1)
BUN BLDV-MCNC: 16 MG/DL (ref 7–20)
CALCIUM SERPL-MCNC: 9.8 MG/DL (ref 8.3–10.6)
CHLORIDE BLD-SCNC: 101 MMOL/L (ref 99–110)
CO2: 22 MMOL/L (ref 21–32)
CREAT SERPL-MCNC: 0.7 MG/DL (ref 0.6–1.2)
GFR AFRICAN AMERICAN: >60
GFR NON-AFRICAN AMERICAN: >60
GLOBULIN: 3.3 G/DL
GLUCOSE BLD-MCNC: 123 MG/DL (ref 70–99)
HCT VFR BLD CALC: 39.2 % (ref 36–48)
HEMATOLOGY PATH CONSULT: NO
HEMOGLOBIN: 12.9 G/DL (ref 12–16)
LEFT VENTRICULAR EJECTION FRACTION HIGH VALUE: 60 %
LEFT VENTRICULAR EJECTION FRACTION MODE: NORMAL
LV EF: 55 %
LV EF: 58 %
LVEF MODALITY: NORMAL
MAGNESIUM: 1.6 MG/DL (ref 1.8–2.4)
MCH RBC QN AUTO: 36.4 PG (ref 26–34)
MCHC RBC AUTO-ENTMCNC: 32.9 G/DL (ref 31–36)
MCV RBC AUTO: 110.4 FL (ref 80–100)
PDW BLD-RTO: 16.1 % (ref 12.4–15.4)
PLATELET # BLD: 189 K/UL (ref 135–450)
PMV BLD AUTO: 8.4 FL (ref 5–10.5)
POTASSIUM SERPL-SCNC: 5.2 MMOL/L (ref 3.5–5.1)
RBC # BLD: 3.55 M/UL (ref 4–5.2)
SODIUM BLD-SCNC: 139 MMOL/L (ref 136–145)
TOTAL PROTEIN: 7.9 G/DL (ref 6.4–8.2)
TSH SERPL DL<=0.05 MIU/L-ACNC: 0.7 UIU/ML (ref 0.27–4.2)
WBC # BLD: 5 K/UL (ref 4–11)

## 2019-04-02 PROCEDURE — 93306 TTE W/DOPPLER COMPLETE: CPT

## 2019-04-02 PROCEDURE — 36415 COLL VENOUS BLD VENIPUNCTURE: CPT

## 2019-04-02 PROCEDURE — 93005 ELECTROCARDIOGRAM TRACING: CPT | Performed by: FAMILY MEDICINE

## 2019-04-02 PROCEDURE — 85027 COMPLETE CBC AUTOMATED: CPT

## 2019-04-02 PROCEDURE — 93010 ELECTROCARDIOGRAM REPORT: CPT | Performed by: INTERNAL MEDICINE

## 2019-04-02 PROCEDURE — 80053 COMPREHEN METABOLIC PANEL: CPT

## 2019-04-02 PROCEDURE — 84443 ASSAY THYROID STIM HORMONE: CPT

## 2019-04-02 PROCEDURE — 83735 ASSAY OF MAGNESIUM: CPT

## 2019-04-03 LAB
EKG ATRIAL RATE: 86 BPM
EKG DIAGNOSIS: NORMAL
EKG P AXIS: 61 DEGREES
EKG P-R INTERVAL: 108 MS
EKG Q-T INTERVAL: 370 MS
EKG QRS DURATION: 82 MS
EKG QTC CALCULATION (BAZETT): 442 MS
EKG R AXIS: -61 DEGREES
EKG T AXIS: 55 DEGREES
EKG VENTRICULAR RATE: 86 BPM

## 2019-04-21 ENCOUNTER — HOSPITAL ENCOUNTER (EMERGENCY)
Age: 79
Discharge: HOME | End: 2019-04-21
Attending: EMERGENCY MEDICINE
Payer: MEDICARE

## 2019-04-21 ENCOUNTER — APPOINTMENT (OUTPATIENT)
Dept: CT IMAGING | Age: 79
End: 2019-04-21
Payer: MEDICARE

## 2019-04-21 VITALS
HEIGHT: 62 IN | BODY MASS INDEX: 25.4 KG/M2 | RESPIRATION RATE: 16 BRPM | TEMPERATURE: 98.2 F | DIASTOLIC BLOOD PRESSURE: 68 MMHG | OXYGEN SATURATION: 92 % | HEART RATE: 75 BPM | SYSTOLIC BLOOD PRESSURE: 147 MMHG | WEIGHT: 138 LBS

## 2019-04-21 DIAGNOSIS — N39.0 URINARY TRACT INFECTION IN FEMALE: Primary | ICD-10-CM

## 2019-04-21 LAB
A/G RATIO: 1.5 (ref 1.1–2.2)
ALBUMIN SERPL-MCNC: 4.3 G/DL (ref 3.4–5)
ALP BLD-CCNC: 59 U/L (ref 40–129)
ALT SERPL-CCNC: 9 U/L (ref 10–40)
ANION GAP SERPL CALCULATED.3IONS-SCNC: 12 MMOL/L (ref 3–16)
AST SERPL-CCNC: 16 U/L (ref 15–37)
BACTERIA: ABNORMAL /HPF
BASOPHILS ABSOLUTE: 0 K/UL (ref 0–0.2)
BASOPHILS RELATIVE PERCENT: 0.7 %
BILIRUB SERPL-MCNC: 0.4 MG/DL (ref 0–1)
BILIRUBIN URINE: NEGATIVE
BLOOD, URINE: NEGATIVE
BUN BLDV-MCNC: 23 MG/DL (ref 7–20)
CALCIUM SERPL-MCNC: 8.7 MG/DL (ref 8.3–10.6)
CASTS: ABNORMAL /LPF
CHLORIDE BLD-SCNC: 104 MMOL/L (ref 99–110)
CLARITY: ABNORMAL
CO2: 20 MMOL/L (ref 21–32)
COLOR: YELLOW
CREAT SERPL-MCNC: 0.7 MG/DL (ref 0.6–1.2)
EOSINOPHILS ABSOLUTE: 0.1 K/UL (ref 0–0.6)
EOSINOPHILS RELATIVE PERCENT: 2.4 %
EPITHELIAL CELLS, UA: 7 /HPF (ref 0–5)
GFR AFRICAN AMERICAN: >60
GFR NON-AFRICAN AMERICAN: >60
GLOBULIN: 2.9 G/DL
GLUCOSE BLD-MCNC: 103 MG/DL (ref 70–99)
GLUCOSE URINE: NEGATIVE MG/DL
HCT VFR BLD CALC: 37.9 % (ref 36–48)
HEMATOLOGY PATH CONSULT: NO
HEMOGLOBIN: 12.5 G/DL (ref 12–16)
KETONES, URINE: NEGATIVE MG/DL
LEUKOCYTE ESTERASE, URINE: NEGATIVE
LYMPHOCYTES ABSOLUTE: 2.2 K/UL (ref 1–5.1)
LYMPHOCYTES RELATIVE PERCENT: 37.1 %
MCH RBC QN AUTO: 36.8 PG (ref 26–34)
MCHC RBC AUTO-ENTMCNC: 32.9 G/DL (ref 31–36)
MCV RBC AUTO: 111.8 FL (ref 80–100)
MICROSCOPIC EXAMINATION: YES
MONOCYTES ABSOLUTE: 0.5 K/UL (ref 0–1.3)
MONOCYTES RELATIVE PERCENT: 8.4 %
NEUTROPHILS ABSOLUTE: 3 K/UL (ref 1.7–7.7)
NEUTROPHILS RELATIVE PERCENT: 51.4 %
NITRITE, URINE: NEGATIVE
PDW BLD-RTO: 15.5 % (ref 12.4–15.4)
PH UA: 6 (ref 5–8)
PLATELET # BLD: 174 K/UL (ref 135–450)
PMV BLD AUTO: 7.7 FL (ref 5–10.5)
POTASSIUM REFLEX MAGNESIUM: 5.2 MMOL/L (ref 3.5–5.1)
PROTEIN UA: NEGATIVE MG/DL
RBC # BLD: 3.39 M/UL (ref 4–5.2)
RBC UA: ABNORMAL /HPF (ref 0–2)
SODIUM BLD-SCNC: 136 MMOL/L (ref 136–145)
SPECIFIC GRAVITY UA: 1.03 (ref 1–1.03)
TOTAL PROTEIN: 7.2 G/DL (ref 6.4–8.2)
URINE REFLEX TO CULTURE: YES
URINE TYPE: ABNORMAL
UROBILINOGEN, URINE: 0.2 E.U./DL
WBC # BLD: 5.9 K/UL (ref 4–11)
WBC UA: 7 /HPF (ref 0–5)

## 2019-04-21 PROCEDURE — 99284 EMERGENCY DEPT VISIT MOD MDM: CPT

## 2019-04-21 PROCEDURE — 6360000004 HC RX CONTRAST MEDICATION: Performed by: EMERGENCY MEDICINE

## 2019-04-21 PROCEDURE — 96374 THER/PROPH/DIAG INJ IV PUSH: CPT

## 2019-04-21 PROCEDURE — 6360000002 HC RX W HCPCS: Performed by: PHYSICIAN ASSISTANT

## 2019-04-21 PROCEDURE — 85025 COMPLETE CBC W/AUTO DIFF WBC: CPT

## 2019-04-21 PROCEDURE — 2580000003 HC RX 258

## 2019-04-21 PROCEDURE — 81001 URINALYSIS AUTO W/SCOPE: CPT

## 2019-04-21 PROCEDURE — 74177 CT ABD & PELVIS W/CONTRAST: CPT

## 2019-04-21 PROCEDURE — 80053 COMPREHEN METABOLIC PANEL: CPT

## 2019-04-21 PROCEDURE — 6370000000 HC RX 637 (ALT 250 FOR IP): Performed by: PHYSICIAN ASSISTANT

## 2019-04-21 PROCEDURE — 96361 HYDRATE IV INFUSION ADD-ON: CPT

## 2019-04-21 PROCEDURE — 87086 URINE CULTURE/COLONY COUNT: CPT

## 2019-04-21 RX ORDER — OXYCODONE HYDROCHLORIDE AND ACETAMINOPHEN 5; 325 MG/1; MG/1
1 TABLET ORAL ONCE
Status: COMPLETED | OUTPATIENT
Start: 2019-04-21 | End: 2019-04-21

## 2019-04-21 RX ORDER — 0.9 % SODIUM CHLORIDE 0.9 %
1000 INTRAVENOUS SOLUTION INTRAVENOUS ONCE
Status: COMPLETED | OUTPATIENT
Start: 2019-04-21 | End: 2019-04-21

## 2019-04-21 RX ORDER — SODIUM CHLORIDE 9 MG/ML
INJECTION, SOLUTION INTRAVENOUS
Status: COMPLETED
Start: 2019-04-21 | End: 2019-04-21

## 2019-04-21 RX ORDER — ONDANSETRON 2 MG/ML
4 INJECTION INTRAMUSCULAR; INTRAVENOUS ONCE
Status: COMPLETED | OUTPATIENT
Start: 2019-04-21 | End: 2019-04-21

## 2019-04-21 RX ORDER — CEPHALEXIN 500 MG/1
500 CAPSULE ORAL 2 TIMES DAILY
Qty: 20 CAPSULE | Refills: 0 | Status: SHIPPED | OUTPATIENT
Start: 2019-04-21 | End: 2019-05-01

## 2019-04-21 RX ADMIN — OXYCODONE HYDROCHLORIDE AND ACETAMINOPHEN 1 TABLET: 5; 325 TABLET ORAL at 18:20

## 2019-04-21 RX ADMIN — SODIUM CHLORIDE 1000 ML: 9 INJECTION, SOLUTION INTRAVENOUS at 16:56

## 2019-04-21 RX ADMIN — IOPAMIDOL 75 ML: 755 INJECTION, SOLUTION INTRAVENOUS at 18:02

## 2019-04-21 RX ADMIN — ONDANSETRON 4 MG: 2 INJECTION INTRAMUSCULAR; INTRAVENOUS at 16:56

## 2019-04-21 RX ADMIN — Medication 1000 ML: at 16:56

## 2019-04-21 ASSESSMENT — ENCOUNTER SYMPTOMS
ABDOMINAL PAIN: 1
NAUSEA: 1
WHEEZING: 0
DIARRHEA: 0
VOMITING: 0
COLOR CHANGE: 0
BACK PAIN: 0
SHORTNESS OF BREATH: 0

## 2019-04-21 ASSESSMENT — PAIN SCALES - GENERAL: PAINLEVEL_OUTOF10: 6

## 2019-04-21 NOTE — ED PROVIDER NOTES
for arthralgias, back pain, gait problem, joint swelling, myalgias, neck pain and neck stiffness. Skin: Negative for color change, pallor, rash and wound. Neurological: Negative for dizziness, syncope, light-headedness and headaches. Positives and Pertinent negatives as per HPI. Except as noted abovein the ROS, all other systems were reviewed and negative.        PAST MEDICAL HISTORY     Past Medical History:   Diagnosis Date    Abdominal aneurysm ([address]     Anemia     Anesthesia complication     pt got confused after anes    Aortic aneurysm ([address]     unspecified site    Arthritis     At risk for falls 09/11/2018    per pt and pt's son subjective reports     Ataxia     Avascular necrosis ([address]     bilateral shoulders    Back pain     Bipolar 1 disorder ([address]     Blood transfusion 2003, 2009    during knee and hip replacement    CAD (coronary artery disease)     Chronic kidney disease     multiple calcium kidney stones 10 years ago    Clostridium difficile infection 08/2011; 1/21/13 4/29/14; 6/25/14    Colitis due to Clostridium difficile 4-    Confusion 5/18/2012    COPD (chronic obstructive pulmonary disease) ([address]     Dementia     Depression     Diabetes ([address]     Diabetes mellitus ([address]     Diarrhea     Dysuria     Fracture of right acetabulum (HCC)     posterior column    GERD (gastroesophageal reflux disease)     Gout     H/O migraine     Hyperlipidemia     Hypertension     Liver disease     Major depressive disorder     Neuropathy     Other disorders of kidney and ureter     kidney stones    Pneumonia     2009    Prolonged emergence from general anesthesia     Shoulder (girdle) dystocia during labor and deliver, delivered     Type II or unspecified type diabetes mellitus without mention of complication, not stated as uncontrolled     UTI (urinary tract infection)     Vitamin D deficiency     Weakness     Wrist fracture     right         SURGICAL HISTORY     Past Surgical History:   Procedure Laterality Date    ABDOMEN SURGERY      ABDOMINAL AORTIC ANEURYSM REPAIR  8/5/2013    aorto bifemoral bypass    APPENDECTOMY  1960    BACK SURGERY      CAROTID ENDARTERECTOMY  5/16/11    right    CHOLECYSTECTOMY      COLONOSCOPY      diverticulitis; bowel surgery    ENDOSCOPY, COLON, DIAGNOSTIC      egd-dilated esoughagous    ERCP  2-1-2013    ercp with stent placement    EYE SURGERY      cataract b/l    FRACTURE SURGERY      left elbow 1992    HYSTERECTOMY      JOINT REPLACEMENT      rt hip and lt knee    JOINT REPLACEMENT Left 09/28/2016    sholuder    KIDNEY STONE SURGERY      removed abcess lt kidney and stone    SD TOTAL KNEE ARTHROPLASTY Right 10/23/2018    RIGHT TOTAL KNEE ARTHROPLASTY - SYED STANDARD performed by Leta Ruiz MD at [address] Right 05/02/2017    R reverse TSA with bone grafting of complex glenoid deficiency with biceps tenodesis    TONSILLECTOMY      as child    UPPER GASTROINTESTINAL ENDOSCOPY  4-28-14    VASCULAR SURGERY      WRIST SURGERY Right 2/12/16    ORIF right distal radius         CURRENTMEDICATIONS       Previous Medications    ALENDRONATE (FOSAMAX) 70 MG TABLET    Take 1 tablet by mouth every 7 days    ALLOPURINOL (ZYLOPRIM) 300 MG TABLET    TAKE 1 TABLET BY MOUTH ONCE DAILY    ALUMINUM & MAGNESIUM HYDROXIDE-SIMETHICONE (MAALOX) 200-200-20 MG/5ML SUSP SUSPENSION    Take 30 mLs by mouth every 4 hours as needed for Indigestion    ASPIRIN 325 MG EC TABLET    Take 1 tablet by mouth 2 times daily for 14 days    ATORVASTATIN (LIPITOR) 20 MG TABLET    Take 10 mg by mouth daily     CALCIUM CARBONATE (OSCAL) 500 MG TABS TABLET    Take 250 mg by mouth 2 times daily    DIPHENHYDRAMINE HCL (BENADRYL PO)    Take 1 tablet by mouth every 8 hours as needed itching    DIVALPROEX (DEPAKOTE) 125 MG DR TABLET    Take 125 mg by mouth 2 times daily    DOCUSATE SODIUM (COLACE) 100 MG CAPSULE    Take 100 mg Years of education: 15    Highest education level: None   Occupational History    None   Social Needs    Financial resource strain: None    Food insecurity:     Worry: None     Inability: None    Transportation needs:     Medical: None     Non-medical: None   Tobacco Use    Smoking status: Former Smoker     Packs/day: 0.10     Years: 50.00     Pack years: 5.00     Types: Cigarettes     Last attempt to quit: 12/16/2015     Years since quitting: 3.3    Smokeless tobacco: Never Used   Substance and Sexual Activity    Alcohol use: No     Alcohol/week: 0.0 oz     Comment: rare    Drug use: No    Sexual activity: Not Currently   Lifestyle    Physical activity:     Days per week: None     Minutes per session: None    Stress: None   Relationships    Social connections:     Talks on phone: None     Gets together: None     Attends [employer] service: None     Active member of club or organization: None     Attends meetings of clubs or organizations: None     Relationship status: None    Intimate partner violence:     Fear of current or ex partner: None     Emotionally abused: None     Physically abused: None     Forced sexual activity: None   Other Topics Concern    None   Social History Narrative    None       SCREENINGS    [place] Coma Scale  Eye Opening: Spontaneous  Best Verbal Response: Oriented  Best Motor Response: Obeys commands  [place] Coma Scale Score: 15        PHYSICAL EXAM    (up to 7 for level 4, 8 or more for level 5)     ED Triage Vitals   BP Temp Temp src Pulse Resp SpO2 Height Weight   -- -- -- -- -- -- -- --       Physical Exam   Constitutional: She is oriented to person, place, and time. She appears well-developed and well-nourished. HENT:   Head: Normocephalic and atraumatic. Nose: Nose normal.   Mouth/Throat: Oropharynx is clear and moist.   Eyes: Conjunctivae and EOM are normal. Right eye exhibits no discharge. Left eye exhibits no discharge. Neck: Normal range of motion.  Neck supple. Cardiovascular: Normal rate, regular rhythm and normal heart sounds. Exam reveals no gallop. No murmur heard. Pulmonary/Chest: Effort normal and breath sounds normal. No respiratory distress. She has no wheezes. She has no rales. Abdominal: Soft. There is tenderness in the left lower quadrant. There is no rebound, no guarding, no CVA tenderness, no tenderness at McBurney's point and negative Huff's sign. Musculoskeletal: Normal range of motion. Lumbar back: She exhibits tenderness. She exhibits normal range of motion, no bony tenderness, no swelling and no edema. Back:    Neurological: She is alert and oriented to person, place, and time. Skin: Skin is warm and dry. She is not diaphoretic. No pallor. Psychiatric: She has a normal mood and affect. Her behavior is normal.   Nursing note and vitals reviewed.       DIAGNOSTIC RESULTS   LABS:    Labs Reviewed   URINE RT REFLEX TO CULTURE - Abnormal; Notable for the following components:       Result Value    Clarity, UA CLOUDY (*)     All other components within normal limits    Narrative:     Performed at:  [facility] [address], [place], [address]   Phone [phone]   CBC WITH AUTO DIFFERENTIAL - Abnormal; Notable for the following components:    RBC 3.39 (*)     .8 (*)     MCH 36.8 (*)     RDW 15.5 (*)     All other components within normal limits    Narrative:     Performed at:  [facility] [address], [place], [address] [employer]   Phone [phone]   COMPREHENSIVE METABOLIC PANEL W/ REFLEX TO MG FOR LOW K - Abnormal; Notable for the following components:    Potassium reflex Magnesium 5.2 (*)     CO2 20 (*)     Glucose 103 (*)     BUN 23 (*)     ALT 9 (*)     All other components within normal limits    Narrative:     Performed at:  [facility] [address], [place], [facility] [employer]   Phone [phone] MICROSCOPIC URINALYSIS - Abnormal; Notable for the following components:    Casts 3-5 Hyaline (*)     Bacteria, UA 2+ (*)     WBC, UA 7 (*)     Epi Cells 7 (*)     All other components within normal limits    Narrative:     Performed at:  [facility] [address] [address], [address]   Phone [phone]   URINE CULTURE       All other labs were within normal range or not returned as of this dictation. EKG: All EKG's are interpreted by the Emergency Department Physician who either signs orCo-signs this chart in the absence of a cardiologist.  Please see their note for interpretation of EKG. RADIOLOGY:   Non-plain film images such as CT, Ultrasound and MRI are read by the radiologist. Plain radiographic images are visualized andpreliminarily interpreted by the  ED Provider with the below findings:        Interpretation perthe Radiologist below, if available at the time of this note:    CT ABDOMEN PELVIS W IV CONTRAST Additional Contrast? None   Final Result   1. AAA measuring 3.9 cm requires follow-up imaging annually**. 2.  Calcific atherosclerotic disease aorta. 3.  Mild intra and extrahepatic bile duct dilatation status post   cholecystectomy typical of reservoir effect. 4.  Diverticulosis coli without CT evidence of acute diverticulitis. 5.  No CT evidence of an acute intra-abdominal or intrapelvic process. 6. Degenerative changes lumbar spine with grade 1 degenerative   anterolisthesis L4 on L5.   ______________________________________________________________________________   ____      **Managing Abdominal Aortic Aneurysms      2.6-2.9 cm: Every 5 years*      3.0-3.4 cm: Every 3 years. 3.5-3.9 cm: Every 1 year. 4.0-4.4 cm: Every 1 year. Recommend vascular consultation. 4.5-5.4 cm: Every 6 months. Recommend vascular consultation. Greater than or equal to 5.5 cm: Referral to vascular surgeon.       *For abdominal aortas with maximum diameter of 2.6-2.9 cm meeting criteria   for AAA (>50% of proximal normal segment). Reference:      J Vasc Surg. 2009 Oct;50(4 Suppl):S2-49      Report           No results found. PROCEDURES   Unless otherwise noted below, none     Procedures    CRITICAL CARE TIME   N/A    CONSULTS:  None      EMERGENCY DEPARTMENT COURSE and DIFFERENTIALDIAGNOSIS/MDM:   Vitals:    Vitals:    04/21/19 1653 04/21/19 1655 04/21/19 1822   BP: (!) 145/77 (!) 145/77 (!) 147/68   Pulse: 75     Resp: 16     Temp: 98.2 °F (36.8 °C)     TempSrc: Oral     SpO2:  95% 92%   Weight: 138 lb (62.6 kg)     Height: 5' 2\" (1.575 m)         Patient was given thefollowing medications:  Medications   0.9 % sodium chloride bolus (0 mLs Intravenous Stopped 4/21/19 1756)   ondansetron (ZOFRAN) injection 4 mg (4 mg Intravenous Given 4/21/19 1656)   oxyCODONE-acetaminophen (PERCOCET) 5-325 MG per tablet 1 tablet (1 tablet Oral Given 4/21/19 1820)   iopamidol (ISOVUE-370) 76 % injection 75 mL (75 mLs Intravenous Given 4/21/19 1802)       Patient presents the emergency department for evaluation of left flank and left lower abdominal pain with urination that started today. Patient has a history of kidney stones. On evaluation patient is alert and oriented in no acute distress. Patient has some lower left quadrant with corresponding left flank pain with palpation of her abdomen. Lungs are clear to auscultation bilaterally. Heart rate is regular. Laboratory evaluation is significant for evidence of cystitis on urinalysis. CT shows no evidence of pyelonephritis or renal lithiasis. Patient will be treated with Keflex outpatient. Patient has a penicillin allergy which gives her hives. Patient has never had an allergic reaction to an antibiotic that caused difficulty breathing or swallowing. Patient is amenable to antibiotic treatment outpatient. Patient will follow up with her primary care physician as necessary.  Patient feels improved after fluids and

## 2019-04-21 NOTE — ED PROVIDER NOTES
I independently performed a history and physical on Ajit Wagoner. All diagnostic, treatment, and disposition decisions were made by myself in conjunction with the advanced practice provider. Briefly, this is a 70 y.o. female here for left-sided abdominal pain. The pain came on suddenly today around noon. The pain is localized left-sided and radiates to the left flank. It is severe and constant. She also has dysuria. .    On exam, the patient is tender in left lower quadrant and left flank with guarding, but no rebound. She appears uncomfortable, but nontoxic. Breathing is unlabored. Speech is clear. Screenings   [place] Coma Scale  Eye Opening: Spontaneous  Best Verbal Response: Oriented  Best Motor Response: Obeys commands  Len Coma Scale Score: 15          Patient Referrals:  Nam Talamantes  [address] [facility] [address] [phone]          [facility] Emergency Department  [address]  [address] [facility] [address] [phone]    If symptoms worsen      Discharge Medications:  New Prescriptions    CEPHALEXIN (KEFLEX) 500 MG CAPSULE    Take 1 capsule by mouth 2 times daily for 10 days       FINAL IMPRESSION  1. Urinary tract infection in female        Blood pressure (!) 147/68, pulse 75, temperature 98.2 °F (36.8 °C), temperature source Oral, resp. rate 16, height 5' 2\" (1.575 m), weight 138 lb (62.6 kg), SpO2 92 %, not currently breastfeeding.      For further details of [facility] emergency department encounter, please see documentation by advanced practice provider  CARMELO Leslie.        Natalee Holland MD  05/09/19 5149

## 2019-04-21 NOTE — ED NOTES
ED ACUTE CARE RN EVALUATION:  >Pt evaluation initiated in Acute Care area by RN and ERMD.  > All orders placed by ED provider team based on pt POC r/t chief complaint and initial assessment are implemented upon my acknowledgement in order history. >See eMAR for administration of medications as ordered by provider team.  >See Doc Flowsheet for initial physical assessment and continued monitoring based on patient chief complaint and associated symptoms. >Any further NOTES made by me on this pt's visit will only be done when there is a deviation of the patient's baseline acuity, expected outcome or from any normal nursing practice or hospital protocol. Pt into ED with son from SNF. Pt states she started having right lower abdominal pain along wih right flank pain that has increased today. Pt states she has a HX of kidney stones and she thinks this is what this is.       Ric Garza RN  04/21/19 8818

## 2019-04-23 LAB — URINE CULTURE, ROUTINE: NORMAL

## 2019-05-10 ENCOUNTER — APPOINTMENT (OUTPATIENT)
Dept: CT IMAGING | Age: 79
End: 2019-05-10
Payer: MEDICARE

## 2019-05-10 ENCOUNTER — HOSPITAL ENCOUNTER (EMERGENCY)
Age: 79
Discharge: NURSING, REHAB OR LONG-TERM CARE | End: 2019-05-10
Payer: MEDICARE

## 2019-05-10 VITALS
RESPIRATION RATE: 16 BRPM | BODY MASS INDEX: 25.76 KG/M2 | OXYGEN SATURATION: 94 % | HEART RATE: 86 BPM | DIASTOLIC BLOOD PRESSURE: 78 MMHG | WEIGHT: 140 LBS | HEIGHT: 62 IN | TEMPERATURE: 97.1 F | SYSTOLIC BLOOD PRESSURE: 139 MMHG

## 2019-05-10 DIAGNOSIS — Z91.81 STATUS POST FALL: Primary | ICD-10-CM

## 2019-05-10 DIAGNOSIS — S02.2XXB OPEN DISPLACED FRACTURE OF NASAL BONE, INITIAL ENCOUNTER: ICD-10-CM

## 2019-05-10 DIAGNOSIS — M50.30 DDD (DEGENERATIVE DISC DISEASE), CERVICAL: ICD-10-CM

## 2019-05-10 DIAGNOSIS — S16.1XXA CERVICAL STRAIN, ACUTE, INITIAL ENCOUNTER: ICD-10-CM

## 2019-05-10 PROCEDURE — 72125 CT NECK SPINE W/O DYE: CPT

## 2019-05-10 PROCEDURE — 70450 CT HEAD/BRAIN W/O DYE: CPT

## 2019-05-10 PROCEDURE — 4500000021 HC ED LEVEL 1 PROCEDURE

## 2019-05-10 PROCEDURE — 2500000003 HC RX 250 WO HCPCS

## 2019-05-10 PROCEDURE — 99284 EMERGENCY DEPT VISIT MOD MDM: CPT

## 2019-05-10 PROCEDURE — 70486 CT MAXILLOFACIAL W/O DYE: CPT

## 2019-05-10 PROCEDURE — 6370000000 HC RX 637 (ALT 250 FOR IP): Performed by: PHYSICIAN ASSISTANT

## 2019-05-10 RX ORDER — LIDOCAINE HYDROCHLORIDE 10 MG/ML
INJECTION, SOLUTION EPIDURAL; INFILTRATION; INTRACAUDAL; PERINEURAL
Status: COMPLETED
Start: 2019-05-10 | End: 2019-05-10

## 2019-05-10 RX ORDER — ACETAMINOPHEN 325 MG/1
650 TABLET ORAL EVERY 6 HOURS PRN
COMMUNITY

## 2019-05-10 RX ORDER — MAGNESIUM OXIDE 400 MG/1
400 TABLET ORAL DAILY
COMMUNITY

## 2019-05-10 RX ORDER — ASPIRIN 81 MG/1
81 TABLET, CHEWABLE ORAL DAILY
COMMUNITY

## 2019-05-10 RX ORDER — HYDROCODONE BITARTRATE AND ACETAMINOPHEN 5; 325 MG/1; MG/1
1 TABLET ORAL ONCE
Status: COMPLETED | OUTPATIENT
Start: 2019-05-10 | End: 2019-05-10

## 2019-05-10 RX ORDER — CEPHALEXIN 500 MG/1
500 CAPSULE ORAL 4 TIMES DAILY
Qty: 40 CAPSULE | Refills: 0 | Status: SHIPPED | OUTPATIENT
Start: 2019-05-10 | End: 2019-05-20

## 2019-05-10 RX ORDER — OXYCODONE HYDROCHLORIDE AND ACETAMINOPHEN 5; 325 MG/1; MG/1
1 TABLET ORAL EVERY 4 HOURS PRN
Qty: 15 TABLET | Refills: 0 | Status: SHIPPED | OUTPATIENT
Start: 2019-05-10 | End: 2019-05-12

## 2019-05-10 RX ADMIN — LIDOCAINE HYDROCHLORIDE: 10 INJECTION, SOLUTION EPIDURAL; INFILTRATION; INTRACAUDAL; PERINEURAL at 07:15

## 2019-05-10 RX ADMIN — HYDROCODONE BITARTRATE AND ACETAMINOPHEN 1 TABLET: 5; 325 TABLET ORAL at 06:43

## 2019-05-10 ASSESSMENT — ENCOUNTER SYMPTOMS
COLOR CHANGE: 1
EYE PAIN: 0
ABDOMINAL PAIN: 0
DIARRHEA: 0
TROUBLE SWALLOWING: 0
CHEST TIGHTNESS: 0
FACIAL SWELLING: 1
SHORTNESS OF BREATH: 0
BACK PAIN: 0
VOMITING: 0
SORE THROAT: 0
NAUSEA: 0

## 2019-05-10 ASSESSMENT — PAIN DESCRIPTION - PAIN TYPE: TYPE: ACUTE PAIN

## 2019-05-10 ASSESSMENT — PAIN DESCRIPTION - LOCATION: LOCATION: FACE;NOSE

## 2019-05-10 ASSESSMENT — PAIN SCALES - GENERAL
PAINLEVEL_OUTOF10: 8
PAINLEVEL_OUTOF10: 8

## 2019-05-10 NOTE — ED PROVIDER NOTES
**EVALUATED BY ADVANCED PRACTICE PROVIDER**        [address] ENCOUNTER      Pt Name: Jamal Bustillos  GXZ:[id number]  Darlingfmay 1940  Date of evaluation: 5/10/2019  Provider: Jhony Cho PA-C      Chief Complaint:    Chief Complaint   Patient presents with   Corinna Guzman     [facility]. pt night nose on nightstand after waking from a dream. Denies LOC and not on blood thinners. Nursing Notes, Past Medical Hx, Past Surgical Hx, Social Hx, Allergies, and Family Hx were all reviewed and agreed with or any disagreements were addressed in the HPI.    HPI:  (Location, Duration, Timing, Severity,Quality, Assoc Sx, Context, Modifying factors)  This is a  70 y.o. female presents the emergency department after injuries from a fall. Patient is presenting from [facility]. She states that she was having a bad dream when she reports that she fell out of bed striking her nose on the nightstand. She states she had acute onset of pain and discomfort. She states that she does not believe that she lost consciousness and is not currently anticoagulated. Patient states that she has had a difficult time getting the bleeding under control from a laceration over her nose. She is reported she has significant pain and discomfort in the area that radiated to be 8 out of 10. She states she is currently up-to-date with her tetanus vaccination. She states that she is not currently experiencing significant headache pain. She states she is not experiencing any significant cervical pain but does report that she had a hyperextension injury mechanism of the fall. Patient states she does not have any associated dental injury. She states that she has applied ice in and around the area of the nose and has done nothing other than the above-mentioned.   Patient states she's not currently experiencing chest pain, palpitations, lightheadedness, diverticulitis; bowel surgery    ENDOSCOPY, COLON, DIAGNOSTIC      egd-dilated esoughagous    ERCP  2-1-2013    ercp with stent placement    EYE SURGERY      cataract b/l    FRACTURE SURGERY      left elbow 1992    HYSTERECTOMY      JOINT REPLACEMENT      rt hip and lt knee    JOINT REPLACEMENT Left 09/28/2016    sholuder    KIDNEY STONE SURGERY      removed abcess lt kidney and stone    DE TOTAL KNEE ARTHROPLASTY Right 10/23/2018    RIGHT TOTAL KNEE ARTHROPLASTY - SYED STANDARD performed by Rula Lacy MD at [address] Right 05/02/2017    R reverse TSA with bone grafting of complex glenoid deficiency with biceps tenodesis    TONSILLECTOMY      as child    UPPER GASTROINTESTINAL ENDOSCOPY  4-28-14    VASCULAR SURGERY      WRIST SURGERY Right 2/12/16    ORIF right distal radius       Medications:  Previous Medications    ACETAMINOPHEN (TYLENOL) 325 MG TABLET    Take 650 mg by mouth every 6 hours as needed for Pain    ALENDRONATE (FOSAMAX) 70 MG TABLET    Take 1 tablet by mouth every 7 days    ALLOPURINOL (ZYLOPRIM) 300 MG TABLET    TAKE 1 TABLET BY MOUTH ONCE DAILY    ALUMINUM & MAGNESIUM HYDROXIDE-SIMETHICONE (MAALOX) 200-200-20 MG/5ML SUSP SUSPENSION    Take 30 mLs by mouth every 4 hours as needed for Indigestion    ASPIRIN 325 MG EC TABLET    Take 1 tablet by mouth 2 times daily for 14 days    ASPIRIN 81 MG CHEWABLE TABLET    Take 81 mg by mouth daily    ATORVASTATIN (LIPITOR) 20 MG TABLET    Take 10 mg by mouth daily     CALCIUM CARBONATE (OSCAL) 500 MG TABS TABLET    Take 250 mg by mouth 2 times daily    DIPHENHYDRAMINE HCL (BENADRYL PO)    Take 1 tablet by mouth every 8 hours as needed itching    DIVALPROEX (DEPAKOTE) 125 MG DR TABLET    Take 125 mg by mouth 2 times daily    DOCUSATE SODIUM (COLACE) 100 MG CAPSULE    Take 100 mg by mouth daily    ESCITALOPRAM (LEXAPRO) 10 MG TABLET    Take 10 mg by mouth daily    GUAIFENESIN (ROBITUSSIN) 100 MG/5ML LIQUID    Take 200 mg by mouth 4 times daily as needed for Cough    IPRATROPIUM-ALBUTEROL (DUONEB) 0.5-2.5 (3) MG/3ML SOLN NEBULIZER SOLUTION    Inhale 1 vial into the lungs every 4 hours as needed for Shortness of Breath    LYRICA 75 MG CAPSULE    TAKE ONE CAPSULE BY MOUTH TWICE DAILY    MAGNESIUM HYDROXIDE (MILK OF MAGNESIA) 400 MG/5ML SUSPENSION    Take 30 mLs by mouth daily as needed for Constipation    MAGNESIUM OXIDE (MAG-OX) 400 MG TABLET    Take 400 mg by mouth daily    METFORMIN (GLUCOPHAGE) 500 MG TABLET    Take 500 mg by mouth 2 times daily (with meals)    MIDODRINE (PROAMATINE) 10 MG TABLET    Take 1 tablet by mouth 3 times daily (with meals)    NYSTATIN (MYCOSTATIN) 370667 UNIT/GM POWDER    Apply topically 2 times daily Apply topically 4 times daily. PANTOPRAZOLE SODIUM (PROTONIX) 40 MG PACK PACKET    Take 40 mg by mouth daily    POTASSIUM CHLORIDE (KLOR-CON M) 20 MEQ TBCR EXTENDED RELEASE TABLET    Take 20 mEq by mouth 2 times daily (with meals)    RISPERIDONE (RISPERDAL) 0.25 MG TABLET    Take 0.25 mg by mouth nightly    TRAZODONE (DESYREL) 50 MG TABLET    Take 25 mg by mouth nightly         Review of Systems:  Review of Systems   Constitutional: Negative for activity change, chills and fever. HENT: Positive for facial swelling. Negative for congestion, dental problem, ear discharge, ear pain, nosebleeds, sore throat and trouble swallowing. Eyes: Negative for pain and visual disturbance. Respiratory: Negative for chest tightness and shortness of breath. Cardiovascular: Negative for chest pain. Gastrointestinal: Negative for abdominal pain, diarrhea, nausea and vomiting. Genitourinary: Negative for dysuria and flank pain. Musculoskeletal: Positive for neck stiffness. Negative for back pain. Skin: Positive for color change and wound. Neurological: Negative for seizures, syncope, weakness, numbness and headaches. Positives and Pertinent negatives as per HPI.   Except as noted above in the ROS, werenegative at this time or not returned at the time of this note. RADIOLOGY:   Non-plain film images such as CT, Ultrasound and MRI are read by the radiologist. Ivette Aranda PA-C have directly visualized the radiologic plain film image(s) with the below findings:      Interpretation per the Radiologist below, if available at the time of thisnote:    CT Cervical Spine WO Contrast   Final Result   No acute abnormality of the cervical spine. Multilevel degenerative disc changes and facet arthropathy with most   significant change at the C5-C6 level. There is 3 mm of anterior subluxation   of C6 on C7-unchanged from December 2017. CT Facial Bones WO Contrast   Final Result   Nasal bone fracture as discussed with possible tiny nondisplaced fracture of   the nasal septum. No fracture elsewhere         CT Head WO Contrast   Preliminary Result   1. No evidence of acute intracranial abnormality. 2. Findings consistent with presence of nasal bone fracture and possible   subtle fracture of the anterior bony nasal septum as described above. MEDICAL DECISION MAKING / ED COURSE:      PROCEDURES:   Lac Repair  Date/Time: 5/10/2019 7:46 AM  Performed by: Xenia Winters PA-C  Authorized by: Xenia Winters PA-C     Consent:     Consent obtained:  Verbal    Consent given by:  Patient    Risks discussed:  Infection and pain  Anesthesia (see MAR for exact dosages):      Anesthesia method:  Local infiltration    Local anesthetic:  Lidocaine 1% w/o epi  Laceration details:     Location:  Face    Face location:  Nose    Length (cm):  3  Repair type:     Repair type:  Simple  Exploration:     Contaminated: no    Treatment:     Area cleansed with:  Hibiclens  Skin repair:     Repair method:  Sutures    Suture size:  6-0    Suture material:  Nylon    Number of sutures:  3  Approximation:     Approximation:  Close  Post-procedure details:     Dressing:  Open (no dressing)    Patient tolerance of Percocet. The patient tolerated their visit well. I evaluated the patient. The physician was available for consultation as needed. The patient and / or the family were informed of the results of anytests, a time was given to answer questions, a plan was proposed and they agreed with plan. CLINICAL IMPRESSION:  1. Status post fall    2. Open displaced fracture of nasal bone, initial encounter    3. Cervical strain, acute, initial encounter    4. DDD (degenerative disc disease), cervical        DISPOSITION: Discharged to the [facility]        PATIENT REFERRED TO:  Stephanie Youssef, [address] [place] [address] [address] [facility],[address]  [phone]    Schedule an appointment as soon as possible for a visit       [address] [facility]  [phone]      As needed    [facility] Emergency Department  [address] [facility]  [phone]    If symptoms worsen      DISCHARGE MEDICATIONS:  New Prescriptions    CEPHALEXIN (KEFLEX) 500 MG CAPSULE    Take 1 capsule by mouth 4 times daily for 10 days    OXYCODONE-ACETAMINOPHEN (PERCOCET) 5-325 MG PER TABLET    Take 1 tablet by mouth every 4 hours as needed for Pain for up to 2 days. DISCONTINUED MEDICATIONS:  Discontinued Medications    OXYCODONE-ACETAMINOPHEN (PERCOCET) 5-325 MG PER TABLET    Take 1 tablet by mouth every 6 hours as needed for Pain. .            (Please note the MDM and HPI sections of this note were completed with a voice recognition program.  Efforts weremade to edit the dictations but occasionally words are mis-transcribed.)    Electronically signed, Ophelia Alfaro PA-C,          Tuan Lakhani PA-C  05/10/19 0408

## 2019-05-10 NOTE — ED NOTES
Report given to Emerita Salinas LPN at [facility].       Shira Beard RN  05/10/19 2758

## 2019-05-10 NOTE — ED NOTES
Pt is in bed, respirations easy, even, and unlabored. No s/s of distress. Alert and orientated. Pt has swelling and red and purple throughout nasal region, .75 in laceration to the bridge of the nose. Pt given ice for swelling.       Avinash Mason, RN  05/10/19 [id number]

## 2019-05-10 NOTE — ED NOTES
Pt resting in bed. No signs of distress. Regular respiratory pattern, normal respiratory depth, unlabored respirations. Bed in lowest position, 2/2 bedrails up, bedside table within reach, bed wheels locked. Denies any needs at this time. Call light in reach. Will continue to monitor.            Vicenta Whitaker RN  05/10/19 3235

## 2019-05-16 ENCOUNTER — OFFICE VISIT (OUTPATIENT)
Dept: ENT CLINIC | Age: 79
End: 2019-05-16
Payer: MEDICARE

## 2019-05-16 VITALS — OXYGEN SATURATION: 94 % | HEART RATE: 107 BPM | DIASTOLIC BLOOD PRESSURE: 68 MMHG | SYSTOLIC BLOOD PRESSURE: 124 MMHG

## 2019-05-16 DIAGNOSIS — S01.21XS NASAL LACERATION, SEQUELA: ICD-10-CM

## 2019-05-16 DIAGNOSIS — S02.2XXA CLOSED FRACTURE OF NASAL BONE, INITIAL ENCOUNTER: Primary | ICD-10-CM

## 2019-05-16 DIAGNOSIS — S00.83XA CONTUSION OF FACE, INITIAL ENCOUNTER: ICD-10-CM

## 2019-05-16 PROCEDURE — G8427 DOCREV CUR MEDS BY ELIG CLIN: HCPCS | Performed by: OTOLARYNGOLOGY

## 2019-05-16 PROCEDURE — 4040F PNEUMOC VAC/ADMIN/RCVD: CPT | Performed by: OTOLARYNGOLOGY

## 2019-05-16 PROCEDURE — G8399 PT W/DXA RESULTS DOCUMENT: HCPCS | Performed by: OTOLARYNGOLOGY

## 2019-05-16 PROCEDURE — 1036F TOBACCO NON-USER: CPT | Performed by: OTOLARYNGOLOGY

## 2019-05-16 PROCEDURE — 1123F ACP DISCUSS/DSCN MKR DOCD: CPT | Performed by: OTOLARYNGOLOGY

## 2019-05-16 PROCEDURE — G8417 CALC BMI ABV UP PARAM F/U: HCPCS | Performed by: OTOLARYNGOLOGY

## 2019-05-16 PROCEDURE — 1090F PRES/ABSN URINE INCON ASSESS: CPT | Performed by: OTOLARYNGOLOGY

## 2019-05-16 PROCEDURE — 99203 OFFICE O/P NEW LOW 30 MIN: CPT | Performed by: OTOLARYNGOLOGY

## 2019-05-16 ASSESSMENT — ENCOUNTER SYMPTOMS
EYES NEGATIVE: 1
ALLERGIC/IMMUNOLOGIC NEGATIVE: 1
RESPIRATORY NEGATIVE: 1

## 2019-06-18 ENCOUNTER — HOSPITAL ENCOUNTER (INPATIENT)
Age: 79
LOS: 2 days | Discharge: NURSING, REHAB OR LONG-TERM CARE | DRG: 871 | End: 2019-06-21
Attending: FAMILY MEDICINE | Admitting: INTERNAL MEDICINE
Payer: MEDICARE

## 2019-06-18 ENCOUNTER — APPOINTMENT (OUTPATIENT)
Dept: CT IMAGING | Age: 79
DRG: 871 | End: 2019-06-18
Payer: MEDICARE

## 2019-06-18 LAB
A/G RATIO: 1.4 (ref 1.1–2.2)
ALBUMIN SERPL-MCNC: 4.4 G/DL (ref 3.4–5)
ALP BLD-CCNC: 73 U/L (ref 40–129)
ALT SERPL-CCNC: 7 U/L (ref 10–40)
AMMONIA: 49 UMOL/L (ref 11–51)
ANION GAP SERPL CALCULATED.3IONS-SCNC: 14 MMOL/L (ref 3–16)
AST SERPL-CCNC: 16 U/L (ref 15–37)
BASOPHILS ABSOLUTE: 0 K/UL (ref 0–0.2)
BASOPHILS RELATIVE PERCENT: 0.5 %
BILIRUB SERPL-MCNC: 0.5 MG/DL (ref 0–1)
BUN BLDV-MCNC: 18 MG/DL (ref 7–20)
CALCIUM SERPL-MCNC: 9 MG/DL (ref 8.3–10.6)
CHLORIDE BLD-SCNC: 103 MMOL/L (ref 99–110)
CO2: 20 MMOL/L (ref 21–32)
CREAT SERPL-MCNC: 0.7 MG/DL (ref 0.6–1.2)
EOSINOPHILS ABSOLUTE: 0.1 K/UL (ref 0–0.6)
EOSINOPHILS RELATIVE PERCENT: 0.7 %
GFR AFRICAN AMERICAN: >60
GFR NON-AFRICAN AMERICAN: >60
GLOBULIN: 3.2 G/DL
GLUCOSE BLD-MCNC: 109 MG/DL (ref 70–99)
HCT VFR BLD CALC: 38.1 % (ref 36–48)
HEMATOLOGY PATH CONSULT: NO
HEMOGLOBIN: 12.7 G/DL (ref 12–16)
LACTIC ACID: 0.9 MMOL/L (ref 0.4–2)
LIPASE: 15 U/L (ref 13–60)
LYMPHOCYTES ABSOLUTE: 1.4 K/UL (ref 1–5.1)
LYMPHOCYTES RELATIVE PERCENT: 20.2 %
MACROCYTES: ABNORMAL
MCH RBC QN AUTO: 37 PG (ref 26–34)
MCHC RBC AUTO-ENTMCNC: 33.2 G/DL (ref 31–36)
MCV RBC AUTO: 111.2 FL (ref 80–100)
MONOCYTES ABSOLUTE: 0.7 K/UL (ref 0–1.3)
MONOCYTES RELATIVE PERCENT: 9.6 %
NEUTROPHILS ABSOLUTE: 4.8 K/UL (ref 1.7–7.7)
NEUTROPHILS RELATIVE PERCENT: 69 %
PDW BLD-RTO: 14 % (ref 12.4–15.4)
PLATELET # BLD: 147 K/UL (ref 135–450)
PLATELET SLIDE REVIEW: ADEQUATE
PMV BLD AUTO: 7.9 FL (ref 5–10.5)
POTASSIUM SERPL-SCNC: 5.1 MMOL/L (ref 3.5–5.1)
RBC # BLD: 3.43 M/UL (ref 4–5.2)
SLIDE REVIEW: ABNORMAL
SODIUM BLD-SCNC: 137 MMOL/L (ref 136–145)
TOTAL PROTEIN: 7.6 G/DL (ref 6.4–8.2)
TROPONIN: <0.01 NG/ML
VALPROIC ACID LEVEL: <2.8 UG/ML (ref 50–100)
WBC # BLD: 7 K/UL (ref 4–11)

## 2019-06-18 PROCEDURE — 70450 CT HEAD/BRAIN W/O DYE: CPT

## 2019-06-18 PROCEDURE — 80053 COMPREHEN METABOLIC PANEL: CPT

## 2019-06-18 PROCEDURE — 96361 HYDRATE IV INFUSION ADD-ON: CPT

## 2019-06-18 PROCEDURE — 87186 SC STD MICRODIL/AGAR DIL: CPT

## 2019-06-18 PROCEDURE — 6370000000 HC RX 637 (ALT 250 FOR IP): Performed by: FAMILY MEDICINE

## 2019-06-18 PROCEDURE — 51702 INSERT TEMP BLADDER CATH: CPT

## 2019-06-18 PROCEDURE — 83605 ASSAY OF LACTIC ACID: CPT

## 2019-06-18 PROCEDURE — 87077 CULTURE AEROBIC IDENTIFY: CPT

## 2019-06-18 PROCEDURE — 80164 ASSAY DIPROPYLACETIC ACD TOT: CPT

## 2019-06-18 PROCEDURE — 84484 ASSAY OF TROPONIN QUANT: CPT

## 2019-06-18 PROCEDURE — 93005 ELECTROCARDIOGRAM TRACING: CPT | Performed by: FAMILY MEDICINE

## 2019-06-18 PROCEDURE — 2580000003 HC RX 258: Performed by: FAMILY MEDICINE

## 2019-06-18 PROCEDURE — 87040 BLOOD CULTURE FOR BACTERIA: CPT

## 2019-06-18 PROCEDURE — 82140 ASSAY OF AMMONIA: CPT

## 2019-06-18 PROCEDURE — 83690 ASSAY OF LIPASE: CPT

## 2019-06-18 PROCEDURE — 81001 URINALYSIS AUTO W/SCOPE: CPT

## 2019-06-18 PROCEDURE — 87086 URINE CULTURE/COLONY COUNT: CPT

## 2019-06-18 PROCEDURE — 99285 EMERGENCY DEPT VISIT HI MDM: CPT

## 2019-06-18 PROCEDURE — 85025 COMPLETE CBC W/AUTO DIFF WBC: CPT

## 2019-06-18 RX ORDER — RISPERIDONE 0.25 MG/1
0.25 TABLET, FILM COATED ORAL ONCE
Status: COMPLETED | OUTPATIENT
Start: 2019-06-18 | End: 2019-06-18

## 2019-06-18 RX ORDER — 0.9 % SODIUM CHLORIDE 0.9 %
1000 INTRAVENOUS SOLUTION INTRAVENOUS ONCE
Status: COMPLETED | OUTPATIENT
Start: 2019-06-18 | End: 2019-06-19

## 2019-06-18 RX ORDER — DIVALPROEX SODIUM 250 MG/1
250 TABLET, DELAYED RELEASE ORAL ONCE
Status: COMPLETED | OUTPATIENT
Start: 2019-06-18 | End: 2019-06-18

## 2019-06-18 RX ADMIN — SODIUM CHLORIDE 1000 ML: 9 INJECTION, SOLUTION INTRAVENOUS at 23:42

## 2019-06-18 RX ADMIN — RISPERIDONE 0.25 MG: 0.25 TABLET ORAL at 23:42

## 2019-06-18 RX ADMIN — DIVALPROEX SODIUM 250 MG: 250 TABLET, DELAYED RELEASE ORAL at 23:42

## 2019-06-19 ENCOUNTER — APPOINTMENT (OUTPATIENT)
Dept: GENERAL RADIOLOGY | Age: 79
DRG: 871 | End: 2019-06-19
Payer: MEDICARE

## 2019-06-19 PROBLEM — F31.9 BIPOLAR DISORDER (HCC): Status: ACTIVE | Noted: 2018-08-02

## 2019-06-19 PROBLEM — N39.0 UTI (URINARY TRACT INFECTION): Status: ACTIVE | Noted: 2019-06-19

## 2019-06-19 PROBLEM — A41.9 SEPTIC SHOCK DUE TO URINARY TRACT INFECTION (HCC): Status: ACTIVE | Noted: 2019-06-19

## 2019-06-19 PROBLEM — N39.0 SEPTIC SHOCK DUE TO URINARY TRACT INFECTION (HCC): Status: ACTIVE | Noted: 2019-06-19

## 2019-06-19 PROBLEM — R65.21 SEPTIC SHOCK DUE TO URINARY TRACT INFECTION (HCC): Status: ACTIVE | Noted: 2019-06-19

## 2019-06-19 PROBLEM — I95.89 CHRONIC HYPOTENSION: Status: ACTIVE | Noted: 2018-10-24

## 2019-06-19 PROBLEM — F03.90 DEMENTIA (HCC): Status: ACTIVE | Noted: 2019-06-19

## 2019-06-19 LAB
ANION GAP SERPL CALCULATED.3IONS-SCNC: 10 MMOL/L (ref 3–16)
BACTERIA: ABNORMAL /HPF
BASE EXCESS ARTERIAL: -4.3 MMOL/L (ref -3–3)
BASOPHILS ABSOLUTE: 0 K/UL (ref 0–0.2)
BASOPHILS RELATIVE PERCENT: 0.2 %
BILIRUBIN URINE: NEGATIVE
BLOOD, URINE: ABNORMAL
BUN BLDV-MCNC: 13 MG/DL (ref 7–20)
CALCIUM SERPL-MCNC: 7.7 MG/DL (ref 8.3–10.6)
CARBOXYHEMOGLOBIN ARTERIAL: 0.8 % (ref 0–1.5)
CHLORIDE BLD-SCNC: 107 MMOL/L (ref 99–110)
CLARITY: ABNORMAL
CO2: 22 MMOL/L (ref 21–32)
COLOR: YELLOW
CREAT SERPL-MCNC: 0.7 MG/DL (ref 0.6–1.2)
EKG ATRIAL RATE: 150 BPM
EKG ATRIAL RATE: 72 BPM
EKG DIAGNOSIS: NORMAL
EKG DIAGNOSIS: NORMAL
EKG P AXIS: 27 DEGREES
EKG P-R INTERVAL: 102 MS
EKG Q-T INTERVAL: 320 MS
EKG Q-T INTERVAL: 412 MS
EKG QRS DURATION: 66 MS
EKG QRS DURATION: 78 MS
EKG QTC CALCULATION (BAZETT): 451 MS
EKG QTC CALCULATION (BAZETT): 510 MS
EKG R AXIS: -30 DEGREES
EKG R AXIS: -50 DEGREES
EKG T AXIS: 16 DEGREES
EKG T AXIS: 70 DEGREES
EKG VENTRICULAR RATE: 153 BPM
EKG VENTRICULAR RATE: 72 BPM
EOSINOPHILS ABSOLUTE: 0 K/UL (ref 0–0.6)
EOSINOPHILS RELATIVE PERCENT: 0.1 %
EPITHELIAL CELLS, UA: 0 /HPF (ref 0–5)
GFR AFRICAN AMERICAN: >60
GFR NON-AFRICAN AMERICAN: >60
GLUCOSE BLD-MCNC: 117 MG/DL (ref 70–99)
GLUCOSE BLD-MCNC: 121 MG/DL (ref 70–99)
GLUCOSE BLD-MCNC: 95 MG/DL (ref 70–99)
GLUCOSE URINE: NEGATIVE MG/DL
HCO3 ARTERIAL: 21.4 MMOL/L (ref 21–29)
HCT VFR BLD CALC: 36.1 % (ref 36–48)
HEMATOLOGY PATH CONSULT: NO
HEMOGLOBIN, ART, EXTENDED: 10.1 G/DL (ref 12–16)
HEMOGLOBIN: 12 G/DL (ref 12–16)
HYALINE CASTS: 5 /LPF (ref 0–8)
KETONES, URINE: NEGATIVE MG/DL
LACTIC ACID: 1.7 MMOL/L (ref 0.4–2)
LACTIC ACID: 1.8 MMOL/L (ref 0.4–2)
LEUKOCYTE ESTERASE, URINE: ABNORMAL
LYMPHOCYTES ABSOLUTE: 0.8 K/UL (ref 1–5.1)
LYMPHOCYTES RELATIVE PERCENT: 10.3 %
MCH RBC QN AUTO: 36.9 PG (ref 26–34)
MCHC RBC AUTO-ENTMCNC: 33.3 G/DL (ref 31–36)
MCV RBC AUTO: 110.9 FL (ref 80–100)
METHEMOGLOBIN ARTERIAL: 0.2 %
MICROSCOPIC EXAMINATION: YES
MONOCYTES ABSOLUTE: 0.7 K/UL (ref 0–1.3)
MONOCYTES RELATIVE PERCENT: 9.3 %
NEUTROPHILS ABSOLUTE: 6.2 K/UL (ref 1.7–7.7)
NEUTROPHILS RELATIVE PERCENT: 80.1 %
NITRITE, URINE: POSITIVE
O2 CONTENT ARTERIAL: 14 ML/DL
O2 SAT, ARTERIAL: 99.5 %
O2 THERAPY: ABNORMAL
PCO2 ARTERIAL: 41.2 MMHG (ref 35–45)
PDW BLD-RTO: 13.9 % (ref 12.4–15.4)
PERFORMED ON: ABNORMAL
PERFORMED ON: NORMAL
PH ARTERIAL: 7.33 (ref 7.35–7.45)
PH UA: 5.5 (ref 5–8)
PLATELET # BLD: 131 K/UL (ref 135–450)
PMV BLD AUTO: 7.7 FL (ref 5–10.5)
PO2 ARTERIAL: 137 MMHG (ref 75–108)
POTASSIUM SERPL-SCNC: 5.2 MMOL/L (ref 3.5–5.1)
PROTEIN UA: 30 MG/DL
RBC # BLD: 3.26 M/UL (ref 4–5.2)
RBC UA: 11 /HPF (ref 0–4)
SODIUM BLD-SCNC: 139 MMOL/L (ref 136–145)
SPECIFIC GRAVITY UA: 1.02 (ref 1–1.03)
TCO2 ARTERIAL: 50.9 MMOL/L
TROPONIN: <0.01 NG/ML
URINE REFLEX TO CULTURE: YES
URINE TYPE: ABNORMAL
UROBILINOGEN, URINE: 0.2 E.U./DL
WBC # BLD: 7.8 K/UL (ref 4–11)
WBC UA: 54 /HPF (ref 0–5)

## 2019-06-19 PROCEDURE — C1751 CATH, INF, PER/CENT/MIDLINE: HCPCS

## 2019-06-19 PROCEDURE — 87040 BLOOD CULTURE FOR BACTERIA: CPT

## 2019-06-19 PROCEDURE — 6370000000 HC RX 637 (ALT 250 FOR IP): Performed by: INTERNAL MEDICINE

## 2019-06-19 PROCEDURE — 2580000003 HC RX 258: Performed by: INTERNAL MEDICINE

## 2019-06-19 PROCEDURE — 93005 ELECTROCARDIOGRAM TRACING: CPT | Performed by: FAMILY MEDICINE

## 2019-06-19 PROCEDURE — 96375 TX/PRO/DX INJ NEW DRUG ADDON: CPT

## 2019-06-19 PROCEDURE — 2000000000 HC ICU R&B

## 2019-06-19 PROCEDURE — 96361 HYDRATE IV INFUSION ADD-ON: CPT

## 2019-06-19 PROCEDURE — 2500000003 HC RX 250 WO HCPCS: Performed by: INTERNAL MEDICINE

## 2019-06-19 PROCEDURE — 36415 COLL VENOUS BLD VENIPUNCTURE: CPT

## 2019-06-19 PROCEDURE — 99223 1ST HOSP IP/OBS HIGH 75: CPT | Performed by: INTERNAL MEDICINE

## 2019-06-19 PROCEDURE — 82803 BLOOD GASES ANY COMBINATION: CPT

## 2019-06-19 PROCEDURE — 93010 ELECTROCARDIOGRAM REPORT: CPT | Performed by: INTERNAL MEDICINE

## 2019-06-19 PROCEDURE — 6360000002 HC RX W HCPCS: Performed by: FAMILY MEDICINE

## 2019-06-19 PROCEDURE — 2580000003 HC RX 258: Performed by: FAMILY MEDICINE

## 2019-06-19 PROCEDURE — 85025 COMPLETE CBC W/AUTO DIFF WBC: CPT

## 2019-06-19 PROCEDURE — 80048 BASIC METABOLIC PNL TOTAL CA: CPT

## 2019-06-19 PROCEDURE — 83605 ASSAY OF LACTIC ACID: CPT

## 2019-06-19 PROCEDURE — 36569 INSJ PICC 5 YR+ W/O IMAGING: CPT

## 2019-06-19 PROCEDURE — 6360000002 HC RX W HCPCS: Performed by: INTERNAL MEDICINE

## 2019-06-19 PROCEDURE — 96374 THER/PROPH/DIAG INJ IV PUSH: CPT

## 2019-06-19 PROCEDURE — 94760 N-INVAS EAR/PLS OXIMETRY 1: CPT

## 2019-06-19 PROCEDURE — 71045 X-RAY EXAM CHEST 1 VIEW: CPT

## 2019-06-19 PROCEDURE — 2700000000 HC OXYGEN THERAPY PER DAY

## 2019-06-19 PROCEDURE — 6370000000 HC RX 637 (ALT 250 FOR IP): Performed by: FAMILY MEDICINE

## 2019-06-19 PROCEDURE — 02HV33Z INSERTION OF INFUSION DEVICE INTO SUPERIOR VENA CAVA, PERCUTANEOUS APPROACH: ICD-10-PCS | Performed by: INTERNAL MEDICINE

## 2019-06-19 PROCEDURE — 84484 ASSAY OF TROPONIN QUANT: CPT

## 2019-06-19 RX ORDER — RISPERIDONE 0.5 MG/1
0.5 TABLET, FILM COATED ORAL 2 TIMES DAILY
Status: ON HOLD | COMMUNITY
End: 2019-06-21 | Stop reason: HOSPADM

## 2019-06-19 RX ORDER — 0.9 % SODIUM CHLORIDE 0.9 %
1000 INTRAVENOUS SOLUTION INTRAVENOUS ONCE
Status: DISCONTINUED | OUTPATIENT
Start: 2019-06-19 | End: 2019-06-19

## 2019-06-19 RX ORDER — PREGABALIN 75 MG/1
75 CAPSULE ORAL 2 TIMES DAILY
Status: DISCONTINUED | OUTPATIENT
Start: 2019-06-19 | End: 2019-06-19

## 2019-06-19 RX ORDER — ALENDRONATE SODIUM 70 MG/1
70 TABLET ORAL
Status: DISCONTINUED | OUTPATIENT
Start: 2019-06-19 | End: 2019-06-19

## 2019-06-19 RX ORDER — ACETAMINOPHEN 325 MG/1
650 TABLET ORAL EVERY 6 HOURS PRN
Status: DISCONTINUED | OUTPATIENT
Start: 2019-06-19 | End: 2019-06-19 | Stop reason: ALTCHOICE

## 2019-06-19 RX ORDER — KETOROLAC TROMETHAMINE 30 MG/ML
15 INJECTION, SOLUTION INTRAMUSCULAR; INTRAVENOUS ONCE
Status: DISCONTINUED | OUTPATIENT
Start: 2019-06-19 | End: 2019-06-19

## 2019-06-19 RX ORDER — ACETAMINOPHEN 500 MG
1000 TABLET ORAL ONCE
Status: COMPLETED | OUTPATIENT
Start: 2019-06-19 | End: 2019-06-19

## 2019-06-19 RX ORDER — ASPIRIN 81 MG/1
81 TABLET, CHEWABLE ORAL DAILY
Status: DISCONTINUED | OUTPATIENT
Start: 2019-06-19 | End: 2019-06-21 | Stop reason: HOSPADM

## 2019-06-19 RX ORDER — POTASSIUM CHLORIDE 750 MG/1
20 TABLET, FILM COATED, EXTENDED RELEASE ORAL 2 TIMES DAILY WITH MEALS
Status: DISCONTINUED | OUTPATIENT
Start: 2019-06-19 | End: 2019-06-19

## 2019-06-19 RX ORDER — RISPERIDONE 0.5 MG/1
0.25 TABLET, FILM COATED ORAL NIGHTLY
Status: DISCONTINUED | OUTPATIENT
Start: 2019-06-19 | End: 2019-06-19

## 2019-06-19 RX ORDER — ACETAMINOPHEN 500 MG
1000 TABLET ORAL ONCE
Status: DISCONTINUED | OUTPATIENT
Start: 2019-06-19 | End: 2019-06-19

## 2019-06-19 RX ORDER — OXYCODONE HYDROCHLORIDE AND ACETAMINOPHEN 5; 325 MG/1; MG/1
1 TABLET ORAL EVERY 6 HOURS PRN
COMMUNITY
End: 2019-11-21 | Stop reason: ALTCHOICE

## 2019-06-19 RX ORDER — 0.9 % SODIUM CHLORIDE 0.9 %
1000 INTRAVENOUS SOLUTION INTRAVENOUS ONCE
Status: COMPLETED | OUTPATIENT
Start: 2019-06-19 | End: 2019-06-19

## 2019-06-19 RX ORDER — SODIUM CHLORIDE 0.9 % (FLUSH) 0.9 %
10 SYRINGE (ML) INJECTION PRN
Status: DISCONTINUED | OUTPATIENT
Start: 2019-06-19 | End: 2019-06-21 | Stop reason: HOSPADM

## 2019-06-19 RX ORDER — SODIUM CHLORIDE 0.9 % (FLUSH) 0.9 %
10 SYRINGE (ML) INJECTION EVERY 12 HOURS SCHEDULED
Status: DISCONTINUED | OUTPATIENT
Start: 2019-06-19 | End: 2019-06-21 | Stop reason: HOSPADM

## 2019-06-19 RX ORDER — PANTOPRAZOLE SODIUM 40 MG/1
40 TABLET, DELAYED RELEASE ORAL
Status: DISCONTINUED | OUTPATIENT
Start: 2019-06-19 | End: 2019-06-21 | Stop reason: HOSPADM

## 2019-06-19 RX ORDER — MIDODRINE HYDROCHLORIDE 5 MG/1
10 TABLET ORAL ONCE
Status: DISCONTINUED | OUTPATIENT
Start: 2019-06-19 | End: 2019-06-19

## 2019-06-19 RX ORDER — CEFUROXIME AXETIL 500 MG/1
500 TABLET ORAL 2 TIMES DAILY
Qty: 20 TABLET | Refills: 0 | Status: SHIPPED | OUTPATIENT
Start: 2019-06-19 | End: 2019-06-21 | Stop reason: HOSPADM

## 2019-06-19 RX ORDER — PANTOPRAZOLE SODIUM 40 MG/1
40 GRANULE, DELAYED RELEASE ORAL DAILY
Status: DISCONTINUED | OUTPATIENT
Start: 2019-06-19 | End: 2019-06-19 | Stop reason: CLARIF

## 2019-06-19 RX ORDER — SODIUM CHLORIDE 9 MG/ML
INJECTION, SOLUTION INTRAVENOUS CONTINUOUS
Status: DISCONTINUED | OUTPATIENT
Start: 2019-06-19 | End: 2019-06-20

## 2019-06-19 RX ORDER — NICOTINE POLACRILEX 4 MG
15 LOZENGE BUCCAL PRN
Status: DISCONTINUED | OUTPATIENT
Start: 2019-06-19 | End: 2019-06-21 | Stop reason: HOSPADM

## 2019-06-19 RX ORDER — RISPERIDONE 0.5 MG/1
0.5 TABLET, FILM COATED ORAL 2 TIMES DAILY
Status: DISCONTINUED | OUTPATIENT
Start: 2019-06-19 | End: 2019-06-19

## 2019-06-19 RX ORDER — LIDOCAINE HYDROCHLORIDE 10 MG/ML
5 INJECTION, SOLUTION EPIDURAL; INFILTRATION; INTRACAUDAL; PERINEURAL ONCE
Status: DISCONTINUED | OUTPATIENT
Start: 2019-06-19 | End: 2019-06-21 | Stop reason: HOSPADM

## 2019-06-19 RX ORDER — DEXTROSE MONOHYDRATE 25 G/50ML
12.5 INJECTION, SOLUTION INTRAVENOUS PRN
Status: DISCONTINUED | OUTPATIENT
Start: 2019-06-19 | End: 2019-06-21 | Stop reason: HOSPADM

## 2019-06-19 RX ORDER — KETOROLAC TROMETHAMINE 30 MG/ML
15 INJECTION, SOLUTION INTRAMUSCULAR; INTRAVENOUS ONCE
Status: COMPLETED | OUTPATIENT
Start: 2019-06-19 | End: 2019-06-19

## 2019-06-19 RX ORDER — LOPERAMIDE HYDROCHLORIDE 2 MG/1
2 CAPSULE ORAL EVERY 6 HOURS PRN
COMMUNITY
End: 2019-11-21 | Stop reason: ALTCHOICE

## 2019-06-19 RX ORDER — OXYCODONE HYDROCHLORIDE AND ACETAMINOPHEN 5; 325 MG/1; MG/1
1 TABLET ORAL EVERY 6 HOURS PRN
Status: DISCONTINUED | OUTPATIENT
Start: 2019-06-19 | End: 2019-06-21 | Stop reason: HOSPADM

## 2019-06-19 RX ORDER — ALLOPURINOL 100 MG/1
300 TABLET ORAL DAILY
Status: DISCONTINUED | OUTPATIENT
Start: 2019-06-19 | End: 2019-06-21 | Stop reason: HOSPADM

## 2019-06-19 RX ORDER — ATORVASTATIN CALCIUM 10 MG/1
10 TABLET, FILM COATED ORAL NIGHTLY
Status: DISCONTINUED | OUTPATIENT
Start: 2019-06-19 | End: 2019-06-21 | Stop reason: HOSPADM

## 2019-06-19 RX ORDER — ACETAMINOPHEN 325 MG/1
650 TABLET ORAL EVERY 4 HOURS PRN
Status: DISCONTINUED | OUTPATIENT
Start: 2019-06-19 | End: 2019-06-21 | Stop reason: HOSPADM

## 2019-06-19 RX ORDER — ESCITALOPRAM OXALATE 10 MG/1
10 TABLET ORAL DAILY
Status: DISCONTINUED | OUTPATIENT
Start: 2019-06-19 | End: 2019-06-19

## 2019-06-19 RX ORDER — ONDANSETRON 2 MG/ML
4 INJECTION INTRAMUSCULAR; INTRAVENOUS EVERY 6 HOURS PRN
Status: DISCONTINUED | OUTPATIENT
Start: 2019-06-19 | End: 2019-06-21 | Stop reason: HOSPADM

## 2019-06-19 RX ORDER — DEXTROSE MONOHYDRATE 50 MG/ML
100 INJECTION, SOLUTION INTRAVENOUS PRN
Status: DISCONTINUED | OUTPATIENT
Start: 2019-06-19 | End: 2019-06-21 | Stop reason: HOSPADM

## 2019-06-19 RX ORDER — DIVALPROEX SODIUM 250 MG/1
250 TABLET, DELAYED RELEASE ORAL ONCE
Status: DISCONTINUED | OUTPATIENT
Start: 2019-06-19 | End: 2019-06-19

## 2019-06-19 RX ORDER — MIDODRINE HYDROCHLORIDE 5 MG/1
10 TABLET ORAL
Status: DISCONTINUED | OUTPATIENT
Start: 2019-06-19 | End: 2019-06-21 | Stop reason: HOSPADM

## 2019-06-19 RX ADMIN — SODIUM CHLORIDE 1000 ML: 9 INJECTION, SOLUTION INTRAVENOUS at 04:00

## 2019-06-19 RX ADMIN — SODIUM CHLORIDE: 9 INJECTION, SOLUTION INTRAVENOUS at 13:56

## 2019-06-19 RX ADMIN — ALLOPURINOL 300 MG: 100 TABLET ORAL at 10:36

## 2019-06-19 RX ADMIN — OXYCODONE HYDROCHLORIDE AND ACETAMINOPHEN 1 TABLET: 5; 325 TABLET ORAL at 12:07

## 2019-06-19 RX ADMIN — Medication 10 ML: at 09:01

## 2019-06-19 RX ADMIN — MICONAZOLE NITRATE: 20 POWDER TOPICAL at 22:12

## 2019-06-19 RX ADMIN — MICONAZOLE NITRATE: 20 POWDER TOPICAL at 11:21

## 2019-06-19 RX ADMIN — Medication 10 ML: at 21:59

## 2019-06-19 RX ADMIN — SODIUM CHLORIDE: 9 INJECTION, SOLUTION INTRAVENOUS at 22:33

## 2019-06-19 RX ADMIN — Medication 1 G: at 00:48

## 2019-06-19 RX ADMIN — ASPIRIN 81 MG 81 MG: 81 TABLET ORAL at 10:36

## 2019-06-19 RX ADMIN — ACETAMINOPHEN 1000 MG: 500 TABLET, FILM COATED ORAL at 02:32

## 2019-06-19 RX ADMIN — OXYCODONE HYDROCHLORIDE AND ACETAMINOPHEN 1 TABLET: 5; 325 TABLET ORAL at 20:37

## 2019-06-19 RX ADMIN — ACETAMINOPHEN 650 MG: 325 TABLET, FILM COATED ORAL at 22:11

## 2019-06-19 RX ADMIN — Medication 10 ML: at 21:58

## 2019-06-19 RX ADMIN — SODIUM CHLORIDE 1000 ML: 9 INJECTION, SOLUTION INTRAVENOUS at 02:32

## 2019-06-19 RX ADMIN — SODIUM CHLORIDE: 9 INJECTION, SOLUTION INTRAVENOUS at 05:24

## 2019-06-19 RX ADMIN — ENOXAPARIN SODIUM 40 MG: 40 INJECTION SUBCUTANEOUS at 09:02

## 2019-06-19 RX ADMIN — KETOROLAC TROMETHAMINE 15 MG: 30 INJECTION, SOLUTION INTRAMUSCULAR at 02:32

## 2019-06-19 RX ADMIN — ONDANSETRON 4 MG: 2 INJECTION INTRAMUSCULAR; INTRAVENOUS at 22:14

## 2019-06-19 RX ADMIN — MIDODRINE HYDROCHLORIDE 10 MG: 5 TABLET ORAL at 10:37

## 2019-06-19 RX ADMIN — ATORVASTATIN CALCIUM 10 MG: 10 TABLET, FILM COATED ORAL at 21:58

## 2019-06-19 RX ADMIN — MIDODRINE HYDROCHLORIDE 10 MG: 5 TABLET ORAL at 14:06

## 2019-06-19 RX ADMIN — Medication 0.02 MCG/KG/MIN: at 08:46

## 2019-06-19 ASSESSMENT — PAIN DESCRIPTION - LOCATION
LOCATION: FLANK
LOCATION: HEAD

## 2019-06-19 ASSESSMENT — PAIN SCALES - GENERAL
PAINLEVEL_OUTOF10: 0
PAINLEVEL_OUTOF10: 0
PAINLEVEL_OUTOF10: 7
PAINLEVEL_OUTOF10: 3
PAINLEVEL_OUTOF10: 6
PAINLEVEL_OUTOF10: 3
PAINLEVEL_OUTOF10: 0
PAINLEVEL_OUTOF10: 6
PAINLEVEL_OUTOF10: 5
PAINLEVEL_OUTOF10: 3
PAINLEVEL_OUTOF10: 0
PAINLEVEL_OUTOF10: 5
PAINLEVEL_OUTOF10: 7

## 2019-06-19 ASSESSMENT — PAIN DESCRIPTION - ORIENTATION: ORIENTATION: LEFT

## 2019-06-19 ASSESSMENT — PAIN DESCRIPTION - DESCRIPTORS: DESCRIPTORS: HEADACHE

## 2019-06-19 NOTE — H&P
HOSPITALISTS HISTORY AND PHYSICAL    6/19/2019 6:00 AM    Patient Information:  Natalie Martinez is a 70 y.o. female [id number]  PCP:  Simon Bosch (Tel: [phone] )    Chief complaint:    Chief Complaint   Patient presents with    Other     Pt to ER via EMS from [facility] - EMS reports that pt has been continually calling dispatch x 2 days reporting people in her assisted living apartment. Facility agreed to send her to ER for evaluation, pt denies that she is hallucination however refused to stop calling 911 until they got the people out of her apartment. History of Present Illness:  Jhon Fong is 70 y.o. female who presented with complaint of confusion. Symptom onset was acute for a time period of 1 day. The severity is described as severe. The course of his symptoms over time is constant. The symptoms improved with none and worsened with none. The patient's symptom is associated with none. Jhon Fong is 70 y.o. female with history of chronic hypotension, bipolar disorder, dementia, DM II   She was previously admitted on 10/2018 for elective  right knee replacement   At that time she was evaluated by nephrology for hypotension. No etiology was found and presumed to be chronically hypotensive -  sBP in 70s  Today, she was calling EMS repeatedly and appears to be confused. She was brought from her assisted living by EMS for further evaluation   In the ED, there was concern for sepsis due to UTI. She was given at least 3 liter of IV normal saline and admitted to the ICU       The patient is a 70 y. o.female with significant past medical history of CAD, remote h/o Nephrolithiasis, COPD, DM II, HTN, AAA s/p repair, Bipolar disorder, Dementia, GERD, Gout, HPL, NAT/CEA, and multiple other co-morbidities admitted chung 10/23/18. We are consulted for hypotension. Her Bps have been upto 47Q systolic. Midodrine added yesterday but has persistent hypotension.       History obtained from ED staff and chart review  Old medical records show - chronic hypotension       REVIEW OF SYSTEMS:   Patient unable to provide history/review of system due to sleepy     Past Medical History:   has a past medical history of Abdominal aneurysm ([address] Anemia, Anesthesia complication, Aortic aneurysm ([address] Arthritis, At risk for falls, Ataxia, Avascular necrosis ([address] Back pain, Bipolar 1 disorder ([address] Blood transfusion, CAD (coronary artery disease), Chronic kidney disease, Clostridium difficile infection, Colitis due to Clostridium difficile, Confusion, COPD (chronic obstructive pulmonary disease) ([address] Dementia, Depression, Diabetes ([address] Diabetes mellitus ([address] Diarrhea, Dysuria, Fracture of right acetabulum ([address] GERD (gastroesophageal reflux disease), Gout, H/O migraine, Hyperlipidemia, Hypertension, Liver disease, Major depressive disorder, Neuropathy, Other disorders of kidney and ureter, Pneumonia, Prolonged emergence from general anesthesia, Shoulder (girdle) dystocia during labor and deliver, delivered, Type II or unspecified type diabetes mellitus without mention of complication, not stated as uncontrolled, UTI (urinary tract infection), Vitamin D deficiency, Weakness, and Wrist fracture. Past Surgical History:   has a past surgical history that includes Kidney stone surgery; Hysterectomy; Tonsillectomy; back surgery; Carotid endarterectomy (5/16/11); Colonoscopy; Abdomen surgery; fracture surgery; Appendectomy (1960); Cholecystectomy; vascular surgery; eye surgery; ERCP (2-1-2013); Abdominal aortic aneurysm repair (8/5/2013); Upper gastrointestinal endoscopy (4-28-14); Wrist surgery (Right, 2/12/16); joint replacement; joint replacement (Left, 09/28/2016); Endoscopy, colon, diagnostic; shoulder surgery (Right, 05/02/2017); and pr total knee arthroplasty (Right, 10/23/2018). Medications:  Prior to Admission medications    Medication Sig Start Date End Date Taking?  Authorizing Provider Take 250 mg by mouth 2 times daily    Historical Provider, MD   divalproex (DEPAKOTE) 125 MG DR tablet Take 125 mg by mouth 2 times daily    Historical Provider, MD   guaiFENesin (ROBITUSSIN) 100 MG/5ML liquid Take 200 mg by mouth 4 times daily as needed for Cough    Historical Provider, MD   traZODone (DESYREL) 50 MG tablet Take 25 mg by mouth nightly    Historical Provider, MD   aluminum & magnesium hydroxide-simethicone (MAALOX) 200-200-20 MG/5ML SUSP suspension Take 30 mLs by mouth every 4 hours as needed for Indigestion    Historical Provider, MD   nystatin (MYCOSTATIN) 875864 UNIT/GM powder Apply topically 2 times daily Apply topically 4 times daily. Historical Provider, MD   DiphenhydrAMINE HCl (BENADRYL PO) Take 1 tablet by mouth every 8 hours as needed itching    Historical Provider, MD   ipratropium-albuterol (DUONEB) 0.5-2.5 (3) MG/3ML SOLN nebulizer solution Inhale 1 vial into the lungs every 4 hours as needed for Shortness of Breath    Historical Provider, MD   docusate sodium (COLACE) 100 MG capsule Take 100 mg by mouth daily    Historical Provider, MD   magnesium hydroxide (MILK OF MAGNESIA) 400 MG/5ML suspension Take 30 mLs by mouth daily as needed for Constipation 9/19/15   Caron Jackson MD         Allergies: Allergies   Allergen Reactions    Clindamycin/Lincomycin Diarrhea     Hx of C.diff, Hx of fecal transplant, clindamycin contraindicated always.  Penicillins Hives    Ambien [Zolpidem Tartrate] Other (See Comments)     confusion    Chantix [Varenicline]      Visual and auditory hallucinations    Pentazocine Lactate Other (See Comments)     Severe headache    Sulfa Antibiotics Hives    Zetia [Ezetimibe] Itching        Social History:   reports that she quit smoking about 3 years ago. Her smoking use included cigarettes. She has a 5.00 pack-year smoking history. She has never used smokeless tobacco. She reports that she does not drink alcohol or use drugs.      Family History:  family history includes Cancer in her mother; Dementia in her brother; Diabetes in her mother and sister; Early Death in her father; High Blood Pressure in her brother, sister, sister, and sister; Substance Abuse in her father. Physical Exam:  BP (!) 104/54   Pulse 96   Temp 100.5 °F (38.1 °C) (Core)   Resp 16   Ht 5' 2\" (1.575 m)   Wt 147 lb 11.3 oz (67 kg)   SpO2 100%   BMI 27.02 kg/m²     General appearance:  Appears comfortable. Well nourished  Eyes: Sclera clear, pupils equal  ENT: Moist mucus membranes, no thrush. Trachea midline. Cardiovascular: Regular rhythm, normal S1, S2. No murmur, gallop, rub. No edema in lower extremities  Respiratory: Clear to auscultation bilaterally, no wheeze, good inspiratory effort  Gastrointestinal: Abdomen soft, non-tender, not distended, normal bowel sounds  Musculoskeletal: No cyanosis in digits, neck supple  Neurology: Cranial nerves grossly intact. Somnolent   Psychiatry: Appropriate affect.  Not agitated  Skin: Warm, dry, normal turgor, no rash    Labs:  CBC:   Lab Results   Component Value Date    WBC 7.0 06/18/2019    RBC 3.43 06/18/2019    HGB 12.7 06/18/2019    HCT 38.1 06/18/2019    .2 06/18/2019    MCH 37.0 06/18/2019    MCHC 33.2 06/18/2019    RDW 14.0 06/18/2019     06/18/2019    MPV 7.9 06/18/2019     BMP:    Lab Results   Component Value Date     06/18/2019    K 5.1 06/18/2019    K 5.2 04/21/2019     06/18/2019    CO2 20 06/18/2019    BUN 18 06/18/2019    CREATININE 0.7 06/18/2019    CALCIUM 9.0 06/18/2019    GFRAA >60 06/18/2019    GFRAA >60 02/04/2013    LABGLOM >60 06/18/2019    GLUCOSE 109 06/18/2019       Chest Xray:   EKG:    I visualized CXR images      CXR - No acute cardiopulmonary process    Discussed case with ED provider     Problem List  Principal Problem:    Septic shock due to urinary tract infection ([address]  Active Problems:    DM2 (diabetes mellitus, type 2) (HCC)    UTI (urinary tract

## 2019-06-19 NOTE — PROGRESS NOTES
Contacted eron lopez listed as POA. She directed me to her son Omar Rodriguez reporting he is POA. Will call Omar Mckeonon for consent for CVC as pt is on Levophed running through a 22 g PIV to right forearm. Dr. Rayo Martin notified PICC nurse not available to place PICC line until after 1400.

## 2019-06-19 NOTE — CONSULTS
5/18/2012    COPD (chronic obstructive pulmonary disease) (HCC)     Dementia     Depression     [facility]     Diabetes mellitus ([facility]     Diarrhea     Dysuria     Fracture of right acetabulum (HCC)     posterior column    GERD (gastroesophageal reflux disease)     Gout     H/O migraine     Hyperlipidemia     Hypertension     Liver disease     Major depressive disorder     Neuropathy     Other disorders of kidney and ureter     kidney stones    Pneumonia     2009    Prolonged emergence from general anesthesia     Shoulder (girdle) dystocia during labor and deliver, delivered     Type II or unspecified type diabetes mellitus without mention of complication, not stated as uncontrolled     UTI (urinary tract infection)     Vitamin D deficiency     Weakness     Wrist fracture     right     PAST SURGICAL HISTORY:  Past Surgical History:   Procedure Laterality Date    ABDOMEN SURGERY      ABDOMINAL AORTIC ANEURYSM REPAIR  8/5/2013    aorto bifemoral bypass    APPENDECTOMY  1960    BACK SURGERY      CAROTID ENDARTERECTOMY  5/16/11    right    CHOLECYSTECTOMY      COLONOSCOPY      diverticulitis; bowel surgery    ENDOSCOPY, COLON, DIAGNOSTIC      egd-dilated esoughagous    ERCP  2-1-2013    ercp with stent placement    EYE SURGERY      cataract b/l    FRACTURE SURGERY      left elbow 1992    HYSTERECTOMY      JOINT REPLACEMENT      rt hip and lt knee    JOINT REPLACEMENT Left 09/28/2016    sholuder    KIDNEY STONE SURGERY      removed abcess lt kidney and stone    [place] TOTAL KNEE ARTHROPLASTY Right 10/23/2018    RIGHT TOTAL KNEE ARTHROPLASTY - SYED STANDARD performed by Chalino Zaman MD at [facility] Right 05/02/2017    R reverse TSA with bone grafting of complex glenoid deficiency with biceps tenodesis    TONSILLECTOMY      as child    UPPER GASTROINTESTINAL ENDOSCOPY  4-28-14    VASCULAR SURGERY      WRIST SURGERY Right 2/12/16    ORIF right distal radius       FAMILY HISTORY:  family history includes Cancer in her mother; Dementia in her brother; Diabetes in her mother and sister; Early Death in her father; High Blood Pressure in her brother, sister, sister, and sister; Substance Abuse in her father. SOCIAL HISTORY:   reports that she quit smoking about 3 years ago. Her smoking use included cigarettes. She has a 5.00 pack-year smoking history. She has never used smokeless tobacco.    MEDICATIONS:  Scheduled Meds:   [START ON 6/20/2019] cefTRIAXone (ROCEPHIN) IV  1 g Intravenous Q24H    allopurinol  300 mg Oral Daily    aspirin  81 mg Oral Daily    atorvastatin  10 mg Oral Nightly    midodrine  10 mg Oral TID WC    sodium chloride flush  10 mL Intravenous 2 times per day    miconazole   Topical BID    enoxaparin  40 mg Subcutaneous Daily    pantoprazole  40 mg Oral QAM AC    lidocaine 1 % injection  5 mL Intradermal Once    sodium chloride flush  10 mL Intravenous 2 times per day     Continuous Infusions:   sodium chloride 100 mL/hr at 06/19/19 1356    norepinephrine Stopped (06/19/19 1000)     PRN Meds:  oxyCODONE-acetaminophen, sodium chloride flush, magnesium hydroxide, ondansetron, acetaminophen, sodium chloride flush    ALLERGIES:  Patient is allergic to clindamycin/lincomycin; penicillins; ambien [zolpidem tartrate]; chantix [varenicline]; pentazocine lactate; sulfa antibiotics; and zetia [ezetimibe]. REVIEW OF SYSTEMS:  Unobtainable to due unresponsiveness. PHYSICAL EXAM:  VITAL SIGNS: Blood pressure (!) 114/51, pulse 91, temperature 99.1 °F (37.3 °C), temperature source Core, resp. rate 18, height 5' 2\" (1.575 m), weight 147 lb 11.3 oz (67 kg), SpO2 94 %, not currently breastfeeding. Gen:   She was unresponsive when I saw her this morning. Eyes:   Equal pupils. Clear conjunctiva. ENT:   Lips and tongue are normal.    Neck:   Trachea is midline. No JVD. Resp:   No accessory muscle use. Clear lungs.     CV:   PMI is normal. No murmur or gallop. GI:   Soft and not distended. No tenderness. :  Edgar catheter in place. Skin:   Warm and dry. No rash. No cyanosis. Lymph:  No cervical or supraclavicular lymph nodes. M/S:  No joint swelling or tenderness in RUE, LUE, RLE, or LLE. No edema. Neuro: She was unresponsive when seen this morning. She is awake and alert now that blood pressure is normal.      LABS:  CBC:   Recent Labs     06/18/19 2226 06/19/19  0703   WBC 7.0 7.8   HGB 12.7 12.0   HCT 38.1 36.1   .2* 110.9*    131*     CHEMISTRY:   Recent Labs     06/18/19 2226 06/19/19  0703    139   K 5.1 5.2*    107   CO2 20* 22   BUN 18 13   CREATININE 0.7 0.7   GLUCOSE 109* 117*     LIVER PROFILE:   Recent Labs     06/18/19 2226   AST 16   ALT 7*   LIPASE 15.0   BILITOT 0.5   ALKPHOS 73       PT/INR: No results for input(s): PROTIME, INR in the last 72 hours. APTT: No results for input(s): APTT in the last 72 hours. ABG:   Recent Labs     06/19/19  0615   PHART 7.325*   HBJ3DJF 41.2   PO2ART 137.0*       UA:  Recent Labs     06/18/19  2326   COLORU YELLOW   PHUR 5.5   WBCUA 54*   RBCUA 11*   BACTERIA 4+*   CLARITYU CLOUDY*   SPECGRAV 1.021   LEUKOCYTESUR MODERATE*   UROBILINOGEN 0.2   BILIRUBINUR Negative   BLOODU MODERATE*   GLUCOSEU Negative   Results for Onofre Cosme (MRN [id number]) as of 6/19/2019 14:03   6/18/2019 23:26   Hyaline Casts, UA 5   WBC, UA 54 (H)   RBC, UA 11 (H)   Epi Cells 0   Bacteria, UA 4+ (A)   Microscopic Examination YES   Leukocyte Esterase, Urine MODERATE (A)       LACTIC ACID:  Results for Onofre Cosme (MRN [id number]) as of 6/19/2019 14:03   Ref.  Range 6/18/2019 22:26 6/19/2019 07:03 6/19/2019 11:14   Lactic Acid Latest Ref Range: 0.4 - 2.0 mmol/L 0.9 1.8 1.7          CHEST XRAY 6/19/19:  AP portable view of the chest time stamped at 0014 hours demonstrates normal   cardiac size.  Aortic arch is calcified.  No vascular congestion, focal consolidation, effusion, or pneumothorax is noted.  Stable scar is present at   the left base.  Bilateral shoulder arthroplasties are noted.  Osseous   structures are stable.  Mediastinal contours are unremarkable.       RECOMMENDATION:   No acute cardiopulmonary process. HEAD CT SCAN 6/18/19:  No acute intracranial abnormality.  Senescent changes including chronic   microvascular change.  Chronic deformity left nasal bone.

## 2019-06-19 NOTE — FLOWSHEET NOTE
PICC line education:    -Risks  -Benefits  -Alternatives  -Procedure    Discussed the above with patient, verbalized understanding, answered all questions. Provided with information on PICC care to review. PICC tip verified via [employer] (Ok to use). Reported off to patient's  Nurse Louise Wilburn.

## 2019-06-19 NOTE — ED NOTES
Pt transported with RN And tech attached to [employer]. Pt report given to Padmini Cook RN in ICU. IV NS infusing upon transport.       Chiquita Carlton RN  06/19/19 [id number]

## 2019-06-19 NOTE — ED NOTES
Pt assisted with changing and repositioning. Pt remains on monitor, treated for fever. See mar. Call light within reach.       Umang Meadows RN  06/19/19 [id number]

## 2019-06-19 NOTE — PROGRESS NOTES
Pt. Resting with eyes closed. Hypotensive. Pt. Arouses with touch, opens eyes, looks at you. Pt. Not disoriented X4. History of hypotension normally on Midodrine.

## 2019-06-19 NOTE — ED NOTES
Bed: 06  Expected date:   Expected time:   Means of arrival: [facility]  Comments:  JOEL [phone] [facility] RN  06/18/19 7367

## 2019-06-19 NOTE — CONSULTS
Nephrology Attending  Progress Note        SUMMARY :  We are following this patient for EFRAÍN, Hypotension    past medical history of CAD, remote h/o Nephrolithiasis, COPD, DM II, HTN, AAA s/p repair, Bipolar disorder, Dementia, GERD, Gout, HPL, NAT/CEA,         SUBJECTIVE:   Patient progress reviewed.  The patient feels weak , but better  Has anorexia     Physical Exam:    VITALS:  BP (!) 124/59   Pulse 66   Temp 98.6 °F (37 °C) (Core)   Resp 16   Ht 5' 2\" (1.575 m)   Wt 147 lb 11.3 oz (67 kg)   SpO2 98%   BMI 27.02 kg/m²   BLOOD PRESSURE RANGE:  Systolic (31RDJ), QGD:00 , Min:55 , UUB:311   ; Diastolic (84XVE), HJA:05, Min:24, Max:81    24HR INTAKE/OUTPUT:      Intake/Output Summary (Last 24 hours) at 6/19/2019 1036  Last data filed at 6/19/2019 0500  Gross per 24 hour   Intake 1284 ml   Output 875 ml   Net 409 ml       Constitutional:  Alert, oriented x 3  PERRL , EOM +  Pallor +  Neck scar  B/ L scars shoulders, knees   Cardiovascular :  RRR, No murmur/ rub   Respiratory:  B/ L air entry, normal breath sounds  Gastrointestinal:  Soft, bowel sounds +, no organomegaly   Other:  No rash  No focal neurological deficit    DATA:    CBC with Differential:    Lab Results   Component Value Date    WBC 7.8 06/19/2019    RBC 3.26 06/19/2019    HGB 12.0 06/19/2019    HCT 36.1 06/19/2019     06/19/2019    .9 06/19/2019    MCH 36.9 06/19/2019    MCHC 33.3 06/19/2019    RDW 13.9 06/19/2019    SEGSPCT 46.0 01/21/2013    BANDSPCT 24 04/27/2014    METASPCT 3 04/27/2014    LYMPHOPCT 10.3 06/19/2019    MONOPCT 9.3 06/19/2019    MYELOPCT 1.0 10/15/2011    EOSPCT 2.0 10/18/2011    BASOPCT 0.2 06/19/2019    MONOSABS 0.7 06/19/2019    LYMPHSABS 0.8 06/19/2019    EOSABS 0.0 06/19/2019    BASOSABS 0.0 06/19/2019    DIFFTYPE Manual 01/21/2013     CMP:    Lab Results   Component Value Date     06/19/2019    K 5.2 06/19/2019    K 5.2 04/21/2019     06/19/2019    CO2 22 06/19/2019    BUN 13 06/19/2019 CREATININE 0.7 06/19/2019    GFRAA >60 06/19/2019    GFRAA >60 02/04/2013    AGRATIO 1.4 06/18/2019    LABGLOM >60 06/19/2019    GLUCOSE 117 06/19/2019    PROT 7.6 06/18/2019    PROT 7.0 02/04/2013    LABALBU 4.4 06/18/2019    CALCIUM 7.7 06/19/2019    BILITOT 0.5 06/18/2019    ALKPHOS 73 06/18/2019    AST 16 06/18/2019    ALT 7 06/18/2019     Magnesium:    Lab Results   Component Value Date    MG 1.60 04/02/2019     Phosphorus:    Lab Results   Component Value Date    PHOS 3.6 09/15/2014     Uric Acid:  No results found for: LABURIC, URICACID  Last 3 Troponin:    Lab Results   Component Value Date    TROPONINI <0.01 06/19/2019    TROPONINI <0.01 06/18/2019    TROPONINI <0.01 10/26/2018     U/A:    Lab Results   Component Value Date    COLORU YELLOW 06/18/2019    PROTEINU 30 06/18/2019    PHUR 5.5 06/18/2019    LABCAST 3-5 Hyaline 04/21/2019    WBCUA 54 06/18/2019    RBCUA 11 06/18/2019    MUCUS 3+ 10/02/2011    BACTERIA 4+ 06/18/2019    CLARITYU CLOUDY 06/18/2019    SPECGRAV 1.021 06/18/2019    LEUKOCYTESUR MODERATE 06/18/2019    UROBILINOGEN 0.2 06/18/2019    BILIRUBINUR Negative 06/18/2019    BILIRUBINUR NEGATIVE 03/10/2012    BLOODU MODERATE 06/18/2019    GLUCOSEU Negative 06/18/2019    GLUCOSEU NEGATIVE 03/10/2012    AMORPHOUS 2+ 11/08/2014         IMPRESSION/RECOMMENDATIONS:      Diagnosis and Plan     1. EFRAÍN  Better  2.  Hypotension  On midodrine     Debi Marie

## 2019-06-19 NOTE — CARE COORDINATION
Discharge planning-    Patient is noted to be from [facility]. [address] [facility], left message with Jayne in admissions. Referral given to follow; also requested return call re: level of care at [facility] (Assisted Living or Independent Living). Facesheet sent via fax. Awaiting response. Will speak with the patient, as schedule allows. Addendum: Received message from Jayne/ [facility]. Patient is from [place] [address], and is on the [employer] program. They will continue to follow. Plan- From [place] [address]. Referral to [facility]. No pre cert needed for placement.     Will continue to follow for support and discharge planning.    -Jeanine Torres, MSW, LSW

## 2019-06-19 NOTE — PROGRESS NOTES
Pt. Now suddenly awake, alert, and oriented X4. VSS, afebrile. Levophed off at 1000. Bed alarm intact and call light in reach. Breakfast ordered now.

## 2019-06-19 NOTE — ED NOTES
Pt's BP in 41V systolic, pt responsive to loud verbal stimuli. Dr. Mckenna Hand to bedside.       Jennifer Centeno RN  06/19/19 7614

## 2019-06-19 NOTE — PROGRESS NOTES
Initial assessment and VS complete. See flowsheet. Pt lethargic and only responds to loud noises and pain. Pt has red, moist excoriation to breast, abdominal, and groin folds. Left breast has foul-smelling open sores and blisters. See new order for Miconazole powder. Will apply when received from pharmacy. Mepilex border applied to coccyx d/t pink, but blanchable skin. Pupils equal, round, brisk. Mucous membranes dry. Pt cool to touch and pale. Good urine output, but urine cloudy and foul-smelling. Hypotensive- awaiting response from MD. Repositioned pt to left side for comfort, using pillows for support.

## 2019-06-19 NOTE — PROGRESS NOTES
Spoke with Jesus Kelley RN. Patient has Nephrology on the care team. Will need their ok to place PICC including arm preference if there is one.

## 2019-06-19 NOTE — ED NOTES
Pt unable to wake up enough to take midodrine. Pt awakens to verbal stimuli and falls right back asleep. Dr. Aren Fox aware and states to just hold off for now.       Vanessa Cardona RN  06/19/19 9935

## 2019-06-19 NOTE — PROGRESS NOTES
Pt. Ate breakfast coffee w/ creamer and toast with butter and jelly. Reports wants to rest with eyes closed. Requests pain medication for pain 7/10 back pain. Turned and repositioned off coccyx.

## 2019-06-19 NOTE — DISCHARGE INSTR - COC
tenodesis    TONSILLECTOMY      as child    UPPER GASTROINTESTINAL ENDOSCOPY  4-28-14    VASCULAR SURGERY      WRIST SURGERY Right 2/12/16    ORIF right distal radius       Immunization History:   Immunization History   Administered Date(s) Administered    Influenza Virus Vaccine 10/01/2010, 10/03/2011, 10/04/2012, 10/07/2013    Influenza Whole 09/14/2015    Influenza, Dulcy Cocks, 3 yrs and older, IM, PF (Fluzone 3 yrs and older or Afluria 5 yrs and older) 10/02/2016    Influenza, Dulcy Cocks, 6 mo and older, IM, PF (Flulaval, Fluarix) 10/25/2018    Pneumococcal Conjugate 13-valent (Updogct12) 08/02/2018    Pneumococcal Conjugate 7-valent (Prevnar7) 05/12/2009    Pneumococcal Polysaccharide (Ccdffmhlc44) 02/13/2016    Td, unspecified formulation 11/24/2014       Active Problems:  Patient Active Problem List   Diagnosis Code    Diarrhea R19.7    Hypokalemia E87.6    Carotid bruit R09.89    Clostridium difficile colitis A04.72    COPD (chronic obstructive pulmonary disease) ([facility]) J44.9    Cataract H26.9    Confusion R41.0    Abdominal pain R10.9    Vomiting R11.10    Liver function test abnormality R94.5    Abdominal aortic aneurysm dissection ([facility]) I71.02    Arthritis, shoulder region M19.019    Altered mental status R41.82    DDD (degenerative disc disease), cervical M50.30    Colitis K52.9    Gastritis K29.70    Delirium R41.0    Sepsis ([facility] A41.9    2/12/16 ORIF distal radius S52.501A    Wrist fracture S62.109A    High cholesterol E78.00    DM2 (diabetes mellitus, type 2) ([facility]) E11.9    Macrocytosis without anemia D75.89    Arthritis of left shoulder region M19.012    Biceps tendonitis on left M75.22    S/p reverse total shoulder arthroplasty Z96.619    Arthritis of shoulder region, right M19.011    Bipolar disorder ([facility]) F31.9    10/23/18 RIGHT TKA M17.11    Acute blood loss as cause of postoperative anemia D62    Thrombocytopenia ([facility]) D69.6    Chronic hypotension I95.89    UTI (urinary tract infection) N39.0    Septic shock due to urinary tract infection (HCC) A41.9, R65.21, N39.0    Dementia F03.90       Isolation/Infection:   Isolation          No Isolation        Patient Infection Status     Infection Encounter Level? Onset Date Added Added By Resolved Resolved By Review Date    C-diff (Clostridium difficile) (RETIRED) No  08/02/11 Lawernce Feathers 11/10/11 Lawernce Feathers     (+) 8/1/11    C-diff (Clostridium difficile) (RETIRED) No  04/27/11 Brigid Muniz RN 08/01/11 Lawernce Feathers           Nurse Assessment:  Last Vital Signs: BP (!) 89/45   Pulse 63   Temp 98.6 °F (37 °C) (Core)   Resp 16   Ht 5' 2\" (1.575 m)   Wt 147 lb 11.3 oz (67 kg)   SpO2 99%   BMI 27.02 kg/m²     Last documented pain score (0-10 scale): Pain Level: 0  Last Weight:   Wt Readings from Last 1 Encounters:   06/19/19 147 lb 11.3 oz (67 kg)     Mental Status:  oriented and alert    IV Access:  - None    Nursing Mobility/ADLs:  Walking   Assisted  Transfer  Assisted  Bathing  Assisted  Dressing  Assisted  Toileting  Assisted  Feeding  Assisted  Med Admin  Assisted  Med Delivery   whole    Wound Care Documentation and Therapy:  Incision 10/23/18 Knee Right (Active)   Number of days: 238        Elimination:  Continence:   · Bowel: Yes  · Bladder: Yes  Urinary Catheter: None   Colostomy/Ileostomy/Ileal Conduit: No       Date of Last BM: 6/18/2019    Intake/Output Summary (Last 24 hours) at 6/19/2019 0822  Last data filed at 6/19/2019 0500  Gross per 24 hour   Intake 1284 ml   Output 875 ml   Net 409 ml     I/O last 3 completed shifts: In: 8997 [I.V.:1284]  Out: 875 [Urine:875]    Safety Concerns: At Risk for Falls    Impairments/Disabilities:      None    Nutrition Therapy:  Current Nutrition Therapy:   - Oral Diet:  General    Routes of Feeding: Oral  Liquids:  Thin Liquids  Daily Fluid Restriction: no  Last Modified Barium Swallow with Video (Video Swallowing Test): not done    Treatments at the Time of Hospital Discharge:   Respiratory Treatments: NONE  Oxygen Therapy:  is not on home oxygen therapy. Ventilator:    - No ventilator support    Rehab Therapies: Physical Therapy and Occupational Therapy  Weight Bearing Status/Restrictions: No weight bearing restirctions  Other Medical Equipment (for information only, NOT a DME order):  walker  Other Treatments: NONE      Patient's personal belongings (please select all that are sent with patient):  None    RN SIGNATURE:  Electronically signed by Dakota Daigle RN on 6/21/19 at 1:12 PM    CASE MANAGEMENT/SOCIAL WORK SECTION    Inpatient Status Date: 6/19/19    Readmission Risk Assessment Score:  Readmission Risk              Risk of Unplanned Readmission:        23           Discharging to Facility/ Yeni [facility]  [facility] [phone]  F. [phone]    · Name: Elie Schwarz  · Address: [address] [place] [address]  · Phone: [phone]  · Fax: [phone]        / signature: Electronically signed by SAMMIE Ruelas on 6/19/19 at 8:22 AM    PHYSICIAN SECTION    Prognosis: Guarded    Condition at Discharge: Stable    Rehab Potential (if transferring to Rehab): Poor    Recommended Labs or Other Treatments After Discharge: PT OT     Physician Certification: I certify the above information and transfer of Rosio Baez  is necessary for the continuing treatment of the diagnosis listed and that she requires Intermediate Nursing Care for greater 30 days.      Update Admission H&P: No change in H&P    PHYSICIAN SIGNATURE:  Electronically signed by Monica Garcia MD on 6/21/19 at 5:14 PM

## 2019-06-19 NOTE — PROGRESS NOTES
Dr. Sue Patrick at bedside to assess. UO good- see I&O flowsheet. B/P 56/33. States chronic problem according to records from last admission in October. See new order for repeat labs and nephro consult. Discussed that pt unable to take Midodrine d/t drowsiness.

## 2019-06-19 NOTE — PROGRESS NOTES
4 Eyes Skin Assessment     The patient is being assess for  Admission    I agree that 2 RN's have performed a thorough Head to Toe Skin Assessment on the patient. ALL assessment sites listed below have been assessed. Areas assessed by both nurses:   [x]   Head, Face, and Ears   [x]   Shoulders, Back, and Chest  [x]   Arms, Elbows, and Hands   [x]   Coccyx, Sacrum, and IschIum  [x]   Legs, Feet, and Heels        Does the Patient have Skin Breakdown? Yes LDA WOUND CARE was Initiated documentation include the Sheryl-wound, Wound Assessment, Measurements, Dressing Treatment, Drainage, and Color\",         Redness to coccyx, however blanchable. Foul-smelling excoriation and redness under bilateral breasts and abdominal and groin folds.  Blisters noted under left breast.    Shiv Prevention initiated:  Yes   Wound Care Orders initiated:  Yes      [address]  nurse consulted for Pressure Injury (Stage 3,4, Unstageable, DTI, NWPT, and Complex wounds), New and Established Ostomies:  NA      Nurse 1 eSignature: Electronically signed by Suzy Velásquez RN on 6/19/19 at 5:26 AM    **SHARE this note so that the co-signing nurse is able to place an eSignature**    Nurse 2 eSignature: Electronically signed by Everardo Valentin RN on 6/19/19 at 5:52 AM

## 2019-06-20 LAB
ANION GAP SERPL CALCULATED.3IONS-SCNC: 12 MMOL/L (ref 3–16)
BASOPHILS ABSOLUTE: 0 K/UL (ref 0–0.2)
BASOPHILS RELATIVE PERCENT: 0.1 %
BUN BLDV-MCNC: 10 MG/DL (ref 7–20)
CALCIUM SERPL-MCNC: 7.6 MG/DL (ref 8.3–10.6)
CHLORIDE BLD-SCNC: 108 MMOL/L (ref 99–110)
CO2: 22 MMOL/L (ref 21–32)
CREAT SERPL-MCNC: 0.6 MG/DL (ref 0.6–1.2)
EOSINOPHILS ABSOLUTE: 0 K/UL (ref 0–0.6)
EOSINOPHILS RELATIVE PERCENT: 0.4 %
GFR AFRICAN AMERICAN: >60
GFR NON-AFRICAN AMERICAN: >60
GLUCOSE BLD-MCNC: 102 MG/DL (ref 70–99)
GLUCOSE BLD-MCNC: 106 MG/DL (ref 70–99)
GLUCOSE BLD-MCNC: 109 MG/DL (ref 70–99)
GLUCOSE BLD-MCNC: 109 MG/DL (ref 70–99)
GLUCOSE BLD-MCNC: 118 MG/DL (ref 70–99)
HCT VFR BLD CALC: 33.1 % (ref 36–48)
HEMOGLOBIN: 11.1 G/DL (ref 12–16)
LYMPHOCYTES ABSOLUTE: 1.1 K/UL (ref 1–5.1)
LYMPHOCYTES RELATIVE PERCENT: 18.8 %
MAGNESIUM: 1.4 MG/DL (ref 1.8–2.4)
MCH RBC QN AUTO: 36.6 PG (ref 26–34)
MCHC RBC AUTO-ENTMCNC: 33.5 G/DL (ref 31–36)
MCV RBC AUTO: 109.2 FL (ref 80–100)
MONOCYTES ABSOLUTE: 0.5 K/UL (ref 0–1.3)
MONOCYTES RELATIVE PERCENT: 8.5 %
NEUTROPHILS ABSOLUTE: 4.3 K/UL (ref 1.7–7.7)
NEUTROPHILS RELATIVE PERCENT: 72.2 %
PDW BLD-RTO: 13.9 % (ref 12.4–15.4)
PERFORMED ON: ABNORMAL
PLATELET # BLD: 134 K/UL (ref 135–450)
PMV BLD AUTO: 7.5 FL (ref 5–10.5)
POTASSIUM REFLEX MAGNESIUM: 3.9 MMOL/L (ref 3.5–5.1)
RBC # BLD: 3.03 M/UL (ref 4–5.2)
SODIUM BLD-SCNC: 142 MMOL/L (ref 136–145)
WBC # BLD: 5.9 K/UL (ref 4–11)

## 2019-06-20 PROCEDURE — 2580000003 HC RX 258: Performed by: INTERNAL MEDICINE

## 2019-06-20 PROCEDURE — 6360000002 HC RX W HCPCS: Performed by: INTERNAL MEDICINE

## 2019-06-20 PROCEDURE — 1200000000 HC SEMI PRIVATE

## 2019-06-20 PROCEDURE — 97530 THERAPEUTIC ACTIVITIES: CPT

## 2019-06-20 PROCEDURE — 85025 COMPLETE CBC W/AUTO DIFF WBC: CPT

## 2019-06-20 PROCEDURE — 99232 SBSQ HOSP IP/OBS MODERATE 35: CPT | Performed by: INTERNAL MEDICINE

## 2019-06-20 PROCEDURE — 6370000000 HC RX 637 (ALT 250 FOR IP): Performed by: INTERNAL MEDICINE

## 2019-06-20 PROCEDURE — 94760 N-INVAS EAR/PLS OXIMETRY 1: CPT

## 2019-06-20 PROCEDURE — 83735 ASSAY OF MAGNESIUM: CPT

## 2019-06-20 PROCEDURE — 97161 PT EVAL LOW COMPLEX 20 MIN: CPT

## 2019-06-20 PROCEDURE — 80048 BASIC METABOLIC PNL TOTAL CA: CPT

## 2019-06-20 PROCEDURE — 97165 OT EVAL LOW COMPLEX 30 MIN: CPT

## 2019-06-20 RX ORDER — OXYMETAZOLINE HYDROCHLORIDE 0.05 G/100ML
2 SPRAY NASAL 2 TIMES DAILY
Status: DISCONTINUED | OUTPATIENT
Start: 2019-06-20 | End: 2019-06-21 | Stop reason: HOSPADM

## 2019-06-20 RX ORDER — MAGNESIUM SULFATE 1 G/100ML
1 INJECTION INTRAVENOUS PRN
Status: DISCONTINUED | OUTPATIENT
Start: 2019-06-20 | End: 2019-06-21 | Stop reason: HOSPADM

## 2019-06-20 RX ORDER — CETIRIZINE HYDROCHLORIDE 10 MG/1
5 TABLET ORAL DAILY
Status: DISCONTINUED | OUTPATIENT
Start: 2019-06-20 | End: 2019-06-21 | Stop reason: HOSPADM

## 2019-06-20 RX ORDER — MAGNESIUM SULFATE IN WATER 40 MG/ML
2 INJECTION, SOLUTION INTRAVENOUS ONCE
Status: COMPLETED | OUTPATIENT
Start: 2019-06-20 | End: 2019-06-20

## 2019-06-20 RX ADMIN — MIDODRINE HYDROCHLORIDE 10 MG: 5 TABLET ORAL at 18:03

## 2019-06-20 RX ADMIN — ATORVASTATIN CALCIUM 10 MG: 10 TABLET, FILM COATED ORAL at 21:22

## 2019-06-20 RX ADMIN — ENOXAPARIN SODIUM 40 MG: 40 INJECTION SUBCUTANEOUS at 09:05

## 2019-06-20 RX ADMIN — MAGNESIUM SULFATE HEPTAHYDRATE 2 G: 40 INJECTION, SOLUTION INTRAVENOUS at 06:25

## 2019-06-20 RX ADMIN — MICONAZOLE NITRATE: 20 POWDER TOPICAL at 09:12

## 2019-06-20 RX ADMIN — ASPIRIN 81 MG 81 MG: 81 TABLET ORAL at 09:04

## 2019-06-20 RX ADMIN — CETIRIZINE HYDROCHLORIDE 5 MG: 10 TABLET, FILM COATED ORAL at 09:04

## 2019-06-20 RX ADMIN — ALLOPURINOL 300 MG: 100 TABLET ORAL at 09:04

## 2019-06-20 RX ADMIN — OXYCODONE HYDROCHLORIDE AND ACETAMINOPHEN 1 TABLET: 5; 325 TABLET ORAL at 09:04

## 2019-06-20 RX ADMIN — MICONAZOLE NITRATE: 20 POWDER TOPICAL at 21:21

## 2019-06-20 RX ADMIN — Medication 10 ML: at 21:22

## 2019-06-20 RX ADMIN — OXYMETAZOLINE HCL 2 SPRAY: 0.05 SPRAY NASAL at 09:05

## 2019-06-20 RX ADMIN — SODIUM CHLORIDE: 9 INJECTION, SOLUTION INTRAVENOUS at 06:30

## 2019-06-20 RX ADMIN — Medication 10 ML: at 09:14

## 2019-06-20 RX ADMIN — Medication 10 ML: at 06:30

## 2019-06-20 RX ADMIN — Medication 1 G: at 01:59

## 2019-06-20 RX ADMIN — Medication 10 ML: at 09:05

## 2019-06-20 RX ADMIN — OXYCODONE HYDROCHLORIDE AND ACETAMINOPHEN 1 TABLET: 5; 325 TABLET ORAL at 21:22

## 2019-06-20 RX ADMIN — OXYMETAZOLINE HCL 2 SPRAY: 0.05 SPRAY NASAL at 21:21

## 2019-06-20 RX ADMIN — MIDODRINE HYDROCHLORIDE 10 MG: 5 TABLET ORAL at 09:04

## 2019-06-20 RX ADMIN — MIDODRINE HYDROCHLORIDE 10 MG: 5 TABLET ORAL at 12:31

## 2019-06-20 RX ADMIN — PANTOPRAZOLE SODIUM 40 MG: 40 TABLET, DELAYED RELEASE ORAL at 04:39

## 2019-06-20 RX ADMIN — Medication 10 ML: at 04:39

## 2019-06-20 RX ADMIN — OXYCODONE HYDROCHLORIDE AND ACETAMINOPHEN 1 TABLET: 5; 325 TABLET ORAL at 15:04

## 2019-06-20 ASSESSMENT — PAIN SCALES - GENERAL
PAINLEVEL_OUTOF10: 10
PAINLEVEL_OUTOF10: 8
PAINLEVEL_OUTOF10: 3
PAINLEVEL_OUTOF10: 0
PAINLEVEL_OUTOF10: 5
PAINLEVEL_OUTOF10: 4
PAINLEVEL_OUTOF10: 4
PAINLEVEL_OUTOF10: 6
PAINLEVEL_OUTOF10: 0
PAINLEVEL_OUTOF10: 3
PAINLEVEL_OUTOF10: 10
PAINLEVEL_OUTOF10: 0

## 2019-06-20 NOTE — PROGRESS NOTES
Old dried blood noted to right PICC dressing and arm. Removed, cleaned, and dressing changed using sterile technique. Pt states she keeps getting \"hot then cold\" and c/o being \"sweaty. \" Pt febrile- 101.1 on temp-sensing leon monitor. Tylenol given per MD order. Will reassess. Pt repositioned for comfort. Denies needs at this time. Did not eat dinner- states she is nauseous on and off. Zofran provided per MD order. Will reassess. Call light in reach.

## 2019-06-20 NOTE — PROGRESS NOTES
Pulmonary & Critical Care Medicine ICU Progress Note      ASSESSMENT AND PLAN:     Severe Sepsis due to urinary tract infection  · Resolved  Septic Shock   · Resolved  Urinary Tract Infection  · Gram negative germain growing in urine  · Rocephin  Acute Metabolic Encephalopathy  · Resolved  Chronic Hypotension  · Blood pressure is normal with Midodrine  Allergic Sinusitis and Rhinitis  · Zyrtec  · Afrin nasal spray for 2 days  Diabetes Mellitus  · Glucose is normal without Metformin  Prophylaxis  · Lovenox for DVT Prophylaxis       UPDATE:   Afebrile. Room air. As soon as Levophed was started yesterday, she woke up and mental status is normal.  Levophed was weaned off yesterday. Urine culture is positive. HOSPITAL VISIT:  She was seen during ICU Rounds this morning. CHIEF COMPLAINT:  Sinus congestion    HISTORY:  She has moderate sinus congestion. Her nose is plugged up. REVIEW OF SYMPTOMS:  No complaints.         IV:   dextrose         Intake/Output Summary (Last 24 hours) at 6/20/2019 1752  Last data filed at 6/20/2019 1400  Gross per 24 hour   Intake 2319 ml   Output 2850 ml   Net -531 ml       MEDICATIONS:  Scheduled Meds:   cetirizine  5 mg Oral Daily    oxymetazoline  2 spray Each Nostril BID    cefTRIAXone (ROCEPHIN) IV  1 g Intravenous Q24H    allopurinol  300 mg Oral Daily    aspirin  81 mg Oral Daily    atorvastatin  10 mg Oral Nightly    midodrine  10 mg Oral TID WC    sodium chloride flush  10 mL Intravenous 2 times per day    miconazole   Topical BID    enoxaparin  40 mg Subcutaneous Daily    pantoprazole  40 mg Oral QAM AC    lidocaine 1 % injection  5 mL Intradermal Once    sodium chloride flush  10 mL Intravenous 2 times per day    insulin lispro  0-6 Units Subcutaneous TID WC    insulin lispro  0-3 Units Subcutaneous Nightly     PRN Meds:  magnesium sulfate, oxyCODONE-acetaminophen, sodium chloride flush, magnesium hydroxide, ondansetron, acetaminophen, sodium chloride flush, glucose, dextrose, glucagon (rDNA), dextrose      PHYSICAL EXAM:  Vital Signs: /60   Pulse 68   Temp 98.2 °F (36.8 °C) (Temporal)   Resp 11   Ht 5' 2\" (1.575 m)   Wt 145 lb 4.5 oz (65.9 kg)   SpO2 99%   BMI 26.57 kg/m²      Gen:   No distress. Breathing comfortably at rest.   Resp:   No accessory muscle use. No wheezing. Neuro:  Awake and alert. Normal muscle tone. No tremor. LAB RESULTS:  CBC:   Recent Labs     06/18/19 2226 06/19/19  0703 06/20/19  0440   WBC 7.0 7.8 5.9   HGB 12.7 12.0 11.1*   HCT 38.1 36.1 33.1*   .2* 110.9* 109.2*    131* 134*     CHEMISTRY:   Recent Labs     06/18/19 2226 06/19/19  0703 06/20/19  0440    139 142   K 5.1 5.2* 3.9    107 108   CO2 20* 22 22   BUN 18 13 10   CREATININE 0.7 0.7 0.6   GLUCOSE 109* 117* 109*     LIVER PROFILE:   Recent Labs     06/18/19 2226   AST 16   ALT 7*   LIPASE 15.0   BILITOT 0.5   ALKPHOS 73     ABG:   Recent Labs     06/19/19  0615   PHART 7.325*   LAX4YPA 41.2   PO2ART 137.0*       GLUCOSE MANAGEMENT:  Results for Richar Moon (MRN [id number]) as of 6/20/2019 17:52   Ref. Range 6/20/2019 08:40 6/20/2019 12:31 6/20/2019 16:52   POC Glucose Latest Ref Range: 70 - 99 mg/dl 106 (H) 118 (H) 109 (H)         CULTURES:  Blood Cultures 6/19/19:  No growth so far   Urine Culture 6/18/19:   Gram negative germain      CHEST XRAY 6/19/19:  AP portable view of the chest time stamped at 0014 hours demonstrates normal   cardiac size.  Aortic arch is calcified.  No vascular congestion, focal   consolidation, effusion, or pneumothorax is noted.  Stable scar is present at   the left base.  Bilateral shoulder arthroplasties are noted.  Osseous   structures are stable.  Mediastinal contours are unremarkable. ECHOCARDIOGRAM 4/2/19:   -Normal left ventricle size, wall thickness, and systolic function with an   estimated ejection fraction of 55-60%.    -No regional wall motion abnormalities are seen.   -Aortic valve appears sclerotic but opens adequately. Mild aortic   regurgitation.   -Thickened mitral valve without evidence of stenosis. There is moderate   mitral regurgitation noted.   -There is mild tricuspid regurgitation with a RVSP estimation of 35 mmHg.   -Mild pulmonic regurgitation present.   -There is trivial to mild circumferential pericardial effusion noted.   -Normal diastolic function.  Avg. E/e'=12.05

## 2019-06-20 NOTE — PROGRESS NOTES
Nephrology Attending  Progress Note        SUMMARY :  We are following this patient for EFRAÍN, Hypotension    past medical history of CAD, remote h/o Nephrolithiasis, COPD, DM II, HTN, AAA s/p repair, Bipolar disorder, Dementia, GERD, Gout, HPL, NAT/CEA,       SUBJECTIVE:   Patient progress reviewed.  The patient feels weak , but better  Has anorexia     Physical Exam:    VITALS:  /83   Pulse 94   Temp 98.7 °F (37.1 °C) (Core)   Resp 17   Ht 5' 2\" (1.575 m)   Wt 145 lb 4.5 oz (65.9 kg)   SpO2 98%   BMI 26.57 kg/m²   BLOOD PRESSURE RANGE:  Systolic (79XSH), KWZ:652 , Min:92 , DMO:645   ; Diastolic (31TNL), ZHF:19, Min:48, Max:83    24HR INTAKE/OUTPUT:      Intake/Output Summary (Last 24 hours) at 6/20/2019 0955  Last data filed at 6/20/2019 0905  Gross per 24 hour   Intake 4035.6 ml   Output 2625 ml   Net 1410.6 ml       Constitutional:  Alert, oriented x 3  PERRL , EOM +  Pallor +  Neck scar  B/ L scars shoulders, knees   Cardiovascular :  RRR, No murmur/ rub   Respiratory:  B/ L air entry, normal breath sounds  Gastrointestinal:  Soft, bowel sounds +, no organomegaly   Other:  No rash  No focal neurological deficit    DATA:    CBC with Differential:    Lab Results   Component Value Date    WBC 5.9 06/20/2019    RBC 3.03 06/20/2019    HGB 11.1 06/20/2019    HCT 33.1 06/20/2019     06/20/2019    .2 06/20/2019    MCH 36.6 06/20/2019    MCHC 33.5 06/20/2019    RDW 13.9 06/20/2019    SEGSPCT 46.0 01/21/2013    BANDSPCT 24 04/27/2014    METASPCT 3 04/27/2014    LYMPHOPCT 18.8 06/20/2019    MONOPCT 8.5 06/20/2019    MYELOPCT 1.0 10/15/2011    EOSPCT 2.0 10/18/2011    BASOPCT 0.1 06/20/2019    MONOSABS 0.5 06/20/2019    LYMPHSABS 1.1 06/20/2019    EOSABS 0.0 06/20/2019    BASOSABS 0.0 06/20/2019    DIFFTYPE Manual 01/21/2013     CMP:    Lab Results   Component Value Date     06/20/2019    K 3.9 06/20/2019     06/20/2019    CO2 22 06/20/2019    BUN 10 06/20/2019    CREATININE 0.6 06/20/2019    GFRAA >60 06/20/2019    GFRAA >60 02/04/2013    AGRATIO 1.4 06/18/2019    LABGLOM >60 06/20/2019    GLUCOSE 109 06/20/2019    PROT 7.6 06/18/2019    PROT 7.0 02/04/2013    LABALBU 4.4 06/18/2019    CALCIUM 7.6 06/20/2019    BILITOT 0.5 06/18/2019    ALKPHOS 73 06/18/2019    AST 16 06/18/2019    ALT 7 06/18/2019     Magnesium:    Lab Results   Component Value Date    MG 1.40 06/20/2019     Phosphorus:    Lab Results   Component Value Date    PHOS 3.6 09/15/2014     Uric Acid:  No results found for: LABURIC, URICACID  Last 3 Troponin:    Lab Results   Component Value Date    TROPONINI <0.01 06/19/2019    TROPONINI <0.01 06/18/2019    TROPONINI <0.01 10/26/2018     U/A:    Lab Results   Component Value Date    COLORU YELLOW 06/18/2019    PROTEINU 30 06/18/2019    PHUR 5.5 06/18/2019    LABCAST 3-5 Hyaline 04/21/2019    WBCUA 54 06/18/2019    RBCUA 11 06/18/2019    MUCUS 3+ 10/02/2011    BACTERIA 4+ 06/18/2019    CLARITYU CLOUDY 06/18/2019    SPECGRAV 1.021 06/18/2019    LEUKOCYTESUR MODERATE 06/18/2019    UROBILINOGEN 0.2 06/18/2019    BILIRUBINUR Negative 06/18/2019    BILIRUBINUR NEGATIVE 03/10/2012    BLOODU MODERATE 06/18/2019    GLUCOSEU Negative 06/18/2019    GLUCOSEU NEGATIVE 03/10/2012    AMORPHOUS 2+ 11/08/2014         IMPRESSION/RECOMMENDATIONS:      Diagnosis and Plan     1. EFRAÍN  Recovered   2.  Chronic Hypotension  Off levophed   On midodrine     Dc Keller

## 2019-06-20 NOTE — PROGRESS NOTES
Assessment and VS complete. See flowsheet. Pt A&O. Pt talkative, lively, alert, and happy. Sitting up in bed, watching television. Pt c/o pain in left flank (\"kidney\") 8/10. Percocet given per MD order. Will reassess. Repositioned for comfort, using pillows for support. POC discussed at length. Pt denies further needs. Call light in reach.

## 2019-06-20 NOTE — PROGRESS NOTES
Reassessment and VS completed. See flowsheet. Pt resting comfortably in bed with eyes closed. Denies needs. Denies pain. Afebrile. Call light in reach.

## 2019-06-20 NOTE — PROGRESS NOTES
Pt resting comfortably in bed with eyes closed. No s/s of distress or discomfort. Respirations easy and effortless. Repositioned per self as needed. Call light in reach.

## 2019-06-20 NOTE — PROGRESS NOTES
Physical Therapy    Facility/Department: [facility] ICU  Initial Assessment    NAME: Claudene Ohs  : 1940  MRN: [id number]    Date of Service: 2019    Discharge Recommendations:    Claudene Ohs scored a 21/24 on the AM-PAC short mobility form. At this time, no further PT is recommended upon discharge due to pt being close to baseline function. Recommend patient returns to prior setting with prior services. PT Equipment Recommendations  Equipment Needed: No    Assessment   Body structures, Functions, Activity limitations: Decreased functional mobility ; Decreased strength;Decreased balance;Decreased safe awareness  Assessment: Pt is near baseline for all functional mobility. Pt will continue to benefit from skilled PT during acute care stay to work toward prior baseline function. Prognosis: Good  Decision Making: Low Complexity  History: UTI, hallucinations leading to admission  Exam: [facility]  Clinical Presentation: stable  Patient Education: Pt educated on reason for acute care PT, discharge recommendations, and proper RW managment. REQUIRES PT FOLLOW UP: Yes  Activity Tolerance  Activity Tolerance: Patient Tolerated treatment well       Patient Diagnosis(es): The primary encounter diagnosis was Confusion. Diagnoses of Acute urinary tract infection, Sinus tachycardia, Rigors, and Septicemia ([address] were also pertinent to this visit.      has a past medical history of Abdominal aneurysm ([address] Anemia, Anesthesia complication, Aortic aneurysm ([address] Arthritis, At risk for falls, Ataxia, Avascular necrosis ([address] Back pain, Bipolar 1 disorder ([address] Blood transfusion, CAD (coronary artery disease), Chronic kidney disease, Clostridium difficile infection, Colitis due to Clostridium difficile, Confusion, COPD (chronic obstructive pulmonary disease) ([address] Dementia, Depression, Diabetes ([address] Diabetes mellitus ([address] Diarrhea, Dysuria, Fracture of right acetabulum ([address] GERD (gastroesophageal reflux disease), Gout, H/O migraine, Hyperlipidemia, Hypertension, Liver disease, Major depressive disorder, Neuropathy, Other disorders of kidney and ureter, Pneumonia, Prolonged emergence from general anesthesia, Shoulder (girdle) dystocia during labor and deliver, delivered, Type II or unspecified type diabetes mellitus without mention of complication, not stated as uncontrolled, UTI (urinary tract infection), Vitamin D deficiency, Weakness, and Wrist fracture. has a past surgical history that includes Kidney stone surgery; Hysterectomy; Tonsillectomy; back surgery; Carotid endarterectomy (5/16/11); Colonoscopy; Abdomen surgery; fracture surgery; Appendectomy (1960); Cholecystectomy; vascular surgery; eye surgery; ERCP (2-1-2013); Abdominal aortic aneurysm repair (8/5/2013); Upper gastrointestinal endoscopy (4-28-14); Wrist surgery (Right, 2/12/16); joint replacement; joint replacement (Left, 09/28/2016); Endoscopy, colon, diagnostic; shoulder surgery (Right, 05/02/2017); and pr total knee arthroplasty (Right, 10/23/2018). Restrictions  Restrictions/Precautions  Restrictions/Precautions: Fall Risk(high fall risk)  Position Activity Restriction  Other position/activity restrictions: Philbert Spurling is a 70 y.o. female that presents with the above hallucinations noted. Patient in the emergency department is pleasant but does complain of the hallucinations of denies them now. She denies chest pain. She denies dysuria. No fevers chills nausea vomiting diarrhea or constipation. Review of systems other than hallucinations is negative.  Found to be septic with UTI  Vision/Hearing  Vision: Impaired  Vision Exceptions: Wears glasses for reading  Hearing: Within functional limits     Subjective  General  Chart Reviewed: Yes  Family / Caregiver Present: No  Diagnosis: UTI  Follows Commands: Within Functional Limits  Other (Comment): chronic hypotension, bipolar disorder, dementia, DM II   General Comment  Comments: Pt supine in bed upon arrival and agreeable to therapy. Subjective  Subjective: Denied pain at rest. Lacy Blessing for lunch.   Pain Screening  Patient Currently in Pain: Yes          Orientation  Orientation  Overall Orientation Status: Within Normal Limits  Social/Functional History  Social/Functional History  Type of Home: Assisted living  Home Layout: One level  Home Access: Level entry  Bathroom Shower/Tub: Walk-in shower  Bathroom Toilet: Standard  Bathroom Equipment: Grab bars in shower, Grab bars around toilet, Built-in shower seat  Bathroom Accessibility: Accessible  Home Equipment: 4 wheeled walker, Aleyda Jolly Help From: Family, Personal care attendant  ADL Assistance: Independent  Homemaking Assistance: (walks to [facility] for meals)  Homemaking Responsibilities: No  Ambulation Assistance: Independent(with rollator)  Transfer Assistance: Independent  Additional Comments: no falls in last 6 months    Objective          AROM RLE (degrees)  RLE AROM: WFL  AROM LLE (degrees)  LLE AROM : WFL  Strength RLE  Strength RLE: WFL(grossly 4+/5)  Strength LLE  Strength LLE: WFL(grossly 4+/5)     Sensation  Overall Sensation Status: WFL  Bed mobility  Rolling to Right: Stand by assistance  Supine to Sit: Stand by assistance  Transfers  Sit to Stand: Stand by assistance(to RW)  Stand to sit: Stand by assistance(with RW, min vc's for RW management and hand placement)  Ambulation  Ambulation?: Yes  Ambulation 1  Surface: level tile  Device: Rolling Walker  Assistance: Stand by assistance(x1)  Quality of Gait: decreased som and step length bilaterally  Distance: 12 feet to bedside chair     Balance  Sitting - Static: Good  Sitting - Dynamic: Good  Standing - Static: Good(with RW)  Standing - Dynamic: Good(with RW)        Plan   Plan  Times per week: 1-2  Times per day: Daily  Current Treatment Recommendations: Strengthening, Gait Training, Balance Training, Functional Mobility Training, Endurance Training, Transfer Training, Home Exercise Program, Safety Education & Training  Safety Devices  Type of devices: All fall risk precautions in place, Call light within reach, Chair alarm in place, Gait belt, Patient at risk for falls, Left in chair              AM-PAC Score  AM-PAC Inpatient Mobility Raw Score : 21 (06/20/19 1425)  AM-PAC Inpatient T-Scale Score : 50.25 (06/20/19 1425)  Mobility Inpatient CMS 0-100% Score: 28.97 (06/20/19 1425)  Mobility Inpatient CMS G-Code Modifier : Saul Serafin (06/20/19 1425)          Goals  Short term goals  Time Frame for Short term goals: to be met prior to acute discharge  Short term goal 1: Pt will perform all bed mobility with mod I. Short term goal 2: Pt will perform sit to stand mod I with least restrictive AD  Short term goal 3: Pt will transfer bed to chair mod I with least restricitve AD  Short term goal 4: Pt will amb 50 feet with least restricitive AD to allow safe mobility in home  Patient Goals   Patient goals : to leave hospital       Therapy Time   Individual Concurrent Group Co-treatment   Time In 1432         Time Out 1455         Minutes 23         Timed Code Treatment Minutes: [address], [facility]  Navin Duff PT    Therapist observed and directed the above evaluation.  Thanks, Navin Duff PT, DPT [id number]  Thanks, Anthony Stark, DPT [id number]

## 2019-06-20 NOTE — PROGRESS NOTES
Occupational Therapy   Occupational Therapy Initial Assessment  Date: 2019   Patient Name: Jerald Motta  MRN: [id number]     : 1940    Date of Service: 2019    Discharge Recommendations: Jerald Motta scored a 21/24 on the -[facility] ADL Inpatient form. At this time, no further OT is recommended upon discharge due to patient near baseline. Recommend patient returns to prior setting with prior services. OT Equipment Recommendations  Equipment Needed: No    Assessment   Performance deficits / Impairments: Decreased functional mobility ; Decreased endurance;Decreased ADL status  Assessment: Pt slightly below baseline level of occupational performance. Would benefit from continued OT during hospital stay to facilitate independence/safety with ADL at d/c  Treatment Diagnosis: Above deficits associated with UTI  Prognosis: Good  Decision Making: Low Complexity  Exam: as above  Patient Education: Role of OT, POC, discharge, eval  REQUIRES OT FOLLOW UP: Yes  Activity Tolerance  Activity Tolerance: Patient Tolerated treatment well  Safety Devices  Safety Devices in place: Yes  Type of devices: Left in chair;Call light within reach; Chair alarm in place;Nurse notified;Gait belt; All fall risk precautions in place           Patient Diagnosis(es): The primary encounter diagnosis was Confusion. Diagnoses of Acute urinary tract infection, Sinus tachycardia, Rigors, and Septicemia ([address] were also pertinent to this visit.      has a past medical history of Abdominal aneurysm ([address] Anemia, Anesthesia complication, Aortic aneurysm ([address] Arthritis, At risk for falls, Ataxia, Avascular necrosis ([address] Back pain, Bipolar 1 disorder ([address] Blood transfusion, CAD (coronary artery disease), Chronic kidney disease, Clostridium difficile infection, Colitis due to Clostridium difficile, Confusion, COPD (chronic obstructive pulmonary disease) ([address] Dementia, Depression, Diabetes ([address] Diabetes mellitus ([address] Diarrhea, Dysuria, Fracture of right acetabulum (HCC), GERD (gastroesophageal reflux disease), Gout, H/O migraine, Hyperlipidemia, Hypertension, Liver disease, Major depressive disorder, Neuropathy, Other disorders of kidney and ureter, Pneumonia, Prolonged emergence from general anesthesia, Shoulder (girdle) dystocia during labor and deliver, delivered, Type II or unspecified type diabetes mellitus without mention of complication, not stated as uncontrolled, UTI (urinary tract infection), Vitamin D deficiency, Weakness, and Wrist fracture. has a past surgical history that includes Kidney stone surgery; Hysterectomy; Tonsillectomy; back surgery; Carotid endarterectomy (5/16/11); Colonoscopy; Abdomen surgery; fracture surgery; Appendectomy (1960); Cholecystectomy; vascular surgery; eye surgery; ERCP (2-1-2013); Abdominal aortic aneurysm repair (8/5/2013); Upper gastrointestinal endoscopy (4-28-14); Wrist surgery (Right, 2/12/16); joint replacement; joint replacement (Left, 09/28/2016); Endoscopy, colon, diagnostic; shoulder surgery (Right, 05/02/2017); and pr total knee arthroplasty (Right, 10/23/2018). Treatment Diagnosis: Above deficits associated with UTI      Restrictions  Restrictions/Precautions  Restrictions/Precautions: Fall Risk(high fall risk)  Position Activity Restriction  Other position/activity restrictions: Philbert Spurling is a 70 y.o. female that presents with the above hallucinations noted. Patient in the emergency department is pleasant but does complain of the hallucinations of denies them now. She denies chest pain. She denies dysuria. No fevers chills nausea vomiting diarrhea or constipation. Review of systems other than hallucinations is negative. Found to be septic with UTI    Subjective   General  Chart Reviewed: Yes  Diagnosis: UTI  Subjective  Subjective: Pt supine in bed at time of therapist arrival, getting lunch tray set up with RN.  Pt agreeable with evaluation and getting into chair for lunch.   Pain Assessment  Pain Assessment: 0-10  Pain Level: 3     Social/Functional History  Social/Functional History  Type of Home: Assisted living  Home Layout: One level  Home Access: Level entry  Bathroom Shower/Tub: Walk-in shower  Bathroom Toilet: Standard  Bathroom Equipment: Grab bars in shower, Grab bars around toilet, Built-in shower seat  Bathroom Accessibility: Accessible  Home Equipment: 4 wheeled walker, [address] Help From: Family, Personal care attendant  ADL Assistance: Independent  Homemaking Assistance: (walks to [facility] for meals)  Homemaking Responsibilities: No  Ambulation Assistance: Independent(with rollator)  Transfer Assistance: Independent  Additional Comments: no falls in last 6 months       Objective   Vision: Impaired  Vision Exceptions: Wears glasses for reading  Hearing: Within functional limits    Orientation  Overall Orientation Status: Within Functional Limits     Balance  Sitting Balance: Supervision  Standing Balance: Stand by assistance  Functional Mobility  Functional - Mobility Device: Rolling Walker  Activity: Other  Assist Level: Stand by assistance  Functional Mobility Comments: EOB > around bed > recliner ~9' total  ADL  LE Dressing: Minimal assistance(donning B socks long sit in bed)  Tone RUE  RUE Tone: Normotonic  Tone LUE  LUE Tone: Normotonic  Coordination  Movements Are Fluid And Coordinated: Yes     Bed mobility  Rolling to Right: Stand by assistance  Supine to Sit: Stand by assistance  Scooting: Stand by assistance  Transfers  Sit to stand: Stand by assistance(x1, from EOB)  Stand to sit: Stand by assistance(x1, to recliner)     Cognition  Overall Cognitive Status: WFL        Sensation  Overall Sensation Status: WFL        LUE AROM (degrees)  LUE AROM : WFL  RUE AROM (degrees)  RUE AROM : WFL  LUE Strength  Gross LUE Strength: WFL  L Hand General: 4+/5  RUE Strength  Gross RUE Strength: WFL  R Hand General: 4+/5         Plan   Plan  Times per week: 1-2  Times per day: Daily  Current Treatment Recommendations: Functional Mobility Training, Endurance Training, Safety Education & Training, Self-Care / ADL, Equipment Evaluation, Education, & procurement    G-Code     OutComes Score                                                  AM-PAC Score        AM-PAC Inpatient Daily Activity Raw Score: 21 (06/20/19 1457)  AM-PAC Inpatient ADL T-Scale Score : 44.27 (06/20/19 1457)  ADL Inpatient CMS 0-100% Score: 32.79 (06/20/19 1457)  ADL Inpatient CMS G-Code Modifier : Nirav Cadena (06/20/19 1457)    Goals  Short term goals  Time Frame for Short term goals: discharge  Short term goal 1: Fxl mob mod I  Short term goal 2: Fxl transfers mod I  Short term goal 3: UB/LB dressing mod I  Short term goal 4: UB/LB bathing mod I  Long term goals  Time Frame for Long term goals : LTG=STG       Therapy Time   Individual Concurrent Group Co-treatment   Time In 1332         Time Out 1355         Minutes 23            Timed Code Treatment Minutes:  8 Minutes    Total Treatment Minutes:  23 minutes    Fazal Boggs Mai Marble OTR/L [id number]    Ambrose Logan OT

## 2019-06-21 VITALS
HEART RATE: 60 BPM | OXYGEN SATURATION: 99 % | TEMPERATURE: 97 F | SYSTOLIC BLOOD PRESSURE: 126 MMHG | HEIGHT: 62 IN | RESPIRATION RATE: 18 BRPM | WEIGHT: 139.77 LBS | BODY MASS INDEX: 25.72 KG/M2 | DIASTOLIC BLOOD PRESSURE: 50 MMHG

## 2019-06-21 LAB
ANION GAP SERPL CALCULATED.3IONS-SCNC: 9 MMOL/L (ref 3–16)
BUN BLDV-MCNC: 12 MG/DL (ref 7–20)
CALCIUM SERPL-MCNC: 8.7 MG/DL (ref 8.3–10.6)
CHLORIDE BLD-SCNC: 103 MMOL/L (ref 99–110)
CO2: 26 MMOL/L (ref 21–32)
CREAT SERPL-MCNC: 0.6 MG/DL (ref 0.6–1.2)
GFR AFRICAN AMERICAN: >60
GFR NON-AFRICAN AMERICAN: >60
GLUCOSE BLD-MCNC: 107 MG/DL (ref 70–99)
GLUCOSE BLD-MCNC: 112 MG/DL (ref 70–99)
GLUCOSE BLD-MCNC: 119 MG/DL (ref 70–99)
GLUCOSE BLD-MCNC: 125 MG/DL (ref 70–99)
HCT VFR BLD CALC: 33.6 % (ref 36–48)
HEMOGLOBIN: 11.5 G/DL (ref 12–16)
MAGNESIUM: 1.8 MG/DL (ref 1.8–2.4)
MCH RBC QN AUTO: 36.7 PG (ref 26–34)
MCHC RBC AUTO-ENTMCNC: 34.2 G/DL (ref 31–36)
MCV RBC AUTO: 107.3 FL (ref 80–100)
ORGANISM: ABNORMAL
PDW BLD-RTO: 13.6 % (ref 12.4–15.4)
PERFORMED ON: ABNORMAL
PLATELET # BLD: 146 K/UL (ref 135–450)
PMV BLD AUTO: 7.5 FL (ref 5–10.5)
POTASSIUM SERPL-SCNC: 3.7 MMOL/L (ref 3.5–5.1)
RBC # BLD: 3.13 M/UL (ref 4–5.2)
SODIUM BLD-SCNC: 138 MMOL/L (ref 136–145)
URINE CULTURE, ROUTINE: ABNORMAL
URINE CULTURE, ROUTINE: ABNORMAL
WBC # BLD: 3.7 K/UL (ref 4–11)

## 2019-06-21 PROCEDURE — 99231 SBSQ HOSP IP/OBS SF/LOW 25: CPT | Performed by: INTERNAL MEDICINE

## 2019-06-21 PROCEDURE — 83735 ASSAY OF MAGNESIUM: CPT

## 2019-06-21 PROCEDURE — 85027 COMPLETE CBC AUTOMATED: CPT

## 2019-06-21 PROCEDURE — 97116 GAIT TRAINING THERAPY: CPT

## 2019-06-21 PROCEDURE — 6360000002 HC RX W HCPCS: Performed by: INTERNAL MEDICINE

## 2019-06-21 PROCEDURE — 80048 BASIC METABOLIC PNL TOTAL CA: CPT

## 2019-06-21 PROCEDURE — 2580000003 HC RX 258: Performed by: INTERNAL MEDICINE

## 2019-06-21 PROCEDURE — 6370000000 HC RX 637 (ALT 250 FOR IP): Performed by: INTERNAL MEDICINE

## 2019-06-21 PROCEDURE — 94760 N-INVAS EAR/PLS OXIMETRY 1: CPT

## 2019-06-21 PROCEDURE — 2700000000 HC OXYGEN THERAPY PER DAY

## 2019-06-21 RX ORDER — CIPROFLOXACIN 500 MG/1
500 TABLET, FILM COATED ORAL EVERY 12 HOURS SCHEDULED
Status: DISCONTINUED | OUTPATIENT
Start: 2019-06-21 | End: 2019-06-21 | Stop reason: HOSPADM

## 2019-06-21 RX ORDER — IPRATROPIUM BROMIDE AND ALBUTEROL SULFATE 2.5; .5 MG/3ML; MG/3ML
1 SOLUTION RESPIRATORY (INHALATION) EVERY 4 HOURS PRN
Qty: 360 ML | Refills: 0 | Status: SHIPPED | OUTPATIENT
Start: 2019-06-21 | End: 2019-11-21 | Stop reason: ALTCHOICE

## 2019-06-21 RX ORDER — OXYCODONE HYDROCHLORIDE AND ACETAMINOPHEN 5; 325 MG/1; MG/1
1 TABLET ORAL EVERY 6 HOURS PRN
Qty: 15 TABLET | Refills: 0 | Status: SHIPPED | OUTPATIENT
Start: 2019-06-21 | End: 2019-06-24

## 2019-06-21 RX ORDER — OXYMETAZOLINE HYDROCHLORIDE 0.05 G/100ML
2 SPRAY NASAL 2 TIMES DAILY
Refills: 3 | COMMUNITY
Start: 2019-06-21 | End: 2019-07-21

## 2019-06-21 RX ORDER — CIPROFLOXACIN 500 MG/1
500 TABLET, FILM COATED ORAL EVERY 12 HOURS SCHEDULED
Qty: 20 TABLET | Refills: 0 | Status: SHIPPED | OUTPATIENT
Start: 2019-06-21 | End: 2019-07-01

## 2019-06-21 RX ADMIN — MIDODRINE HYDROCHLORIDE 10 MG: 5 TABLET ORAL at 09:37

## 2019-06-21 RX ADMIN — MICONAZOLE NITRATE: 20 POWDER TOPICAL at 09:38

## 2019-06-21 RX ADMIN — Medication 1 G: at 01:29

## 2019-06-21 RX ADMIN — PANTOPRAZOLE SODIUM 40 MG: 40 TABLET, DELAYED RELEASE ORAL at 06:36

## 2019-06-21 RX ADMIN — ENOXAPARIN SODIUM 40 MG: 40 INJECTION SUBCUTANEOUS at 09:37

## 2019-06-21 RX ADMIN — MIDODRINE HYDROCHLORIDE 10 MG: 5 TABLET ORAL at 12:51

## 2019-06-21 RX ADMIN — ASPIRIN 81 MG 81 MG: 81 TABLET ORAL at 09:37

## 2019-06-21 RX ADMIN — Medication 10 ML: at 01:29

## 2019-06-21 RX ADMIN — ALLOPURINOL 300 MG: 100 TABLET ORAL at 09:37

## 2019-06-21 RX ADMIN — OXYCODONE HYDROCHLORIDE AND ACETAMINOPHEN 1 TABLET: 5; 325 TABLET ORAL at 10:51

## 2019-06-21 RX ADMIN — MIDODRINE HYDROCHLORIDE 10 MG: 5 TABLET ORAL at 16:49

## 2019-06-21 RX ADMIN — Medication 10 ML: at 09:39

## 2019-06-21 RX ADMIN — OXYCODONE HYDROCHLORIDE AND ACETAMINOPHEN 1 TABLET: 5; 325 TABLET ORAL at 16:50

## 2019-06-21 RX ADMIN — CIPROFLOXACIN HYDROCHLORIDE 500 MG: 500 TABLET, FILM COATED ORAL at 09:37

## 2019-06-21 RX ADMIN — CETIRIZINE HYDROCHLORIDE 5 MG: 10 TABLET, FILM COATED ORAL at 09:37

## 2019-06-21 RX ADMIN — OXYMETAZOLINE HCL 2 SPRAY: 0.05 SPRAY NASAL at 09:38

## 2019-06-21 ASSESSMENT — PAIN SCALES - GENERAL
PAINLEVEL_OUTOF10: 5
PAINLEVEL_OUTOF10: 0
PAINLEVEL_OUTOF10: 5

## 2019-06-21 NOTE — CARE COORDINATION
Discharge planning-    PT/OT do not recommend any additional therapy; PT/OT deem it safe for the patient to return to her apartment in [place] [address] ([place]) at [facility]. Discussed with [facility], who reports that regardless of PT/OT recommendations they usually have their residents spend a few days at the SNF/ rehab unit before return to [facility]. Patient has only has a two-midnight stay; may discharge today. [facility] reports that they can accept at the SNF today, and will bill the patient's Medicare part B benefits. Attempted to discuss with the patient, who is still asleep. Addendum: Message to Dr. Aye Dye via [employer], asking about anticipated discharge date. Awaiting response. [facility], no pre cert needed (does NOT need a three midnight stay, per the SNF).     Will continue to follow for support and discharge planning.    -Dannie Eli, MSW, LSW

## 2019-06-21 NOTE — CARE COORDINATION
Discharge planning-    MD not here yet to discharge the patient. Attempted to reschedule the ride through [employer]- no transports available until midnight. Called [facility], they also have no availability until midnight. [facility] ([phone]), spoke with Matt Moodyer- transport arranged for 5:30pm. ICU team notified. [facility], 5:30pm transport.     Will continue to follow for support and discharge planning.    -Serge Garibay, MSW, LSW

## 2019-06-21 NOTE — PROGRESS NOTES
Pulmonary & Critical Care Medicine ICU Progress Note      ASSESSMENT AND PLAN:    Urinary Tract Infection due to Enterobacter cloacae  · Change Rocephin to Cipro  Chronic Hypotension  · Blood pressure is normal with Midodrine  Allergic Sinusitis and Rhinitis  · Zyrtec  · Afrin nasal spray for 1 more day  Diabetes Mellitus  · Glucose is normal without Metformin  Prophylaxis  · Lovenox for DVT Prophylaxis   Disposition  · OK to discharge from [address] [place] [address]:   No new problems. HOSPITAL VISIT:  She was seen during ICU Rounds this morning. CHIEF COMPLAINT:  None    HISTORY:  No complaints. REVIEW OF SYMPTOMS:  No complaints. IV:   dextrose         Intake/Output Summary (Last 24 hours) at 6/21/2019 1119  Last data filed at 6/21/2019 4505  Gross per 24 hour   Intake 600 ml   Output 1950 ml   Net -1350 ml       MEDICATIONS:  Scheduled Meds:   ciprofloxacin  500 mg Oral 2 times per day    cetirizine  5 mg Oral Daily    oxymetazoline  2 spray Each Nostril BID    allopurinol  300 mg Oral Daily    aspirin  81 mg Oral Daily    atorvastatin  10 mg Oral Nightly    midodrine  10 mg Oral TID WC    sodium chloride flush  10 mL Intravenous 2 times per day    miconazole   Topical BID    enoxaparin  40 mg Subcutaneous Daily    pantoprazole  40 mg Oral QAM AC    lidocaine 1 % injection  5 mL Intradermal Once    sodium chloride flush  10 mL Intravenous 2 times per day    insulin lispro  0-6 Units Subcutaneous TID WC    insulin lispro  0-3 Units Subcutaneous Nightly     PRN Meds:  magnesium sulfate, oxyCODONE-acetaminophen, sodium chloride flush, magnesium hydroxide, ondansetron, acetaminophen, sodium chloride flush, glucose, dextrose, glucagon (rDNA), dextrose      PHYSICAL EXAM:  Vital Signs: BP (!) 126/50   Pulse 60   Temp 97 °F (36.1 °C) (Temporal)   Resp 18   Ht 5' 2\" (1.575 m)   Wt 139 lb 12.4 oz (63.4 kg)   SpO2 99%   BMI 25.56 kg/m²      Gen:   No distress. Breathing comfortably at rest.       LAB RESULTS:  CBC:   Recent Labs     06/19/19  0703 06/20/19  0440 06/21/19  0642   WBC 7.8 5.9 3.7*   HGB 12.0 11.1* 11.5*   HCT 36.1 33.1* 33.6*   .9* 109.2* 107.3*   * 134* 146     CHEMISTRY:   Recent Labs     06/19/19  0703 06/20/19  0440 06/21/19  0642    142 138   K 5.2* 3.9 3.7    108 103   CO2 22 22 26   BUN 13 10 12   CREATININE 0.7 0.6 0.6   GLUCOSE 117* 109* 125*     LIVER PROFILE:   Recent Labs     06/18/19  2226   AST 16   ALT 7*   LIPASE 15.0   BILITOT 0.5   ALKPHOS 73     ABG:   Recent Labs     06/19/19  0615   PHART 7.325*   LXB4TGR 41.2   PO2ART 137.0*       GLUCOSE MANAGEMENT:  Results for [place] (MRN [id number]) as of 6/21/2019 11:23   Ref. Range 6/21/2019 09:41   POC Glucose Latest Ref Range: 70 - 99 mg/dl 119 (H)         CULTURES:  Blood Cultures 6/19/19:  No growth so far   Urine Culture 6/18/19:  Enterobacter cloacae      CHEST XRAY 6/19/19:  AP portable view of the chest time stamped at 0014 hours demonstrates normal   cardiac size.  Aortic arch is calcified.  No vascular congestion, focal   consolidation, effusion, or pneumothorax is noted.  Stable scar is present at   the left base.  Bilateral shoulder arthroplasties are noted.  Osseous   structures are stable.  Mediastinal contours are unremarkable. ECHOCARDIOGRAM 4/2/19:   -Normal left ventricle size, wall thickness, and systolic function with an   estimated ejection fraction of 55-60%.  -No regional wall motion abnormalities are seen.   -Aortic valve appears sclerotic but opens adequately. Mild aortic   regurgitation.   -Thickened mitral valve without evidence of stenosis. There is moderate   mitral regurgitation noted.   -There is mild tricuspid regurgitation with a RVSP estimation of 35 mmHg.   -Mild pulmonic regurgitation present.   -There is trivial to mild circumferential pericardial effusion noted.   -Normal diastolic function.  Avg. E/e'=12.05

## 2019-06-21 NOTE — CARE COORDINATION
Discharge planning-    Per Dr. Yasmany Bronson, patient will discharge today. Called [facility], spoke with Emma Galvez- transport arranged for 3:30pm. ICU team notified. [facility]/ [facility] notified. Patient notified at the bedside, who identifies no other needs at this time. HENS submitted. Awaiting completion of the [address]. Addendum: Spoke with [facility]/ [facility], who requests that this worker submit for a Level of Care (LOC), reports that it is ok to discharge the patient is not approved by the end of the day. Will submit once the [address] is signed by the MD.    [facility], 3:30pm transport.     Will continue to follow for support and discharge planning.    -Maryjane German, MSW, LSW

## 2019-06-21 NOTE — PROGRESS NOTES
Shift assessment completed, see flow sheet. Morning medications passed see MAR. Pt is A&O X4 and VSS and afebrile. Lung sounds are clear in the upper lobes and diminished in the lower lobes. Bowel sounds are active, pt refused breakfast but did have coffee. Pedal pulses are palpable. PT/OT to work with pt. Message sent to  to updated that pt is at baseline. Will continue to monitor.

## 2019-06-21 NOTE — PROGRESS NOTES
Nephrology Attending  Progress Note        SUMMARY :  We are following this patient for EFRAÍN, Hypotension    past medical history of CAD, remote h/o Nephrolithiasis, COPD, DM II, HTN, AAA s/p repair, Bipolar disorder, Dementia, GERD, Gout, HPL, NAT/CEA,       SUBJECTIVE:   Patient progress reviewed.  The patient feels weak , but better  Has anorexia     Physical Exam:    VITALS:  BP (!) 126/50   Pulse 60   Temp 97 °F (36.1 °C) (Temporal)   Resp 18   Ht 5' 2\" (1.575 m)   Wt 139 lb 12.4 oz (63.4 kg)   SpO2 99%   BMI 25.56 kg/m²   BLOOD PRESSURE RANGE:  Systolic (44OBX), IDB:285 , Min:115 , IRIS:349   ; Diastolic (82KQM), MJE:56, Min:50, Max:80    24HR INTAKE/OUTPUT:      Intake/Output Summary (Last 24 hours) at 6/21/2019 1129  Last data filed at 6/21/2019 7592  Gross per 24 hour   Intake 600 ml   Output 1950 ml   Net -1350 ml       Constitutional:  Alert, oriented x 3  PERRL , EOM +  Pallor +  Neck scar  B/ L scars shoulders, knees   Cardiovascular :  RRR, No murmur/ rub   Respiratory:  B/ L air entry, normal breath sounds  Gastrointestinal:  Soft, bowel sounds +, no organomegaly   Other:  No rash  No focal neurological deficit    DATA:    CBC with Differential:    Lab Results   Component Value Date    WBC 3.7 06/21/2019    RBC 3.13 06/21/2019    HGB 11.5 06/21/2019    HCT 33.6 06/21/2019     06/21/2019    .3 06/21/2019    MCH 36.7 06/21/2019    MCHC 34.2 06/21/2019    RDW 13.6 06/21/2019    SEGSPCT 46.0 01/21/2013    BANDSPCT 24 04/27/2014    METASPCT 3 04/27/2014    LYMPHOPCT 18.8 06/20/2019    MONOPCT 8.5 06/20/2019    MYELOPCT 1.0 10/15/2011    EOSPCT 2.0 10/18/2011    BASOPCT 0.1 06/20/2019    MONOSABS 0.5 06/20/2019    LYMPHSABS 1.1 06/20/2019    EOSABS 0.0 06/20/2019    BASOSABS 0.0 06/20/2019    DIFFTYPE Manual 01/21/2013     CMP:    Lab Results   Component Value Date     06/21/2019    K 3.7 06/21/2019    K 3.9 06/20/2019     06/21/2019    CO2 26 06/21/2019    BUN 12 06/21/2019 CREATININE 0.6 06/21/2019    GFRAA >60 06/21/2019    GFRAA >60 02/04/2013    AGRATIO 1.4 06/18/2019    LABGLOM >60 06/21/2019    GLUCOSE 125 06/21/2019    PROT 7.6 06/18/2019    PROT 7.0 02/04/2013    LABALBU 4.4 06/18/2019    CALCIUM 8.7 06/21/2019    BILITOT 0.5 06/18/2019    ALKPHOS 73 06/18/2019    AST 16 06/18/2019    ALT 7 06/18/2019     Magnesium:    Lab Results   Component Value Date    MG 1.80 06/21/2019     Phosphorus:    Lab Results   Component Value Date    PHOS 3.6 09/15/2014     Uric Acid:  No results found for: LABURIC, URICACID  Last 3 Troponin:    Lab Results   Component Value Date    TROPONINI <0.01 06/19/2019    TROPONINI <0.01 06/18/2019    TROPONINI <0.01 10/26/2018     U/A:    Lab Results   Component Value Date    COLORU YELLOW 06/18/2019    PROTEINU 30 06/18/2019    PHUR 5.5 06/18/2019    LABCAST 3-5 Hyaline 04/21/2019    WBCUA 54 06/18/2019    RBCUA 11 06/18/2019    MUCUS 3+ 10/02/2011    BACTERIA 4+ 06/18/2019    CLARITYU CLOUDY 06/18/2019    SPECGRAV 1.021 06/18/2019    LEUKOCYTESUR MODERATE 06/18/2019    UROBILINOGEN 0.2 06/18/2019    BILIRUBINUR Negative 06/18/2019    BILIRUBINUR NEGATIVE 03/10/2012    BLOODU MODERATE 06/18/2019    GLUCOSEU Negative 06/18/2019    GLUCOSEU NEGATIVE 03/10/2012    AMORPHOUS 2+ 11/08/2014         IMPRESSION/RECOMMENDATIONS:      Diagnosis and Plan     1. EFRAÍN  Recovered   2. Chronic Hypotension  Off levophed   On midodrine   3.  UTI with sepsis   Enterococcus cloacae     Discontinue Edgar     Norman Marshallton

## 2019-06-21 NOTE — PROGRESS NOTES
Patient sleeping quietly in bed. No S/S of distress noted. Will monitor. Bedside table, phone and call light within reach.

## 2019-06-21 NOTE — PROGRESS NOTES
Pt up in the chair with PT/OT eating lunch. Denies pain and any other needs.  Will continue to monitor

## 2019-06-21 NOTE — PROGRESS NOTES
Hospitalist Progress Note      PCP: Karen Craig    Date of Admission: 6/18/2019    Chief Complaint: Confusion    Hospital Course: Patient admitted with encephalopathy and UTI. Septic shock present on admission. Treating with IV fluids and antibiotics. Clinically  improving. Subjective: Doing well. No new complaints. Medications:  Reviewed    Infusion Medications    dextrose       Scheduled Medications    cetirizine  5 mg Oral Daily    oxymetazoline  2 spray Each Nostril BID    cefTRIAXone (ROCEPHIN) IV  1 g Intravenous Q24H    allopurinol  300 mg Oral Daily    aspirin  81 mg Oral Daily    atorvastatin  10 mg Oral Nightly    midodrine  10 mg Oral TID WC    sodium chloride flush  10 mL Intravenous 2 times per day    miconazole   Topical BID    enoxaparin  40 mg Subcutaneous Daily    pantoprazole  40 mg Oral QAM AC    lidocaine 1 % injection  5 mL Intradermal Once    sodium chloride flush  10 mL Intravenous 2 times per day    insulin lispro  0-6 Units Subcutaneous TID WC    insulin lispro  0-3 Units Subcutaneous Nightly     PRN Meds: magnesium sulfate, oxyCODONE-acetaminophen, sodium chloride flush, magnesium hydroxide, ondansetron, acetaminophen, sodium chloride flush, glucose, dextrose, glucagon (rDNA), dextrose      Intake/Output Summary (Last 24 hours) at 6/20/2019 2011  Last data filed at 6/20/2019 1929  Gross per 24 hour   Intake 2559 ml   Output 2850 ml   Net -291 ml       Physical Exam:    /60   Pulse 68   Temp 98.2 °F (36.8 °C) (Temporal)   Resp 11   Ht 5' 2\" (1.575 m)   Wt 145 lb 4.5 oz (65.9 kg)   SpO2 99%   BMI 26.57 kg/m²     General appearance: No apparent distress, appears stated age and cooperative. HEENT: Pupils equal, round, and reactive to light. Conjunctivae/corneas clear. Neck: Supple, with full range of motion. No jugular venous distention. Trachea midline. Respiratory:  Normal respiratory effort.  Clear to auscultation, bilaterally without Rales/Wheezes/Rhonchi. Cardiovascular: Regular rate and rhythm with normal S1/S2 without murmurs, rubs or gallops. Abdomen: Soft, non-tender, non-distended with normal bowel sounds. Musculoskeletal: No clubbing, cyanosis or edema bilaterally. Full range of motion without deformity. Skin: Skin color, texture, turgor normal.  No rashes or lesions. Neurologic:  Neurovascularly intact without any focal sensory/motor deficits. Cranial nerves: II-XII intact, grossly non-focal.  Psychiatric: Alert and oriented, thought content appropriate, normal insight  Capillary Refill: Brisk,< 3 seconds   Peripheral Pulses: +2 palpable, equal bilaterally       Labs:   Recent Labs     06/18/19 2226 06/19/19  0703 06/20/19  0440   WBC 7.0 7.8 5.9   HGB 12.7 12.0 11.1*   HCT 38.1 36.1 33.1*    131* 134*     Recent Labs     06/18/19 2226 06/19/19  0703 06/20/19  0440    139 142   K 5.1 5.2* 3.9    107 108   CO2 20* 22 22   BUN 18 13 10   CREATININE 0.7 0.7 0.6   CALCIUM 9.0 7.7* 7.6*     Recent Labs     06/18/19  2226   AST 16   ALT 7*   BILITOT 0.5   ALKPHOS 73     No results for input(s): INR in the last 72 hours. Recent Labs     06/18/19 2226 06/19/19  0703   TROPONINI <0.01 <0.01       Urinalysis:      Lab Results   Component Value Date    NITRU POSITIVE 06/18/2019    WBCUA 54 06/18/2019    BACTERIA 4+ 06/18/2019    RBCUA 11 06/18/2019    BLOODU MODERATE 06/18/2019    SPECGRAV 1.021 06/18/2019    GLUCOSEU Negative 06/18/2019    GLUCOSEU NEGATIVE 03/10/2012       Radiology:  XR CHEST PORTABLE   Final Result   AP portable view of the chest time stamped at 0014 hours demonstrates normal   cardiac size. Aortic arch is calcified. No vascular congestion, focal   consolidation, effusion, or pneumothorax is noted. Stable scar is present at   the left base. Bilateral shoulder arthroplasties are noted. Osseous   structures are stable. Mediastinal contours are unremarkable.       RECOMMENDATION:   No acute cardiopulmonary process. CT Head WO Contrast   Final Result   No acute intracranial abnormality. Senescent changes including chronic   microvascular change. Chronic deformity left nasal bone. Assessment/Plan:    Active Hospital Problems    Diagnosis    Allergic sinusitis [J30.9]    UTI (urinary tract infection) [N39.0]    Septic shock ([facility] [A41.9, R65.21]    Dementia [F03.90]    Acute encephalopathy [G93.40]    Chronic hypotension [I95.89]    Bipolar disorder ([facility] [F31.9]    DM2 (diabetes mellitus, type 2) ([facility] [E11.9]    Severe sepsis ([facility] [A41.9, R65.20]     1. Septic shock: Treated with IV fluids and pressors. Off pressors for more than 24 hours. Resolved. 2.  UTI: Continue antibiotics. Await urine cultures. Blood cultures negative so far. 3. Chronic hypotension - extensively evaluated by nephrology 10/2018. Continue midodrine. 4. DM II - SSI   5. acute metabolic: Secondary to septic shock and UTI. Resolved. At baseline now. 5. Bipolar disorder - continue psych meds per home dose   6. Dementia - at baseline    DVT Prophylaxis: lovenox ppx   Diet: DIET GENERAL;  Code Status: Full Code    PT/OT Eval Status: order     Dispo - transfer to floor today. Dr Julia Farias to assume care tomorrow.  Possibly can discharge tomorrow     Earnest Lowe MD

## 2019-06-21 NOTE — PROGRESS NOTES
Physical Therapy  Facility/Department: [facility] ICU  Daily Treatment Note  NAME: Laila Ocasio  : 1940  MRN: [id number]    Date of Service: 2019    Discharge Recommendations:Patricia Marvin scored a  on the AM-PAC short mobility form. At this time, no further PT is recommended upon discharge due to presenting close to baseline of function and anticipated return to prior level. Recommend patient returns to prior setting with prior services. PT Equipment Recommendations  Equipment Needed: No    Assessment   Body structures, Functions, Activity limitations: Decreased functional mobility ; Decreased strength;Decreased balance;Decreased safe awareness  Assessment: Pt presents with mild generalized LE weakness limiting ambulation endurance to some degree. Pt will continue to benefit from skilled PT during acute care stay to return to [facility]. Treatment Diagnosis: Generalized weakness  Prognosis: Good  History: UTI, hallucinations leading to admission  Patient Education: Pt educated on reason for acute care PT, discharge recommendations, and proper RW managment. REQUIRES PT FOLLOW UP: Yes  Activity Tolerance  Activity Tolerance: Patient Tolerated treatment well     Patient Diagnosis(es): The primary encounter diagnosis was Confusion. Diagnoses of Acute urinary tract infection, Sinus tachycardia, Rigors, and Septicemia ([address] were also pertinent to this visit.      has a past medical history of Abdominal aneurysm ([address] Anemia, Anesthesia complication, Aortic aneurysm ([address] Arthritis, At risk for falls, Ataxia, Avascular necrosis ([address] Back pain, Bipolar 1 disorder ([address] Blood transfusion, CAD (coronary artery disease), Chronic kidney disease, Clostridium difficile infection, Colitis due to Clostridium difficile, Confusion, COPD (chronic obstructive pulmonary disease) ([address] Dementia, Depression, Diabetes ([address] Diabetes mellitus ([address] Diarrhea, Dysuria, Fracture of right acetabulum ([address] GERD (gastroesophageal reflux disease), Gout, H/O migraine, Hyperlipidemia, Hypertension, Liver disease, Major depressive disorder, Neuropathy, Other disorders of kidney and ureter, Pneumonia, Prolonged emergence from general anesthesia, Shoulder (girdle) dystocia during labor and deliver, delivered, Type II or unspecified type diabetes mellitus without mention of complication, not stated as uncontrolled, UTI (urinary tract infection), Vitamin D deficiency, Weakness, and Wrist fracture. has a past surgical history that includes Kidney stone surgery; Hysterectomy; Tonsillectomy; back surgery; Carotid endarterectomy (5/16/11); Colonoscopy; Abdomen surgery; fracture surgery; Appendectomy (1960); Cholecystectomy; vascular surgery; eye surgery; ERCP (2-1-2013); Abdominal aortic aneurysm repair (8/5/2013); Upper gastrointestinal endoscopy (4-28-14); Wrist surgery (Right, 2/12/16); joint replacement; joint replacement (Left, 09/28/2016); Endoscopy, colon, diagnostic; shoulder surgery (Right, 05/02/2017); and pr total knee arthroplasty (Right, 10/23/2018). Restrictions  Restrictions/Precautions  Restrictions/Precautions: Fall Risk(high fall risk)  Position Activity Restriction  Other position/activity restrictions: Henrik Zavala is a 70 y.o. female that presents with the above hallucinations noted. Patient in the emergency department is pleasant but does complain of the hallucinations of denies them now. She denies chest pain. She denies dysuria. No fevers chills nausea vomiting diarrhea or constipation. Review of systems other than hallucinations is negative. Found to be septic with UTI  Subjective   General  Chart Reviewed: Yes  Family / Caregiver Present: No  Subjective  Subjective: Pt denies pain and is agreeable to PT treatment on this date.    General Comment  Comments: Pt supine in bed upon arrival.   Pain Screening  Patient Currently in Pain: Denies  Vital Signs  Patient Currently in Pain: Denies Orientation  Orientation  Overall Orientation Status: Within Normal Limits     Objective   Bed mobility  Rolling to Right: Stand by assistance  Supine to Sit: Stand by assistance  Scooting: Stand by assistance  Transfers  Sit to Stand: Stand by assistance  Stand to sit: Stand by assistance  Comment: VCs for sequencing and hand placement. Ambulation  Ambulation?: Yes  Ambulation 1  Surface: level tile  Device: Rolling Walker  Assistance: Stand by assistance  Quality of Gait: Decreased gait speed and stride length. Minimal heel strike noted. Distance: 61'  Comments: No SOB or dizziness noted. Weakness in legs limiting factor in ambulation. BP taken after ambulation: 146/69. Balance  Posture: Good  Sitting - Static: Good  Sitting - Dynamic: Good  Standing - Static: Good  Standing - Dynamic: Good      AM-PAC Score  AM-PAC Inpatient Mobility Raw Score : 21 (06/21/19 1151)  AM-PAC Inpatient T-Scale Score : 50.25 (06/21/19 1151)  Mobility Inpatient CMS 0-100% Score: 28.97 (06/21/19 1151)  Mobility Inpatient CMS G-Code Modifier : CJ (06/21/19 1151)          Goals  Short term goals  Time Frame for Short term goals: to be met prior to acute discharge  Short term goal 1: Pt will perform all bed mobility with mod I. Short term goal 2: Pt will perform sit to stand mod I with least restrictive AD  Short term goal 3: Pt will transfer bed to chair mod I with least restricitve AD  Short term goal 4: Pt will amb 50 feet with least restricitive AD to allow safe mobility in home - MET 6/21  Short term goal 5: Pt will ambulate 100' with LRAD with supervision  Patient Goals   Patient goals : to leave hospital   Goal 4 met on this date and updated accordingly.      Plan    Plan  Times per week: 1-2  Times per day: Daily  Current Treatment Recommendations: Strengthening, Gait Training, Balance Training, Functional Mobility Training, Endurance Training, Transfer Training, Home Exercise Program, Safety Education & Training  Safety Devices  Type of devices:  All fall risk precautions in place, Call light within reach, Chair alarm in place, Gait belt, Patient at risk for falls, Left in chair, Nurse notified     Therapy Time   Individual Concurrent Group Co-treatment   Time In 1114         Time Out 1129         Minutes 15         Timed Code Treatment Minutes: [address], [facility]    This treatment was observed and supervised by David Torres, [address] [place] [address]

## 2019-06-21 NOTE — PROGRESS NOTES
VSS see flow sheet. Am protonix administered per order. See emar. Denies any other needs at this time. Will monitor. Bedside table, phone and call light within reach. Bed alarm engaged.

## 2019-06-22 NOTE — CONSULTS
[address]                     [facility], [address]                                  CONSULTATION    PATIENT NAME: Henrietta Ramos                    :        1940  MED REC NO:   [id number]                          ROOM:       3079  ACCOUNT NO:   [de-identified]                           ADMIT DATE: 2019  PROVIDER:     Bal Mitchell MD    NEPHROLOGY CONSULTATION    CONSULT DATE:  2019    REASON FOR CONSULTATION:  Acute renal failure secondary to sepsis. HISTORY OF PRESENT ILLNESS:  This 70-year-old  female was  brought to the ER from [facility]. EMS reported that the  patient had been continuously calling the dispatch for two days saying  that there were people living in her assisted living apartment. When  she was seen in the ER, she was found to be confused. The symptoms had  apparently developed over today and then she became unresponsive. She  was given IV fluids, her systolic blood pressure was noticed to be in  the low 70s. The patient has history of chronic hypotension and she is on midodrine,  bipolar disorder, dementia and diabetes mellitus type 2. She was in the  hospital last year for right knee replacement. The patient has some  dysuria. There is some gross hematuria. Her renal function which  normally shows a creatinine of less than 1 was elevated with drop in  urine output. REVIEW OF SYSTEMS:  A 10-point review of systems done. Positive  features as in the history of present illness, rest negative. PAST MEDICAL HISTORY:  1.  Bipolar disorder. 2.  Coronary artery disease. 3.  History of acute renal failure. 4.  COPD.  5.  Fracture right hip. 6.  Gout. 7.  History of kidney stones. 8.  Status post hysterectomy, status post tonsillectomy, status post  appendicectomy, status post cholecystectomy.     HOME MEDICATIONS:  Cefuroxime, Percocet, Risperdal, magnesium oxide,  potassium chloride,

## 2019-06-22 NOTE — CARE COORDINATION
Discharge planning-    MIRIAM was not signed until 5:14pm last night. Level of Care (LOC) request submitted via fax.     -Jannette Mayer, MSW, LSW

## 2019-06-22 NOTE — DISCHARGE SUMMARY
[address]                     [facility], [address]                               DISCHARGE SUMMARY    PATIENT NAME: Anastasia iLzarraga                    :        1940  MED REC NO:   [id number]                          ROOM:       0148  ACCOUNT NO:   [de-identified]                           ADMIT DATE: 2019  PROVIDER:     Bart Calzada MD                  DISCHARGE DATE:  2019    FINAL DIAGNOSES:  1. Acute encephalopathy. 2.  Urinary tract infection. 3.  Altered mental status. 4.  COPD. 5.  Type 2 diabetes mellitus. 6.  Advanced dementia. 7.  Delirium. 8.  Chronic hypotension. 9.  Osteoarthritis. 10.  Macrocytosis with anemia. DISCHARGE MEDICATIONS:  1. Cipro 500 mg p.o. b.i.d.  2.  Micatin powder as directed. 3.  Afrin nasal spray as directed. 4.  Oxycodone 5/325 one every six hours p.r.n.  5.  DuoNeb one every 4 hours p.r.n.  6.  Imodium 2 mg p.o. q.i.d. p.r.n.  7.  Aspirin 81 mg once a day. 8.  Magnesium oxide 400 mg p.o. daily. 9.  Tylenol 650 q. 4 hours p.r.n.  10.  ProAmatine 10 mg p.o. t.i.d.  11.  Fosamax 70 mg one every 7 days. 12.  Lipitor 20 mg once a day. 13.  Lexapro 10 mg once a day. 14.  Metformin 500 mg p.o. b.i.d.  15.  Risperdal 0.25 nightly. 16.  Protonix 40 mg once a day. 17.  Lyrica 75 mg p.o. b.i.d. 18.  Allopurinol 300 mg once a day. HOSPITAL COURSE:  This elderly woman with dementia came to the emergency  room with acute encephalopathy. She was hypotensive, she was admitted  to intensive care unit. Dr. Kumar Giron, from the hospitalist service, took  care of the patient _____ days. Treated with IV hydration. Urine  culture was obtained. The patient was started on IV ceftriaxone. The  patient was given midodrine for chronic hypotension. Diabetes was  managed with continuing metformin and also sliding scale coverage. The  patient also has bipolar disorder and was given Risperdal for the same. Dementia got stabilized. The patient grew Enterobacter that was  sensitive to Cipro. After three days of inpatient care, the patient was  put on oral ciprofloxacin. The patient was discharged in stable  condition. Vital signs are stable. The patient was eating fair. The  patient was given PT, OT and speech evaluation. The patient was  discharged in stable condition.           Haley Guadarrama MD    D: 06/21/2019 17:14:02       T: 06/22/2019 4:31:06     [email or web address]  Job#: [id number]     Doc#: [id number]    CC:  [facility]

## 2019-06-23 LAB — BLOOD CULTURE, ROUTINE: NORMAL

## 2019-06-24 LAB — CULTURE, BLOOD 2: NORMAL

## 2019-06-24 NOTE — PROGRESS NOTES
Physical Therapy  Physical Therapy Discharge Summary    Name: Monica Doty  : 1940    The pt was evaluated by PT on 19 and seen for 1 treatment session prior to DC to ECF on 19 per MD order. The pt's acute therapy goals were:  Short term goals  Time Frame for Short term goals: to be met prior to acute discharge  Short term goal 1: Pt will perform all bed mobility with mod I. Short term goal 2: Pt will perform sit to stand mod I with least restrictive AD  Short term goal 3: Pt will transfer bed to chair mod I with least restricitve AD  Short term goal 4: Pt will amb 50 feet with least restricitive AD to allow safe mobility in home - MET   Short term goal 5: Pt will ambulate 100' with LRAD with supervision       Patient met 1 goals during stay. Number of Refusals:0  Number of Holds: 0  During this hospitalization, the patient was educated on:  Patient Education: Pt educated on reason for acute care PT, discharge recommendations, and proper RW managment. DC pt from PT caseload at this time. Thank you!     [address], [place] [id number]

## 2019-06-25 NOTE — PROGRESS NOTES
Occupational Therapy  Occupational Therapy Discharge Summary    Name: Arianne Hahn  : 1940    The pt was evaluated by OT on  and seen for 1 treatment sessions prior to DC to [facility]      per MD order. The pt's acute therapy goals were:  Short term goals  Time Frame for Short term goals: discharge  Short term goal 1: Fxl mob mod I  Short term goal 2: Fxl transfers mod I  Short term goal 3: UB/LB dressing mod I  Short term goal 4: UB/LB bathing mod I  Long term goals  Time Frame for Long term goals : LTG=STG     Patient met 0 goals during stay. Number of Refusals:0  Number of Holds: 0  During this hospitalization, the patient was educated on:  Patient Education: Role of OT, POC, discharge, eval    DC pt from OT caseload at this time. Thank you!

## 2019-07-19 PROBLEM — N39.0 UTI (URINARY TRACT INFECTION): Status: RESOLVED | Noted: 2019-06-19 | Resolved: 2019-07-19

## 2019-11-08 ENCOUNTER — OFFICE VISIT (OUTPATIENT)
Dept: CARDIOLOGY CLINIC | Age: 79
End: 2019-11-08
Payer: MEDICARE

## 2019-11-08 VITALS
SYSTOLIC BLOOD PRESSURE: 130 MMHG | DIASTOLIC BLOOD PRESSURE: 72 MMHG | HEIGHT: 63 IN | OXYGEN SATURATION: 91 % | HEART RATE: 80 BPM | WEIGHT: 138.12 LBS | BODY MASS INDEX: 24.47 KG/M2

## 2019-11-08 DIAGNOSIS — I95.89 CHRONIC HYPOTENSION: Primary | ICD-10-CM

## 2019-11-08 DIAGNOSIS — E78.5 HYPERLIPIDEMIA, UNSPECIFIED HYPERLIPIDEMIA TYPE: ICD-10-CM

## 2019-11-08 DIAGNOSIS — I34.0 MITRAL VALVE INSUFFICIENCY, UNSPECIFIED ETIOLOGY: ICD-10-CM

## 2019-11-08 DIAGNOSIS — R00.2 PALPITATIONS: ICD-10-CM

## 2019-11-08 PROCEDURE — 99204 OFFICE O/P NEW MOD 45 MIN: CPT | Performed by: INTERNAL MEDICINE

## 2019-11-08 PROCEDURE — 93000 ELECTROCARDIOGRAM COMPLETE: CPT | Performed by: INTERNAL MEDICINE

## 2019-11-21 ENCOUNTER — HOSPITAL ENCOUNTER (OUTPATIENT)
Age: 79
Setting detail: OBSERVATION
Discharge: HOME | End: 2019-11-22
Attending: EMERGENCY MEDICINE | Admitting: INTERNAL MEDICINE
Payer: MEDICARE

## 2019-11-21 ENCOUNTER — APPOINTMENT (OUTPATIENT)
Dept: GENERAL RADIOLOGY | Age: 79
End: 2019-11-21
Payer: MEDICARE

## 2019-11-21 DIAGNOSIS — R11.0 NAUSEA: ICD-10-CM

## 2019-11-21 DIAGNOSIS — R07.9 CHEST PAIN, UNSPECIFIED TYPE: Primary | ICD-10-CM

## 2019-11-21 LAB
ALBUMIN SERPL-MCNC: 4.7 G/DL (ref 3.4–5)
ALP BLD-CCNC: 102 U/L (ref 40–129)
ALT SERPL-CCNC: 15 U/L (ref 10–40)
ANION GAP SERPL CALCULATED.3IONS-SCNC: 15 MMOL/L (ref 3–16)
AST SERPL-CCNC: 20 U/L (ref 15–37)
BASOPHILS ABSOLUTE: 0 K/UL (ref 0–0.2)
BASOPHILS RELATIVE PERCENT: 0.3 %
BILIRUB SERPL-MCNC: 0.4 MG/DL (ref 0–1)
BILIRUBIN DIRECT: <0.2 MG/DL (ref 0–0.3)
BILIRUBIN, INDIRECT: NORMAL MG/DL (ref 0–1)
BUN BLDV-MCNC: 12 MG/DL (ref 7–20)
CALCIUM SERPL-MCNC: 9.2 MG/DL (ref 8.3–10.6)
CHLORIDE BLD-SCNC: 95 MMOL/L (ref 99–110)
CO2: 23 MMOL/L (ref 21–32)
CREAT SERPL-MCNC: 0.7 MG/DL (ref 0.6–1.2)
D DIMER: 5233 NG/ML DDU (ref 0–229)
EOSINOPHILS ABSOLUTE: 0 K/UL (ref 0–0.6)
EOSINOPHILS RELATIVE PERCENT: 0.7 %
GFR AFRICAN AMERICAN: >60
GFR NON-AFRICAN AMERICAN: >60
GLUCOSE BLD-MCNC: 177 MG/DL (ref 70–99)
GLUCOSE BLD-MCNC: 90 MG/DL (ref 70–99)
HCT VFR BLD CALC: 42.7 % (ref 36–48)
HEMOGLOBIN: 14 G/DL (ref 12–16)
LIPASE: 28 U/L (ref 13–60)
LYMPHOCYTES ABSOLUTE: 0.8 K/UL (ref 1–5.1)
LYMPHOCYTES RELATIVE PERCENT: 13.9 %
MCH RBC QN AUTO: 34.4 PG (ref 26–34)
MCHC RBC AUTO-ENTMCNC: 32.9 G/DL (ref 31–36)
MCV RBC AUTO: 104.7 FL (ref 80–100)
MONOCYTES ABSOLUTE: 0.3 K/UL (ref 0–1.3)
MONOCYTES RELATIVE PERCENT: 5.3 %
NEUTROPHILS ABSOLUTE: 4.6 K/UL (ref 1.7–7.7)
NEUTROPHILS RELATIVE PERCENT: 79.8 %
PDW BLD-RTO: 14.5 % (ref 12.4–15.4)
PERFORMED ON: NORMAL
PLATELET # BLD: 140 K/UL (ref 135–450)
PMV BLD AUTO: 8.3 FL (ref 5–10.5)
POTASSIUM SERPL-SCNC: 4.2 MMOL/L (ref 3.5–5.1)
PRO-BNP: 178 PG/ML (ref 0–449)
RBC # BLD: 4.08 M/UL (ref 4–5.2)
SODIUM BLD-SCNC: 133 MMOL/L (ref 136–145)
TOTAL PROTEIN: 7.6 G/DL (ref 6.4–8.2)
TROPONIN: <0.01 NG/ML
TROPONIN: <0.01 NG/ML
WBC # BLD: 5.7 K/UL (ref 4–11)

## 2019-11-21 PROCEDURE — 2580000003 HC RX 258: Performed by: PHYSICIAN ASSISTANT

## 2019-11-21 PROCEDURE — 71046 X-RAY EXAM CHEST 2 VIEWS: CPT

## 2019-11-21 PROCEDURE — 99285 EMERGENCY DEPT VISIT HI MDM: CPT

## 2019-11-21 PROCEDURE — 6370000000 HC RX 637 (ALT 250 FOR IP): Performed by: PHYSICIAN ASSISTANT

## 2019-11-21 PROCEDURE — 83036 HEMOGLOBIN GLYCOSYLATED A1C: CPT

## 2019-11-21 PROCEDURE — 93005 ELECTROCARDIOGRAM TRACING: CPT | Performed by: EMERGENCY MEDICINE

## 2019-11-21 PROCEDURE — 85025 COMPLETE CBC W/AUTO DIFF WBC: CPT

## 2019-11-21 PROCEDURE — 2580000003 HC RX 258: Performed by: INTERNAL MEDICINE

## 2019-11-21 PROCEDURE — 83880 ASSAY OF NATRIURETIC PEPTIDE: CPT

## 2019-11-21 PROCEDURE — 85379 FIBRIN DEGRADATION QUANT: CPT

## 2019-11-21 PROCEDURE — 6360000002 HC RX W HCPCS: Performed by: PHYSICIAN ASSISTANT

## 2019-11-21 PROCEDURE — 83690 ASSAY OF LIPASE: CPT

## 2019-11-21 PROCEDURE — 80076 HEPATIC FUNCTION PANEL: CPT

## 2019-11-21 PROCEDURE — 80048 BASIC METABOLIC PNL TOTAL CA: CPT

## 2019-11-21 PROCEDURE — 6370000000 HC RX 637 (ALT 250 FOR IP): Performed by: INTERNAL MEDICINE

## 2019-11-21 PROCEDURE — 94760 N-INVAS EAR/PLS OXIMETRY 1: CPT

## 2019-11-21 PROCEDURE — 84484 ASSAY OF TROPONIN QUANT: CPT

## 2019-11-21 PROCEDURE — G0378 HOSPITAL OBSERVATION PER HR: HCPCS

## 2019-11-21 PROCEDURE — 96374 THER/PROPH/DIAG INJ IV PUSH: CPT

## 2019-11-21 PROCEDURE — 36415 COLL VENOUS BLD VENIPUNCTURE: CPT

## 2019-11-21 PROCEDURE — 2700000000 HC OXYGEN THERAPY PER DAY

## 2019-11-21 RX ORDER — INSULIN LISPRO 100 [IU]/ML
0-6 INJECTION, SOLUTION INTRAVENOUS; SUBCUTANEOUS NIGHTLY
Status: DISCONTINUED | OUTPATIENT
Start: 2019-11-21 | End: 2019-11-22 | Stop reason: HOSPADM

## 2019-11-21 RX ORDER — HYDRALAZINE HYDROCHLORIDE 20 MG/ML
10 INJECTION INTRAMUSCULAR; INTRAVENOUS EVERY 6 HOURS PRN
Status: DISCONTINUED | OUTPATIENT
Start: 2019-11-21 | End: 2019-11-22 | Stop reason: HOSPADM

## 2019-11-21 RX ORDER — OYSTER SHELL CALCIUM WITH VITAMIN D 500; 200 MG/1; [IU]/1
1 TABLET, FILM COATED ORAL 2 TIMES DAILY
Status: DISCONTINUED | OUTPATIENT
Start: 2019-11-21 | End: 2019-11-22 | Stop reason: HOSPADM

## 2019-11-21 RX ORDER — POTASSIUM CHLORIDE 20 MEQ/1
40 TABLET, EXTENDED RELEASE ORAL PRN
Status: DISCONTINUED | OUTPATIENT
Start: 2019-11-21 | End: 2019-11-21 | Stop reason: SDUPTHER

## 2019-11-21 RX ORDER — ONDANSETRON 2 MG/ML
4 INJECTION INTRAMUSCULAR; INTRAVENOUS EVERY 6 HOURS PRN
Status: DISCONTINUED | OUTPATIENT
Start: 2019-11-21 | End: 2019-11-22 | Stop reason: HOSPADM

## 2019-11-21 RX ORDER — TRAMADOL HYDROCHLORIDE 50 MG/1
50 TABLET ORAL EVERY 6 HOURS PRN
COMMUNITY

## 2019-11-21 RX ORDER — PANTOPRAZOLE SODIUM 40 MG/1
40 TABLET, DELAYED RELEASE ORAL NIGHTLY
COMMUNITY

## 2019-11-21 RX ORDER — 0.9 % SODIUM CHLORIDE 0.9 %
500 INTRAVENOUS SOLUTION INTRAVENOUS PRN
Status: DISCONTINUED | OUTPATIENT
Start: 2019-11-21 | End: 2019-11-22 | Stop reason: HOSPADM

## 2019-11-21 RX ORDER — POTASSIUM CHLORIDE 7.45 MG/ML
10 INJECTION INTRAVENOUS PRN
Status: DISCONTINUED | OUTPATIENT
Start: 2019-11-21 | End: 2019-11-22 | Stop reason: HOSPADM

## 2019-11-21 RX ORDER — RISPERIDONE 0.25 MG/1
0.25 TABLET, FILM COATED ORAL 2 TIMES DAILY
Status: DISCONTINUED | OUTPATIENT
Start: 2019-11-22 | End: 2019-11-22 | Stop reason: HOSPADM

## 2019-11-21 RX ORDER — DEXTROSE MONOHYDRATE 50 MG/ML
100 INJECTION, SOLUTION INTRAVENOUS PRN
Status: DISCONTINUED | OUTPATIENT
Start: 2019-11-21 | End: 2019-11-22 | Stop reason: HOSPADM

## 2019-11-21 RX ORDER — 0.9 % SODIUM CHLORIDE 0.9 %
1000 INTRAVENOUS SOLUTION INTRAVENOUS ONCE
Status: COMPLETED | OUTPATIENT
Start: 2019-11-21 | End: 2019-11-21

## 2019-11-21 RX ORDER — LACTOBACILLUS RHAMNOSUS GG 10B CELL
1 CAPSULE ORAL 2 TIMES DAILY
COMMUNITY

## 2019-11-21 RX ORDER — DIPHENHYDRAMINE HCL 25 MG
25 TABLET ORAL ONCE
Status: COMPLETED | OUTPATIENT
Start: 2019-11-21 | End: 2019-11-21

## 2019-11-21 RX ORDER — DEXTROSE MONOHYDRATE 25 G/50ML
12.5 INJECTION, SOLUTION INTRAVENOUS PRN
Status: DISCONTINUED | OUTPATIENT
Start: 2019-11-21 | End: 2019-11-22 | Stop reason: HOSPADM

## 2019-11-21 RX ORDER — NICOTINE POLACRILEX 4 MG
15 LOZENGE BUCCAL PRN
Status: DISCONTINUED | OUTPATIENT
Start: 2019-11-21 | End: 2019-11-22 | Stop reason: HOSPADM

## 2019-11-21 RX ORDER — ASPIRIN 81 MG/1
81 TABLET, CHEWABLE ORAL DAILY
Status: DISCONTINUED | OUTPATIENT
Start: 2019-11-22 | End: 2019-11-22 | Stop reason: HOSPADM

## 2019-11-21 RX ORDER — ATORVASTATIN CALCIUM 80 MG/1
40 TABLET, FILM COATED ORAL NIGHTLY
Status: DISCONTINUED | OUTPATIENT
Start: 2019-11-21 | End: 2019-11-21 | Stop reason: SDUPTHER

## 2019-11-21 RX ORDER — ALLOPURINOL 300 MG/1
300 TABLET ORAL DAILY
Status: DISCONTINUED | OUTPATIENT
Start: 2019-11-22 | End: 2019-11-22 | Stop reason: HOSPADM

## 2019-11-21 RX ORDER — RISPERIDONE 0.25 MG/1
0.25 TABLET, FILM COATED ORAL 2 TIMES DAILY
COMMUNITY

## 2019-11-21 RX ORDER — POTASSIUM CHLORIDE 7.45 MG/ML
10 INJECTION INTRAVENOUS PRN
Status: DISCONTINUED | OUTPATIENT
Start: 2019-11-21 | End: 2019-11-21 | Stop reason: SDUPTHER

## 2019-11-21 RX ORDER — NITROFURANTOIN 25; 75 MG/1; MG/1
100 CAPSULE ORAL 2 TIMES DAILY
Status: DISCONTINUED | OUTPATIENT
Start: 2019-11-21 | End: 2019-11-22 | Stop reason: HOSPADM

## 2019-11-21 RX ORDER — ATORVASTATIN CALCIUM 10 MG/1
10 TABLET, FILM COATED ORAL NIGHTLY
Status: DISCONTINUED | OUTPATIENT
Start: 2019-11-21 | End: 2019-11-22 | Stop reason: HOSPADM

## 2019-11-21 RX ORDER — NITROGLYCERIN 0.4 MG/1
0.4 TABLET SUBLINGUAL EVERY 5 MIN PRN
Status: DISCONTINUED | OUTPATIENT
Start: 2019-11-21 | End: 2019-11-22 | Stop reason: HOSPADM

## 2019-11-21 RX ORDER — POTASSIUM CHLORIDE 20 MEQ/1
40 TABLET, EXTENDED RELEASE ORAL PRN
Status: DISCONTINUED | OUTPATIENT
Start: 2019-11-21 | End: 2019-11-22 | Stop reason: HOSPADM

## 2019-11-21 RX ORDER — SODIUM CHLORIDE 0.9 % (FLUSH) 0.9 %
10 SYRINGE (ML) INJECTION EVERY 12 HOURS SCHEDULED
Status: DISCONTINUED | OUTPATIENT
Start: 2019-11-21 | End: 2019-11-22 | Stop reason: HOSPADM

## 2019-11-21 RX ORDER — POTASSIUM CHLORIDE 20 MEQ/1
20 TABLET, EXTENDED RELEASE ORAL 2 TIMES DAILY
Status: DISCONTINUED | OUTPATIENT
Start: 2019-11-21 | End: 2019-11-22 | Stop reason: HOSPADM

## 2019-11-21 RX ORDER — PANTOPRAZOLE SODIUM 40 MG/1
40 TABLET, DELAYED RELEASE ORAL NIGHTLY
Status: DISCONTINUED | OUTPATIENT
Start: 2019-11-21 | End: 2019-11-22 | Stop reason: HOSPADM

## 2019-11-21 RX ORDER — OYSTER SHELL CALCIUM WITH VITAMIN D 500; 200 MG/1; [IU]/1
1 TABLET, FILM COATED ORAL 2 TIMES DAILY
COMMUNITY

## 2019-11-21 RX ORDER — NITROFURANTOIN 25; 75 MG/1; MG/1
100 CAPSULE ORAL 2 TIMES DAILY
COMMUNITY
Start: 2019-11-15 | End: 2019-11-22

## 2019-11-21 RX ORDER — POTASSIUM CHLORIDE 20 MEQ/1
20 TABLET, EXTENDED RELEASE ORAL 2 TIMES DAILY
COMMUNITY

## 2019-11-21 RX ORDER — LACTOBACILLUS RHAMNOSUS GG 10B CELL
1 CAPSULE ORAL 2 TIMES DAILY
Status: DISCONTINUED | OUTPATIENT
Start: 2019-11-21 | End: 2019-11-22 | Stop reason: HOSPADM

## 2019-11-21 RX ORDER — ASPIRIN 81 MG/1
324 TABLET, CHEWABLE ORAL ONCE
Status: COMPLETED | OUTPATIENT
Start: 2019-11-21 | End: 2019-11-21

## 2019-11-21 RX ORDER — LANOLIN ALCOHOL/MO/W.PET/CERES
400 CREAM (GRAM) TOPICAL DAILY
Status: DISCONTINUED | OUTPATIENT
Start: 2019-11-22 | End: 2019-11-21

## 2019-11-21 RX ORDER — INSULIN LISPRO 100 [IU]/ML
0-12 INJECTION, SOLUTION INTRAVENOUS; SUBCUTANEOUS
Status: DISCONTINUED | OUTPATIENT
Start: 2019-11-22 | End: 2019-11-22 | Stop reason: HOSPADM

## 2019-11-21 RX ORDER — RISPERIDONE 0.25 MG/1
0.25 TABLET, FILM COATED ORAL NIGHTLY
Status: DISCONTINUED | OUTPATIENT
Start: 2019-11-21 | End: 2019-11-22 | Stop reason: HOSPADM

## 2019-11-21 RX ORDER — ONDANSETRON 2 MG/ML
4 INJECTION INTRAMUSCULAR; INTRAVENOUS ONCE
Status: COMPLETED | OUTPATIENT
Start: 2019-11-21 | End: 2019-11-21

## 2019-11-21 RX ORDER — ESCITALOPRAM OXALATE 10 MG/1
10 TABLET ORAL NIGHTLY
Status: DISCONTINUED | OUTPATIENT
Start: 2019-11-21 | End: 2019-11-22 | Stop reason: HOSPADM

## 2019-11-21 RX ORDER — ALENDRONATE SODIUM 70 MG/1
70 TABLET ORAL
Status: DISCONTINUED | OUTPATIENT
Start: 2019-11-21 | End: 2019-11-21 | Stop reason: RX

## 2019-11-21 RX ORDER — PREGABALIN 25 MG/1
75 CAPSULE ORAL 2 TIMES DAILY
Status: DISCONTINUED | OUTPATIENT
Start: 2019-11-21 | End: 2019-11-22 | Stop reason: HOSPADM

## 2019-11-21 RX ORDER — TRAMADOL HYDROCHLORIDE 50 MG/1
50 TABLET ORAL EVERY 6 HOURS PRN
Status: DISCONTINUED | OUTPATIENT
Start: 2019-11-21 | End: 2019-11-22 | Stop reason: HOSPADM

## 2019-11-21 RX ORDER — MIDODRINE HYDROCHLORIDE 5 MG/1
10 TABLET ORAL
Status: DISCONTINUED | OUTPATIENT
Start: 2019-11-22 | End: 2019-11-22 | Stop reason: HOSPADM

## 2019-11-21 RX ORDER — ESCITALOPRAM OXALATE 10 MG/1
10 TABLET ORAL DAILY
Status: DISCONTINUED | OUTPATIENT
Start: 2019-11-22 | End: 2019-11-21

## 2019-11-21 RX ORDER — LANOLIN ALCOHOL/MO/W.PET/CERES
400 CREAM (GRAM) TOPICAL NIGHTLY
Status: DISCONTINUED | OUTPATIENT
Start: 2019-11-21 | End: 2019-11-22 | Stop reason: HOSPADM

## 2019-11-21 RX ORDER — SODIUM CHLORIDE 0.9 % (FLUSH) 0.9 %
10 SYRINGE (ML) INJECTION PRN
Status: DISCONTINUED | OUTPATIENT
Start: 2019-11-21 | End: 2019-11-22 | Stop reason: HOSPADM

## 2019-11-21 RX ORDER — ATORVASTATIN CALCIUM 10 MG/1
10 TABLET, FILM COATED ORAL NIGHTLY
COMMUNITY

## 2019-11-21 RX ADMIN — POTASSIUM CHLORIDE 20 MEQ: 1500 TABLET, EXTENDED RELEASE ORAL at 22:21

## 2019-11-21 RX ADMIN — SODIUM CHLORIDE 1000 ML: 9 INJECTION, SOLUTION INTRAVENOUS at 18:11

## 2019-11-21 RX ADMIN — ASPIRIN 81 MG 324 MG: 81 TABLET ORAL at 18:29

## 2019-11-21 RX ADMIN — Medication 1 CAPSULE: at 22:24

## 2019-11-21 RX ADMIN — DIPHENHYDRAMINE HCL 25 MG: 25 TABLET ORAL at 18:29

## 2019-11-21 RX ADMIN — PREGABALIN 75 MG: 25 CAPSULE ORAL at 22:22

## 2019-11-21 RX ADMIN — PANTOPRAZOLE SODIUM 40 MG: 40 TABLET, DELAYED RELEASE ORAL at 22:25

## 2019-11-21 RX ADMIN — ESCITALOPRAM OXALATE 10 MG: 10 TABLET ORAL at 04:00

## 2019-11-21 RX ADMIN — Medication 400 MG: at 22:26

## 2019-11-21 RX ADMIN — CALCIUM CARBONATE-VITAMIN D TAB 500 MG-200 UNIT 1 TABLET: 500-200 TAB at 22:24

## 2019-11-21 RX ADMIN — ATORVASTATIN CALCIUM 10 MG: 10 TABLET, FILM COATED ORAL at 22:26

## 2019-11-21 RX ADMIN — RISPERIDONE 0.25 MG: 0.25 TABLET ORAL at 22:27

## 2019-11-21 RX ADMIN — Medication 10 ML: at 22:27

## 2019-11-21 RX ADMIN — NITROFURANTOIN MONOHYDRATE/MACROCRYSTALLINE 100 MG: 25; 75 CAPSULE ORAL at 22:22

## 2019-11-21 RX ADMIN — ONDANSETRON 4 MG: 2 INJECTION INTRAMUSCULAR; INTRAVENOUS at 18:11

## 2019-11-21 ASSESSMENT — ENCOUNTER SYMPTOMS
VOMITING: 0
RHINORRHEA: 0
ABDOMINAL PAIN: 0
VOMITING: 1
EYE ITCHING: 0
NAUSEA: 1
CHEST TIGHTNESS: 1
SHORTNESS OF BREATH: 1
DIARRHEA: 0
EYE PAIN: 0
COUGH: 0

## 2019-11-21 ASSESSMENT — PAIN SCALES - GENERAL: PAINLEVEL_OUTOF10: 0

## 2019-11-22 VITALS
RESPIRATION RATE: 16 BRPM | BODY MASS INDEX: 24.13 KG/M2 | HEART RATE: 75 BPM | TEMPERATURE: 96.8 F | DIASTOLIC BLOOD PRESSURE: 65 MMHG | OXYGEN SATURATION: 95 % | WEIGHT: 136.2 LBS | SYSTOLIC BLOOD PRESSURE: 105 MMHG

## 2019-11-22 LAB
A/G RATIO: 1.4 (ref 1.1–2.2)
ALBUMIN SERPL-MCNC: 3.7 G/DL (ref 3.4–5)
ALP BLD-CCNC: 73 U/L (ref 40–129)
ALT SERPL-CCNC: 11 U/L (ref 10–40)
ANION GAP SERPL CALCULATED.3IONS-SCNC: 12 MMOL/L (ref 3–16)
AST SERPL-CCNC: 14 U/L (ref 15–37)
BASOPHILS ABSOLUTE: 0.1 K/UL (ref 0–0.2)
BASOPHILS RELATIVE PERCENT: 1.3 %
BILIRUB SERPL-MCNC: 0.3 MG/DL (ref 0–1)
BUN BLDV-MCNC: 13 MG/DL (ref 7–20)
CALCIUM SERPL-MCNC: 9.3 MG/DL (ref 8.3–10.6)
CHLORIDE BLD-SCNC: 103 MMOL/L (ref 99–110)
CHOLESTEROL, TOTAL: 130 MG/DL (ref 0–199)
CO2: 22 MMOL/L (ref 21–32)
CREAT SERPL-MCNC: 0.6 MG/DL (ref 0.6–1.2)
EKG ATRIAL RATE: 107 BPM
EKG ATRIAL RATE: 71 BPM
EKG DIAGNOSIS: NORMAL
EKG DIAGNOSIS: NORMAL
EKG P AXIS: 37 DEGREES
EKG P AXIS: 61 DEGREES
EKG P-R INTERVAL: 126 MS
EKG P-R INTERVAL: 130 MS
EKG Q-T INTERVAL: 298 MS
EKG Q-T INTERVAL: 468 MS
EKG QRS DURATION: 76 MS
EKG QRS DURATION: 86 MS
EKG QTC CALCULATION (BAZETT): 397 MS
EKG QTC CALCULATION (BAZETT): 508 MS
EKG R AXIS: -37 DEGREES
EKG R AXIS: -52 DEGREES
EKG T AXIS: 156 DEGREES
EKG T AXIS: 59 DEGREES
EKG VENTRICULAR RATE: 107 BPM
EKG VENTRICULAR RATE: 71 BPM
EOSINOPHILS ABSOLUTE: 0.1 K/UL (ref 0–0.6)
EOSINOPHILS RELATIVE PERCENT: 3.3 %
ESTIMATED AVERAGE GLUCOSE: 116.9 MG/DL
GFR AFRICAN AMERICAN: >60
GFR NON-AFRICAN AMERICAN: >60
GLOBULIN: 2.6 G/DL
GLUCOSE BLD-MCNC: 142 MG/DL (ref 70–99)
GLUCOSE BLD-MCNC: 83 MG/DL (ref 70–99)
GLUCOSE BLD-MCNC: 84 MG/DL (ref 70–99)
GLUCOSE BLD-MCNC: 85 MG/DL (ref 70–99)
GLUCOSE BLD-MCNC: 96 MG/DL (ref 70–99)
HBA1C MFR BLD: 5.7 %
HCT VFR BLD CALC: 36 % (ref 36–48)
HDLC SERPL-MCNC: 60 MG/DL (ref 40–60)
HEMOGLOBIN: 11.6 G/DL (ref 12–16)
LDL CHOLESTEROL CALCULATED: 56 MG/DL
LV EF: 53 %
LVEF MODALITY: NORMAL
LYMPHOCYTES ABSOLUTE: 1.5 K/UL (ref 1–5.1)
LYMPHOCYTES RELATIVE PERCENT: 34.5 %
MCH RBC QN AUTO: 34.5 PG (ref 26–34)
MCHC RBC AUTO-ENTMCNC: 32.3 G/DL (ref 31–36)
MCV RBC AUTO: 106.9 FL (ref 80–100)
MONOCYTES ABSOLUTE: 0.5 K/UL (ref 0–1.3)
MONOCYTES RELATIVE PERCENT: 10.5 %
NEUTROPHILS ABSOLUTE: 2.2 K/UL (ref 1.7–7.7)
NEUTROPHILS RELATIVE PERCENT: 50.4 %
PDW BLD-RTO: 14.9 % (ref 12.4–15.4)
PERFORMED ON: ABNORMAL
PERFORMED ON: NORMAL
PLATELET # BLD: 125 K/UL (ref 135–450)
PMV BLD AUTO: 8.2 FL (ref 5–10.5)
POTASSIUM REFLEX MAGNESIUM: 4.6 MMOL/L (ref 3.5–5.1)
POTASSIUM SERPL-SCNC: 4.6 MMOL/L (ref 3.5–5.1)
RBC # BLD: 3.37 M/UL (ref 4–5.2)
SODIUM BLD-SCNC: 137 MMOL/L (ref 136–145)
TOTAL PROTEIN: 6.3 G/DL (ref 6.4–8.2)
TRIGL SERPL-MCNC: 71 MG/DL (ref 0–150)
TROPONIN: <0.01 NG/ML
VLDLC SERPL CALC-MCNC: 14 MG/DL
WBC # BLD: 4.4 K/UL (ref 4–11)

## 2019-11-22 PROCEDURE — 96372 THER/PROPH/DIAG INJ SC/IM: CPT

## 2019-11-22 PROCEDURE — 80053 COMPREHEN METABOLIC PANEL: CPT

## 2019-11-22 PROCEDURE — G0378 HOSPITAL OBSERVATION PER HR: HCPCS

## 2019-11-22 PROCEDURE — 36415 COLL VENOUS BLD VENIPUNCTURE: CPT

## 2019-11-22 PROCEDURE — 93005 ELECTROCARDIOGRAM TRACING: CPT | Performed by: INTERNAL MEDICINE

## 2019-11-22 PROCEDURE — 80061 LIPID PANEL: CPT

## 2019-11-22 PROCEDURE — 2580000003 HC RX 258: Performed by: INTERNAL MEDICINE

## 2019-11-22 PROCEDURE — 93010 ELECTROCARDIOGRAM REPORT: CPT | Performed by: INTERNAL MEDICINE

## 2019-11-22 PROCEDURE — 93306 TTE W/DOPPLER COMPLETE: CPT

## 2019-11-22 PROCEDURE — 84484 ASSAY OF TROPONIN QUANT: CPT

## 2019-11-22 PROCEDURE — 2700000000 HC OXYGEN THERAPY PER DAY

## 2019-11-22 PROCEDURE — 94760 N-INVAS EAR/PLS OXIMETRY 1: CPT

## 2019-11-22 PROCEDURE — 6360000002 HC RX W HCPCS: Performed by: INTERNAL MEDICINE

## 2019-11-22 PROCEDURE — 6370000000 HC RX 637 (ALT 250 FOR IP): Performed by: INTERNAL MEDICINE

## 2019-11-22 PROCEDURE — 85025 COMPLETE CBC W/AUTO DIFF WBC: CPT

## 2019-11-22 RX ADMIN — NITROFURANTOIN MONOHYDRATE/MACROCRYSTALLINE 100 MG: 25; 75 CAPSULE ORAL at 11:27

## 2019-11-22 RX ADMIN — POTASSIUM CHLORIDE 20 MEQ: 1500 TABLET, EXTENDED RELEASE ORAL at 11:28

## 2019-11-22 RX ADMIN — ALLOPURINOL 300 MG: 300 TABLET ORAL at 11:27

## 2019-11-22 RX ADMIN — RISPERIDONE 0.25 MG: 0.25 TABLET ORAL at 11:31

## 2019-11-22 RX ADMIN — MIDODRINE HYDROCHLORIDE 10 MG: 5 TABLET ORAL at 13:14

## 2019-11-22 RX ADMIN — Medication 1 CAPSULE: at 11:28

## 2019-11-22 RX ADMIN — PREGABALIN 75 MG: 25 CAPSULE ORAL at 13:14

## 2019-11-22 RX ADMIN — ASPIRIN 81 MG 81 MG: 81 TABLET ORAL at 11:28

## 2019-11-22 RX ADMIN — ENOXAPARIN SODIUM 40 MG: 40 INJECTION SUBCUTANEOUS at 11:27

## 2019-11-22 RX ADMIN — Medication 10 ML: at 11:31

## 2019-11-22 RX ADMIN — CALCIUM CARBONATE-VITAMIN D TAB 500 MG-200 UNIT 1 TABLET: 500-200 TAB at 11:28

## 2019-11-22 RX ADMIN — METFORMIN HYDROCHLORIDE 500 MG: 500 TABLET ORAL at 11:27

## 2019-11-22 RX ADMIN — BISACODYL 5 MG: 5 TABLET, COATED ORAL at 11:28

## 2019-11-22 ASSESSMENT — PAIN SCALES - GENERAL
PAINLEVEL_OUTOF10: 0
PAINLEVEL_OUTOF10: 0

## 2019-11-27 ENCOUNTER — HOSPITAL ENCOUNTER (OUTPATIENT)
Dept: NON INVASIVE DIAGNOSTICS | Age: 79
Discharge: HOME | End: 2019-11-27
Payer: COMMERCIAL

## 2019-11-27 DIAGNOSIS — I34.0 MITRAL VALVE INSUFFICIENCY, UNSPECIFIED ETIOLOGY: ICD-10-CM

## 2020-11-05 PROBLEM — R00.2 PALPITATIONS: Status: ACTIVE | Noted: 2020-11-05

## 2020-11-05 PROBLEM — I10 ESSENTIAL HYPERTENSION: Status: ACTIVE | Noted: 2020-11-05

## 2020-12-10 PROBLEM — I65.29 STENOSIS OF CAROTID ARTERY: Status: ACTIVE | Noted: 2020-12-10

## 2020-12-10 NOTE — PROGRESS NOTES
[address]   Cardiac Evaluation      Patient: Jerald Motta  YOB: 1940         Chief Complaint   Patient presents with    1 Year Follow Up    Hypertension    Hyperlipidemia    Follow up    Referring provider: Zeke Moody MD    History of Present Illness:  Ms. Maribeth Aranda is a 70year old female, previously seen by Dr. Clara Adan here for follow up after having an episode of palpitations last summer that lasted half a day. She denies reoccurrence. She has a medical history of aortobifem bypass, carotid endarterectomy, hypertension, hyperlipidemia, diabetes, chronic kidney disease, COPD. She current lives at [facility]. Today she is here from her NH with an aide. She says that her heart is doing well and she has had no SOB, chest pain, palpitations. She has been healthy. She is active in her home but is in a wheelchair       With regard to medication therapy he/she has been compliant with prescribed regimen and has tolerated therapy to date.     Past Medical History:   has a past medical history of Abdominal aneurysm ([address] Anemia, Anesthesia complication, Aortic aneurysm ([address] Arthritis, At risk for falls, Ataxia, Avascular necrosis ([address] Back pain, Bipolar 1 disorder ([address] Blood transfusion, CAD (coronary artery disease), Chronic kidney disease, Clostridium difficile infection, Colitis due to Clostridium difficile, Confusion, COPD (chronic obstructive pulmonary disease) ([address] Dementia ([address] Depression, Diabetes ([address] Diabetes mellitus ([address] Diarrhea, Dysuria, Fracture of right acetabulum ([address] GERD (gastroesophageal reflux disease), Gout, H/O migraine, Hyperlipidemia, Hypertension, Liver disease, Major depressive disorder, Neuropathy, Other disorders of kidney and ureter, Pneumonia, Prolonged emergence from general anesthesia, Shoulder (girdle) dystocia during labor and deliver, delivered, Type II or unspecified type diabetes mellitus without mention of  metFORMIN (GLUCOPHAGE) 500 MG tablet Take 500 mg by mouth 2 times daily (with meals)      LYRICA 75 MG capsule TAKE ONE CAPSULE BY MOUTH TWICE DAILY 180 capsule 0    allopurinol (ZYLOPRIM) 300 MG tablet TAKE 1 TABLET BY MOUTH ONCE DAILY 90 tablet 0     No current facility-administered medications for this visit. Allergies:  Clindamycin/lincomycin, Penicillins, Ambien [zolpidem tartrate], Chantix [varenicline], Pentazocine lactate, Sulfa antibiotics, and Zetia [ezetimibe]     Social History:  Social History     Socioeconomic History    Marital status:       Spouse name: Not on file    Number of children: 2    Years of education: 15    Highest education level: Not on file   Occupational History    Not on file   Social Needs    Financial resource strain: Not on file    Food insecurity     Worry: Not on file     Inability: Not on file    Transportation needs     Medical: Not on file     Non-medical: Not on file   Tobacco Use    Smoking status: Former Smoker     Packs/day: 0.10     Years: 50.00     Pack years: 5.00     Types: Cigarettes     Quit date: 2015     Years since quittin.0    Smokeless tobacco: Never Used   Substance and Sexual Activity    Alcohol use: No     Alcohol/week: 0.0 standard drinks     Comment: rare    Drug use: No    Sexual activity: Not Currently   Lifestyle    Physical activity     Days per week: Not on file     Minutes per session: Not on file    Stress: Not on file   Relationships    Social connections     Talks on phone: Not on file     Gets together: Not on file     Attends [employer] service: Not on file     Active member of club or organization: Not on file     Attends meetings of clubs or organizations: Not on file     Relationship status: Not on file    Intimate partner violence     Fear of current or ex partner: Not on file     Emotionally abused: Not on file     Physically abused: Not on file     Forced sexual activity: Not on file   Other Topics Concern    Not on file   Social History Narrative    Not on file       Family History:   Family History   Problem Relation Age of Onset    Early Death Father     Substance Abuse Father     High Blood Pressure Sister     High Blood Pressure Brother     Dementia Brother     High Blood Pressure Sister     Diabetes Sister     High Blood Pressure Sister     Diabetes Mother     Cancer Mother      Family history has been reviewed and not pertinent except as noted above. Review of Systems:   · Constitutional: there has been no unanticipated weight loss. No change in energy or activity level   · Eyes: No visual changes   · ENT: No Headaches, hearing loss or vertigo. No mouth sores or sore throat. · Cardiovascular: Reviewed in HPI  · Respiratory: No cough or wheezing, no sputum production. · Gastrointestinal: No abdominal pain, appetite loss, blood in stools. No change in bowel or bladder habits. · Genitourinary: No nocturia, dysuria, trouble voiding  · Musculoskeletal:  No gait disturbance, weakness or joint complaints. · Integumentary: No rash or pruritis. · Neurological: No headache, change in muscle strength, numbness or tingling. No change in gait, balance, coordination, mood, affect, memory, mentation, behavior. · Psychiatric: No anxiety or depression  · Endocrine: No malaise or fever  · Hematologic/Lymphatic: No abnormal bruising or bleeding, blood clots or swollen lymph nodes. · Allergic/Immunologic: No nasal congestion or hives. Physical Examination:    Vitals:    12/16/20 0856   BP: 95/67   Site: Left Upper Arm   Position: Sitting   Cuff Size: Medium Adult   Pulse: 86   Resp: 20   SpO2: 95%   Weight: 132 lb (59.9 kg)   Height: 5' 3\" (1.6 m)     Body mass index is 23.38 kg/m².      Wt Readings from Last 3 Encounters:   12/16/20 132 lb (59.9 kg)   11/22/19 136 lb 3.2 oz (61.8 kg)   11/08/19 138 lb 1.9 oz (62.7 kg)      BP Readings from Last 3 Encounters:   12/16/20 95/67   11/22/19 105/65   11/08/19 130/72        Physical Examination:    · CONSTITUTIONAL: Well developed, well nourished  · EYES: PERRLA. No xanthelasma, sclera non icteric  · EARS,NOSE,MOUTH,THROAT:  Mucous membranes moist, normal hearing  · NECK: Supple, JVP normal, thyroid not enlarged. Carotids 2+ without bruits  · RESPIRATORY: Normal effort, no rales or rhonchi  · CARDIOVASCULAR: Normal PMI, regular rate and rhythm, no murmurs, rub or gallop. No edema. Radial pulses present and equal  · CHEST: No scar or masses  · ABDOMEN: Normal bowel sounds. No masses or tenderness. No bruit  · MUSCULOSKELETAL: No clubbing or cyanosis. Moves all extremities well. Normal gait  · SKIN:  Warm and dry. No rashes  · NEUROLOGIC: Cranial nerves intact. Alert and oriented  · PSYCHIATRIC: Calm affect. Appears to have normal judgement and insight    All testing and labs listed below were personally reviewed by myself. Echo 11/21/19  Summary   Normal left ventricle size, wall thickness, and systolic function with an   estimated ejection fraction of 50-55%. Grade I diastolic dysfunction with normal LV filling pressures. Mild mitral regurgitation. Mild aortic regurgitation. 14 day event monitor 6/14/19  Baseline rhythm is NSR. No significant arrhythmias noted. No pauses. No evidence of SVT or VT. Per care everywhere. SPECT 10/5/2018: No EKG evidence for ischemia, no evidence of myocardial ischemia or scar       Assessment/Plan  1. Palpitations    2. Mitral valve insufficiency, unspecified etiology    3. Essential hypertension    4. Hyperlipidemia, unspecified hyperlipidemia type    5. Abdominal aortic aneurysm dissection (HCC)    6. Stenosis of carotid artery, unspecified laterality          Palpitations  Per care everywhere 14 day event monitor 6/14/19  Baseline rhythm is NSR. No significant arrhythmias noted. No pauses. No evidence of SVT or VT. Mitral valve insufficiency  Per echo 11/12/19 - Mild MR.  Repeat echo before next visit.    Essential hypertension  Controlled~> hypotension   Meds~ on midodrine  Plan~ monitor    Hyperlipidemia  LDL~ 56  Date of last lipid panel~ 2019  Meds~ Lipitor  Plan~ stable    Abdominal aortic aneurysm dissection ([address]  4/21/19: AAA 3.9 cm  Recommend repeating annually  Aortobyfem bypass 8/5/13 by Dr. Prince Singh ordered. Stenosis of carotid artery  Hx carotid endarterectomy  Carotid US 6/12/13: JEREMIAS 8-43% stenosis, LICA 50-90% stenosis  Repeat duplex. Orders Placed This Encounter   Procedures    VL DUP CAROTID BILATERAL    US DUPLEX SCAN OF AORTA    Echo 2D w doppler w color complete       Mak Angeles MD      Thank you for allowing to me to participate in the care of Laila Ocasio. Scribe's Attestation: This note was scribed in the presence of Dr. Hetal Gallardo MD by Barry Mcknight RN.    I, Dr. Hetal Gallardo, personally performed the services described in this documentation, as scribed by the above signed scribe in my presence. It is both accurate and complete to my knowledge. I agree with the details independently gathered by the clinical support staff, while the remaining scribed note accurately describes my personal service to the patient.

## 2020-12-10 NOTE — ASSESSMENT & PLAN NOTE
Per care everywhere 14 day event monitor 6/14/19  Baseline rhythm is NSR. No significant arrhythmias noted. No pauses. No evidence of SVT or VT.

## 2020-12-10 NOTE — ASSESSMENT & PLAN NOTE
Hx carotid endarterectomy  Carotid US 6/12/13: JEREMIAS 3-47% stenosis, LICA 51-12% stenosis  Repeat duplex.

## 2020-12-10 NOTE — ASSESSMENT & PLAN NOTE
4/21/19: AAA 3.9 cm  Recommend repeating annually  Aortobyfem bypass 8/5/13 by Dr. Fior Farnsworth ordered.

## 2020-12-16 ENCOUNTER — OFFICE VISIT (OUTPATIENT)
Dept: CARDIOLOGY CLINIC | Age: 80
End: 2020-12-16
Payer: MEDICARE

## 2020-12-16 VITALS
OXYGEN SATURATION: 95 % | HEIGHT: 63 IN | RESPIRATION RATE: 20 BRPM | DIASTOLIC BLOOD PRESSURE: 67 MMHG | SYSTOLIC BLOOD PRESSURE: 95 MMHG | BODY MASS INDEX: 23.39 KG/M2 | WEIGHT: 132 LBS | HEART RATE: 86 BPM

## 2020-12-16 PROCEDURE — G8420 CALC BMI NORM PARAMETERS: HCPCS | Performed by: INTERNAL MEDICINE

## 2020-12-16 PROCEDURE — G8399 PT W/DXA RESULTS DOCUMENT: HCPCS | Performed by: INTERNAL MEDICINE

## 2020-12-16 PROCEDURE — 1036F TOBACCO NON-USER: CPT | Performed by: INTERNAL MEDICINE

## 2020-12-16 PROCEDURE — 99214 OFFICE O/P EST MOD 30 MIN: CPT | Performed by: INTERNAL MEDICINE

## 2020-12-16 PROCEDURE — 1123F ACP DISCUSS/DSCN MKR DOCD: CPT | Performed by: INTERNAL MEDICINE

## 2020-12-16 PROCEDURE — G8427 DOCREV CUR MEDS BY ELIG CLIN: HCPCS | Performed by: INTERNAL MEDICINE

## 2020-12-16 PROCEDURE — 1090F PRES/ABSN URINE INCON ASSESS: CPT | Performed by: INTERNAL MEDICINE

## 2020-12-16 PROCEDURE — G8484 FLU IMMUNIZE NO ADMIN: HCPCS | Performed by: INTERNAL MEDICINE

## 2020-12-16 PROCEDURE — 4040F PNEUMOC VAC/ADMIN/RCVD: CPT | Performed by: INTERNAL MEDICINE

## 2020-12-16 NOTE — LETTER
[address] [address]  Phone: [phone]  Fax: [phone]    Bethany Howard MD        December 17, 2020     Izabella Gardner MD   [place] [id number]    Patient: Marcelino Morales  MR Number: [id number]  YOB: 1940  Date of Visit: 12/16/2020    Dear Dr. Izabella Gardner:        [employer]   Cardiac Evaluation      Patient: Marcelino Morales  YOB: 1940         Chief Complaint   Patient presents with    1 Year Follow Up    Hypertension    Hyperlipidemia    Follow up    Referring provider: Izabella Gardner MD    History of Present Illness:  Ms. Marcus Eugene is a 70year old female, previously seen by Dr. Li Falling here for follow up after having an episode of palpitations last summer that lasted half a day. She denies reoccurrence. She has a medical history of aortobifem bypass, carotid endarterectomy, hypertension, hyperlipidemia, diabetes, chronic kidney disease, COPD. She current lives at [facility]. Today she is here from her NH with an aide. She says that her heart is doing well and she has had no SOB, chest pain, palpitations. She has been healthy. She is active in her home but is in a wheelchair       With regard to medication therapy he/she has been compliant with prescribed regimen and has tolerated therapy to date.     Past Medical History: has a past medical history of Abdominal aneurysm ([facility] [address] Anemia, Anesthesia complication, Aortic aneurysm ([address] Arthritis, At risk for falls, Ataxia, Avascular necrosis ([address] Back pain, Bipolar 1 disorder ([address] Blood transfusion, CAD (coronary artery disease), Chronic kidney disease, Clostridium difficile infection, Colitis due to Clostridium difficile, Confusion, COPD (chronic obstructive pulmonary disease) ([address] Dementia ([address] Depression, Diabetes ([address] Diabetes mellitus ([address] Diarrhea, Dysuria, Fracture of right acetabulum ([address] GERD (gastroesophageal reflux disease), Gout, H/O migraine, Hyperlipidemia, Hypertension, Liver disease, Major depressive disorder, Neuropathy, Other disorders of kidney and ureter, Pneumonia, Prolonged emergence from general anesthesia, Shoulder (girdle) dystocia during labor and deliver, delivered, Type II or unspecified type diabetes mellitus without mention of complication, not stated as uncontrolled, UTI (urinary tract infection), Vitamin D deficiency, Weakness, and Wrist fracture. Surgical History:   has a past surgical history that includes Kidney stone surgery; Hysterectomy; Tonsillectomy; back surgery; Carotid endarterectomy (5/16/11); Colonoscopy; Abdomen surgery; fracture surgery; Appendectomy (1960); Cholecystectomy; vascular surgery; eye surgery; ERCP (2-1-2013); Abdominal aortic aneurysm repair (8/5/2013); Upper gastrointestinal endoscopy (4-28-14); Wrist surgery (Right, 2/12/16); joint replacement; joint replacement (Left, 09/28/2016); Endoscopy, colon, diagnostic; shoulder surgery (Right, 05/02/2017); and pr total knee arthroplasty (Right, 10/23/2018).      Current Outpatient Medications   Medication Sig Dispense Refill    lactobacillus (CULTURELLE) CAPS capsule Take 1 capsule by mouth 2 times daily      atorvastatin (LIPITOR) 10 MG tablet Take 10 mg by mouth nightly  calcium-vitamin D (OSCAL-500) 500-200 MG-UNIT per tablet Take 1 tablet by mouth 2 times daily      potassium chloride (KLOR-CON M) 20 MEQ extended release tablet Take 20 mEq by mouth 2 times daily      risperiDONE (RISPERDAL) 0.25 MG tablet Take 0.25 mg by mouth 2 times daily      pantoprazole (PROTONIX) 40 MG tablet Take 40 mg by mouth nightly      traMADol (ULTRAM) 50 MG tablet Take 50 mg by mouth every 6 hours as needed for Pain.  miconazole (MICOTIN) 2 % powder Apply topically 2 times daily. 45 g 1    aspirin 81 MG chewable tablet Take 81 mg by mouth daily      magnesium oxide (MAG-OX) 400 MG tablet Take 400 mg by mouth daily      acetaminophen (TYLENOL) 325 MG tablet Take 650 mg by mouth every 6 hours as needed for Pain      midodrine (PROAMATINE) 10 MG tablet Take 1 tablet by mouth 3 times daily (with meals) 90 tablet 3    alendronate (FOSAMAX) 70 MG tablet Take 1 tablet by mouth every 7 days 4 tablet 0    escitalopram (LEXAPRO) 10 MG tablet Take 10 mg by mouth daily      metFORMIN (GLUCOPHAGE) 500 MG tablet Take 500 mg by mouth 2 times daily (with meals)      LYRICA 75 MG capsule TAKE ONE CAPSULE BY MOUTH TWICE DAILY 180 capsule 0    allopurinol (ZYLOPRIM) 300 MG tablet TAKE 1 TABLET BY MOUTH ONCE DAILY 90 tablet 0     No current facility-administered medications for this visit. Allergies:  Clindamycin/lincomycin, Penicillins, Ambien [zolpidem tartrate], Chantix [varenicline], Pentazocine lactate, Sulfa antibiotics, and Zetia [ezetimibe]     Social History:  Social History     Socioeconomic History    Marital status:       Spouse name: Not on file    Number of children: 2    Years of education: 15    Highest education level: Not on file   Occupational History    Not on file   Social Needs    Financial resource strain: Not on file    Food insecurity     Worry: Not on file     Inability: Not on file    Transportation needs     Medical: Not on file Non-medical: Not on file   Tobacco Use    Smoking status: Former Smoker     Packs/day: 0.10     Years: 50.00     Pack years: 5.00     Types: Cigarettes     Quit date: 2015     Years since quittin.0    Smokeless tobacco: Never Used   Substance and Sexual Activity    Alcohol use: No     Alcohol/week: 0.0 standard drinks     Comment: rare    Drug use: No    Sexual activity: Not Currently   Lifestyle    Physical activity     Days per week: Not on file     Minutes per session: Not on file    Stress: Not on file   Relationships    Social connections     Talks on phone: Not on file     Gets together: Not on file     Attends Mandaeism service: Not on file     Active member of club or organization: Not on file     Attends meetings of clubs or organizations: Not on file     Relationship status: Not on file    Intimate partner violence     Fear of current or ex partner: Not on file     Emotionally abused: Not on file     Physically abused: Not on file     Forced sexual activity: Not on file   Other Topics Concern    Not on file   Social History Narrative    Not on file       Family History:   Family History   Problem Relation Age of Onset    Early Death Father     Substance Abuse Father     High Blood Pressure Sister     High Blood Pressure Brother     Dementia Brother     High Blood Pressure Sister     Diabetes Sister     High Blood Pressure Sister     Diabetes Mother     Cancer Mother      Family history has been reviewed and not pertinent except as noted above. Review of Systems:   · Constitutional: there has been no unanticipated weight loss. No change in energy or activity level   · Eyes: No visual changes   · ENT: No Headaches, hearing loss or vertigo. No mouth sores or sore throat. · Cardiovascular: Reviewed in HPI  · Respiratory: No cough or wheezing, no sputum production. · Gastrointestinal: No abdominal pain, appetite loss, blood in stools. No change in bowel or bladder habits. · Genitourinary: No nocturia, dysuria, trouble voiding  · Musculoskeletal:  No gait disturbance, weakness or joint complaints. · Integumentary: No rash or pruritis. · Neurological: No headache, change in muscle strength, numbness or tingling. No change in gait, balance, coordination, mood, affect, memory, mentation, behavior. · Psychiatric: No anxiety or depression  · Endocrine: No malaise or fever  · Hematologic/Lymphatic: No abnormal bruising or bleeding, blood clots or swollen lymph nodes. · Allergic/Immunologic: No nasal congestion or hives. Physical Examination:    Vitals:    12/16/20 0856   BP: 95/67   Site: Left Upper Arm   Position: Sitting   Cuff Size: Medium Adult   Pulse: 86   Resp: 20   SpO2: 95%   Weight: 132 lb (59.9 kg)   Height: 5' 3\" (1.6 m)     Body mass index is 23.38 kg/m². Wt Readings from Last 3 Encounters:   12/16/20 132 lb (59.9 kg)   11/22/19 136 lb 3.2 oz (61.8 kg)   11/08/19 138 lb 1.9 oz (62.7 kg)      BP Readings from Last 3 Encounters:   12/16/20 95/67   11/22/19 105/65   11/08/19 130/72        Physical Examination:    · CONSTITUTIONAL: Well developed, well nourished  · EYES: PERRLA. No xanthelasma, sclera non icteric  · EARS,NOSE,MOUTH,THROAT:  Mucous membranes moist, normal hearing  · NECK: Supple, JVP normal, thyroid not enlarged. Carotids 2+ without bruits  · RESPIRATORY: Normal effort, no rales or rhonchi  · CARDIOVASCULAR: Normal PMI, regular rate and rhythm, no murmurs, rub or gallop. No edema. Radial pulses present and equal  · CHEST: No scar or masses  · ABDOMEN: Normal bowel sounds. No masses or tenderness. No bruit  · MUSCULOSKELETAL: No clubbing or cyanosis. Moves all extremities well. Normal gait  · SKIN:  Warm and dry. No rashes  · NEUROLOGIC: Cranial nerves intact.  Alert and oriented · PSYCHIATRIC: Calm affect. Appears to have normal judgement and insight    All testing and labs listed below were personally reviewed by myself. Echo 11/21/19  Summary   Normal left ventricle size, wall thickness, and systolic function with an   estimated ejection fraction of 50-55%. Grade I diastolic dysfunction with normal LV filling pressures. Mild mitral regurgitation. Mild aortic regurgitation. 14 day event monitor 6/14/19  Baseline rhythm is NSR. No significant arrhythmias noted. No pauses. No evidence of SVT or VT. Per care everywhere. SPECT 10/5/2018: No EKG evidence for ischemia, no evidence of myocardial ischemia or scar       Assessment/Plan  1. Palpitations    2. Mitral valve insufficiency, unspecified etiology    3. Essential hypertension    4. Hyperlipidemia, unspecified hyperlipidemia type    5. Abdominal aortic aneurysm dissection (HCC)    6. Stenosis of carotid artery, unspecified laterality          Palpitations  Per care everywhere 14 day event monitor 6/14/19  Baseline rhythm is NSR. No significant arrhythmias noted. No pauses. No evidence of SVT or VT. Mitral valve insufficiency  Per echo 11/12/19 - Mild MR. Repeat echo before next visit. Essential hypertension  Controlled~> hypotension   Meds~ on midodrine  Plan~ monitor    Hyperlipidemia  LDL~ 56  Date of last lipid panel~ 2019  Meds~ Lipitor  Plan~ stable    Abdominal aortic aneurysm dissection ([address]  4/21/19: AAA 3.9 cm  Recommend repeating annually  Aortobyfem bypass 8/5/13 by Dr. Cat Roberts ordered. Stenosis of carotid artery  Hx carotid endarterectomy  Carotid US 6/12/13: JEREMIAS 1-18% stenosis, LICA 94-23% stenosis  Repeat duplex. Orders Placed This Encounter   Procedures    VL DUP CAROTID BILATERAL    US DUPLEX SCAN OF AORTA    Echo 2D w doppler w color complete       Clint Griffin MD      Thank you for allowing to me to participate in the care of Amos Faiza. Scribe's Attestation: This note was scribed in the presence of Dr. Carmen Hale MD by Mariza Boateng RN.    I, Dr. Carmen Hale, personally performed the services described in this documentation, as scribed by the above signed scribe in my presence. It is both accurate and complete to my knowledge. I agree with the details independently gathered by the clinical support staff, while the remaining scribed note accurately describes my personal service to the patient. If you have questions, please do not hesitate to call me. I look forward to following Allyssa Uribe along with you.     Sincerely,        Carmen Hale MD

## 2021-02-04 ENCOUNTER — HOSPITAL ENCOUNTER (OUTPATIENT)
Dept: VASCULAR LAB | Age: 81
Discharge: HOME | End: 2021-02-04
Payer: MEDICARE

## 2021-02-04 DIAGNOSIS — I71.02 ABDOMINAL AORTIC ANEURYSM DISSECTION (HCC): ICD-10-CM

## 2021-02-04 DIAGNOSIS — I71.40 ABDOMINAL AORTIC ANEURYSM DISSECTION (HCC): ICD-10-CM

## 2021-02-04 DIAGNOSIS — I65.29 STENOSIS OF CAROTID ARTERY, UNSPECIFIED LATERALITY: ICD-10-CM

## 2021-02-04 PROCEDURE — 93880 EXTRACRANIAL BILAT STUDY: CPT

## 2021-02-04 PROCEDURE — 93975 VASCULAR STUDY: CPT

## 2021-02-05 ENCOUNTER — TELEPHONE (OUTPATIENT)
Dept: CARDIOLOGY CLINIC | Age: 81
End: 2021-02-05

## 2021-02-05 DIAGNOSIS — I71.40 ABDOMINAL AORTIC ANEURYSM DISSECTION (HCC): Primary | ICD-10-CM

## 2021-02-05 DIAGNOSIS — I71.02 ABDOMINAL AORTIC ANEURYSM DISSECTION (HCC): Primary | ICD-10-CM

## 2021-02-05 NOTE — TELEPHONE ENCOUNTER
Attempted to call [facility] for Brayan. Vic Lyons needs to be seen by vascular. A consult to Dr. Cait Villavicencio has been placed so she will need to get an appointment set up to see him. Please let the nurse know.  anshu Stone

## 2021-02-05 NOTE — TELEPHONE ENCOUNTER
Brayan calling from  [facility] needs results from carotid and ultrasound done yesterday  pls fax [phone] pls call to advise thank you

## 2021-04-01 NOTE — PROGRESS NOTES
[facility] [place]   Cardiac Evaluation      Patient: Christiana Valdez  YOB: 1940         Chief Complaint   Patient presents with    Surgical Consult    Follow up    Referring provider: Aida Lewis MD    History of Present Illness:  Ms. Tamara Kong is a 70year old female here for follow up. She has a medical history of aortobifem bypass, carotid endarterectomy, hypotension, hyperlipidemia, diabetes, chronic kidney disease, COPD. She current lives at [facility]. She was seen in December for her yearly follow up (previously saw [facility]) and had an Aortic iliac duplex study that revealed abdominal aneurysm noted 4.72x3.72 as well as carotid dopplers with <50% stenosis bilaterally. A referral was sent to vascular surgery. Williams Stevenson is here today with her son to discuss her Aortic aneurysm. There seems to be confusion with her nursing home [facility] on where she was to be seen. She needs to be seen by vascular surgery and a new referral has been printed.      With regard to medication therapy he/she has been compliant with prescribed regimen and has tolerated therapy to date.     Past Medical History:   has a past medical history of Abdominal aneurysm ([address] Anemia, Anesthesia complication, Aortic aneurysm ([address] Arthritis, At risk for falls, Ataxia, Avascular necrosis ([address] Back pain, Bipolar 1 disorder ([address] Blood transfusion, CAD (coronary artery disease), Chronic kidney disease, Clostridium difficile infection, Colitis due to Clostridium difficile, Confusion, COPD (chronic obstructive pulmonary disease) ([address] Dementia ([address] Depression, Diabetes ([address] Diabetes mellitus ([address] Diarrhea, Dysuria, Fracture of right acetabulum ([address] GERD (gastroesophageal reflux disease), Gout, H/O migraine, Hyperlipidemia, Hypertension, Liver disease, Major depressive disorder, Neuropathy, Other disorders of kidney and ureter, Pneumonia, Prolonged emergence from general anesthesia, Shoulder (girdle) dystocia during labor and deliver, delivered, Type II or unspecified type diabetes mellitus without mention of complication, not stated as uncontrolled, UTI (urinary tract infection), Vitamin D deficiency, Weakness, and Wrist fracture. Surgical History:   has a past surgical history that includes Kidney stone surgery; Hysterectomy; Tonsillectomy; back surgery; Carotid endarterectomy (5/16/11); Colonoscopy; Abdomen surgery; fracture surgery; Appendectomy (1960); Cholecystectomy; vascular surgery; eye surgery; ERCP (2-1-2013); Abdominal aortic aneurysm repair (8/5/2013); Upper gastrointestinal endoscopy (4-28-14); Wrist surgery (Right, 2/12/16); joint replacement; joint replacement (Left, 09/28/2016); Endoscopy, colon, diagnostic; shoulder surgery (Right, 05/02/2017); and pr total knee arthroplasty (Right, 10/23/2018). Current Outpatient Medications   Medication Sig Dispense Refill    bethanechol (URECHOLINE) 25 MG tablet       donepezil (ARICEPT) 5 MG tablet       lactobacillus (CULTURELLE) CAPS capsule Take 1 capsule by mouth 2 times daily      atorvastatin (LIPITOR) 10 MG tablet Take 10 mg by mouth nightly      calcium-vitamin D (OSCAL-500) 500-200 MG-UNIT per tablet Take 1 tablet by mouth 2 times daily      potassium chloride (KLOR-CON M) 20 MEQ extended release tablet Take 20 mEq by mouth 2 times daily      risperiDONE (RISPERDAL) 0.25 MG tablet Take 0.25 mg by mouth 2 times daily      pantoprazole (PROTONIX) 40 MG tablet Take 40 mg by mouth nightly      traMADol (ULTRAM) 50 MG tablet Take 50 mg by mouth every 6 hours as needed for Pain.  miconazole (MICOTIN) 2 % powder Apply topically 2 times daily.  45 g 1    aspirin 81 MG chewable tablet Take 81 mg by mouth daily      magnesium oxide (MAG-OX) 400 MG tablet Take 400 mg by mouth daily      acetaminophen (TYLENOL) 325 MG tablet Take 650 mg by mouth every 6 hours as needed for Pain      midodrine (PROAMATINE) 10 MG tablet Take 1 tablet by mouth 3 times daily (with meals) 90 tablet 3    alendronate (FOSAMAX) 70 MG tablet Take 1 tablet by mouth every 7 days 4 tablet 0    escitalopram (LEXAPRO) 10 MG tablet Take 10 mg by mouth daily      metFORMIN (GLUCOPHAGE) 500 MG tablet Take 500 mg by mouth 2 times daily (with meals)      LYRICA 75 MG capsule TAKE ONE CAPSULE BY MOUTH TWICE DAILY 180 capsule 0    allopurinol (ZYLOPRIM) 300 MG tablet TAKE 1 TABLET BY MOUTH ONCE DAILY 90 tablet 0     No current facility-administered medications for this visit. Allergies:  Clindamycin/lincomycin, Penicillins, Ambien [zolpidem tartrate], Chantix [varenicline], Pentazocine lactate, Sulfa antibiotics, and Zetia [ezetimibe]     Social History:  Social History     Socioeconomic History    Marital status:       Spouse name: Not on file    Number of children: 2    Years of education: 15    Highest education level: Not on file   Occupational History    Not on file   Social Needs    Financial resource strain: Not on file    Food insecurity     Worry: Not on file     Inability: Not on file    Transportation needs     Medical: Not on file     Non-medical: Not on file   Tobacco Use    Smoking status: Former Smoker     Packs/day: 0.10     Years: 50.00     Pack years: 5.00     Types: Cigarettes     Quit date: 2015     Years since quittin.3    Smokeless tobacco: Never Used   Substance and Sexual Activity    Alcohol use: No     Alcohol/week: 0.0 standard drinks     Comment: rare    Drug use: No    Sexual activity: Not Currently   Lifestyle    Physical activity     Days per week: Not on file     Minutes per session: Not on file    Stress: Not on file   Relationships    Social connections     Talks on phone: Not on file     Gets together: Not on file     Attends Jain service: Not on file     Active member of club or organization: Not on file     Attends meetings of clubs or organizations: Not on file     Relationship status: Not on file    Intimate partner violence     Fear of current or ex partner: Not on file     Emotionally abused: Not on file     Physically abused: Not on file     Forced sexual activity: Not on file   Other Topics Concern    Not on file   Social History Narrative    Not on file       Family History:   Family History   Problem Relation Age of Onset    Early Death Father     Substance Abuse Father     High Blood Pressure Sister     High Blood Pressure Brother     Dementia Brother     High Blood Pressure Sister     Diabetes Sister     High Blood Pressure Sister     Diabetes Mother     Cancer Mother      Family history has been reviewed and not pertinent except as noted above. Review of Systems:   · Constitutional: there has been no unanticipated weight loss. No change in energy or activity level   · Eyes: No visual changes   · ENT: No Headaches, hearing loss or vertigo. No mouth sores or sore throat. · Cardiovascular: Reviewed in HPI  · Respiratory: No cough or wheezing, no sputum production. · Gastrointestinal: No abdominal pain, appetite loss, blood in stools. No change in bowel or bladder habits. · Genitourinary: No nocturia, dysuria, trouble voiding  · Musculoskeletal:  No gait disturbance, weakness or joint complaints. · Integumentary: No rash or pruritis. · Neurological: No headache, change in muscle strength, numbness or tingling. No change in gait, balance, coordination, mood, affect, memory, mentation, behavior. · Psychiatric: No anxiety or depression  · Endocrine: No malaise or fever  · Hematologic/Lymphatic: No abnormal bruising or bleeding, blood clots or swollen lymph nodes. · Allergic/Immunologic: No nasal congestion or hives. Physical Examination:    Vitals:    04/09/21 1355   BP: 92/60   Site: Right Upper Arm   Pulse: 110   Resp: 14   Temp: 96.8 °F (36 °C)   SpO2: 93%   Weight: 131 lb 1.6 oz (59.5 kg)   Height: 5' 3\" (1.6 m)     Body mass index is 23.22 kg/m².      Wt Readings from Last 3 Encounters:   04/09/21 131 lb 1.6 oz (59.5 kg)   12/16/20 132 lb (59.9 kg)   11/22/19 136 lb 3.2 oz (61.8 kg)      BP Readings from Last 3 Encounters:   04/09/21 92/60   12/16/20 95/67   11/22/19 105/65        Physical Examination:    · CONSTITUTIONAL: Well developed, well nourished  · EYES: PERRLA. No xanthelasma, sclera non icteric  · EARS,NOSE,MOUTH,THROAT:  Mucous membranes moist, normal hearing  · NECK: Supple, JVP normal, thyroid not enlarged. Carotids 2+ without bruits  · RESPIRATORY: Normal effort, no rales or rhonchi  · CARDIOVASCULAR: Normal PMI, regular rate and rhythm, no murmurs, rub or gallop. No edema. Radial pulses present and equal  · CHEST: No scar or masses  · ABDOMEN: Normal bowel sounds. No masses or tenderness. No bruit  · MUSCULOSKELETAL: No clubbing or cyanosis. Moves all extremities well. Normal gait  · SKIN:  Warm and dry. No rashes  · NEUROLOGIC: Cranial nerves intact. Alert and oriented  · PSYCHIATRIC: Calm affect. Appears to have normal judgement and insight    All testing and labs listed below were personally reviewed by myself. VL AORTA 2/4/21  Summary   Saccular aneurysm noted measuring 4.72x3.72. Iliac arteries are patent. Recommendations    Further imaging may be warranted       Carotid doppler 2/4/21  Summary   There is <50% stenosis of the bilateral internal carotid arteries. Vertebral flow are antegrade bilaterally. Echo 11/21/19  Summary   Normal left ventricle size, wall thickness, and systolic function with an   estimated ejection fraction of 50-55%. Grade I diastolic dysfunction with normal LV filling pressures. Mild mitral regurgitation. Mild aortic regurgitation. 14 day event monitor 6/14/19  Baseline rhythm is NSR. No significant arrhythmias noted. No pauses. No evidence of SVT or VT. Per care everywhere. SPECT 10/5/2018: No EKG evidence for ischemia, no evidence of myocardial ischemia or scar       Assessment/Plan  1.  Preop cardiovascular exam    2. Mitral valve insufficiency, unspecified etiology    3. Abdominal aortic aneurysm dissection ([address]    4. Essential hypertension    5. Hyperlipidemia, unspecified hyperlipidemia type    6. Stenosis of carotid artery, unspecified laterality            Orders Placed This Encounter   Procedures    External Referral To Vascular Surgery       Eileen Monk MD    Follow up in December '21 as scheduled with Echo    Thank you for allowing to me to participate in the care of Christiana Valdez. Scribe's Attestation: This note was scribed in the presence of Dr. Yanet Bonilla MD by Sandie Martins RN.    I, Dr. Yanet Bonilla, personally performed the services described in this documentation, as scribed by the above signed scribe in my presence. It is both accurate and complete to my knowledge. I agree with the details independently gathered by the clinical support staff, while the remaining scribed note accurately describes my personal service to the patient.

## 2021-04-08 PROBLEM — Z01.810 PREOP CARDIOVASCULAR EXAM: Status: ACTIVE | Noted: 2021-04-08

## 2021-04-08 NOTE — ASSESSMENT & PLAN NOTE
Type of procedure? METS>4 or <4  Risk assessment-> low, intermediate or high  Preop testing-> Y or N and what? Aspirin->Continue or hold? Plavix-> Continue or hold?   Coumadin-> hold 5 days  Eliquis/ Xarelto-> hold 3 days

## 2021-04-08 NOTE — PATIENT INSTRUCTIONS
You need to follow up with Vascular surgery - Dr Bobby Juarez - to discuss further plans on your aneurysm.

## 2021-04-09 ENCOUNTER — OFFICE VISIT (OUTPATIENT)
Dept: CARDIOLOGY CLINIC | Age: 81
End: 2021-04-09
Payer: MEDICARE

## 2021-04-09 ENCOUNTER — TELEPHONE (OUTPATIENT)
Dept: CARDIOLOGY CLINIC | Age: 81
End: 2021-04-09

## 2021-04-09 VITALS
OXYGEN SATURATION: 93 % | HEART RATE: 110 BPM | DIASTOLIC BLOOD PRESSURE: 60 MMHG | RESPIRATION RATE: 14 BRPM | WEIGHT: 131.1 LBS | HEIGHT: 63 IN | TEMPERATURE: 96.8 F | SYSTOLIC BLOOD PRESSURE: 92 MMHG | BODY MASS INDEX: 23.23 KG/M2

## 2021-04-09 DIAGNOSIS — Z01.810 PREOP CARDIOVASCULAR EXAM: Primary | ICD-10-CM

## 2021-04-09 DIAGNOSIS — I10 ESSENTIAL HYPERTENSION: ICD-10-CM

## 2021-04-09 DIAGNOSIS — I34.0 MITRAL VALVE INSUFFICIENCY, UNSPECIFIED ETIOLOGY: ICD-10-CM

## 2021-04-09 DIAGNOSIS — E78.5 HYPERLIPIDEMIA, UNSPECIFIED HYPERLIPIDEMIA TYPE: ICD-10-CM

## 2021-04-09 DIAGNOSIS — I71.02 ABDOMINAL AORTIC ANEURYSM DISSECTION (HCC): ICD-10-CM

## 2021-04-09 DIAGNOSIS — I71.40 ABDOMINAL AORTIC ANEURYSM DISSECTION (HCC): ICD-10-CM

## 2021-04-09 DIAGNOSIS — I65.29 STENOSIS OF CAROTID ARTERY, UNSPECIFIED LATERALITY: ICD-10-CM

## 2021-04-09 PROCEDURE — 1123F ACP DISCUSS/DSCN MKR DOCD: CPT | Performed by: INTERNAL MEDICINE

## 2021-04-09 PROCEDURE — G8427 DOCREV CUR MEDS BY ELIG CLIN: HCPCS | Performed by: INTERNAL MEDICINE

## 2021-04-09 PROCEDURE — 99213 OFFICE O/P EST LOW 20 MIN: CPT | Performed by: INTERNAL MEDICINE

## 2021-04-09 PROCEDURE — 4040F PNEUMOC VAC/ADMIN/RCVD: CPT | Performed by: INTERNAL MEDICINE

## 2021-04-09 PROCEDURE — 1036F TOBACCO NON-USER: CPT | Performed by: INTERNAL MEDICINE

## 2021-04-09 PROCEDURE — G8399 PT W/DXA RESULTS DOCUMENT: HCPCS | Performed by: INTERNAL MEDICINE

## 2021-04-09 PROCEDURE — G8420 CALC BMI NORM PARAMETERS: HCPCS | Performed by: INTERNAL MEDICINE

## 2021-04-09 PROCEDURE — 1090F PRES/ABSN URINE INCON ASSESS: CPT | Performed by: INTERNAL MEDICINE

## 2021-04-09 RX ORDER — DONEPEZIL HYDROCHLORIDE 5 MG/1
TABLET, FILM COATED ORAL
COMMUNITY
Start: 2021-03-19

## 2021-04-09 RX ORDER — BETHANECHOL CHLORIDE 25 MG/1
TABLET ORAL
COMMUNITY
Start: 2021-04-03

## 2021-04-09 NOTE — TELEPHONE ENCOUNTER
Received call back from Carlos from [facility]. She is aware of the vascular consult. Phone number given to her for Dr Cole Even office. She will call today to try to get an appointment.

## 2021-05-08 PROBLEM — Z01.810 PREOP CARDIOVASCULAR EXAM: Status: RESOLVED | Noted: 2021-04-08 | Resolved: 2021-05-08

## 2021-05-11 ENCOUNTER — OFFICE VISIT (OUTPATIENT)
Dept: VASCULAR SURGERY | Age: 81
End: 2021-05-11
Payer: MEDICARE

## 2021-05-11 VITALS
BODY MASS INDEX: 24.66 KG/M2 | DIASTOLIC BLOOD PRESSURE: 64 MMHG | SYSTOLIC BLOOD PRESSURE: 118 MMHG | HEIGHT: 62 IN | WEIGHT: 134 LBS

## 2021-05-11 DIAGNOSIS — I71.40 AAA (ABDOMINAL AORTIC ANEURYSM) WITHOUT RUPTURE: Primary | ICD-10-CM

## 2021-05-11 PROCEDURE — 4040F PNEUMOC VAC/ADMIN/RCVD: CPT | Performed by: SURGERY

## 2021-05-11 PROCEDURE — 99203 OFFICE O/P NEW LOW 30 MIN: CPT | Performed by: SURGERY

## 2021-05-11 PROCEDURE — G8420 CALC BMI NORM PARAMETERS: HCPCS | Performed by: SURGERY

## 2021-05-11 PROCEDURE — 1036F TOBACCO NON-USER: CPT | Performed by: SURGERY

## 2021-05-11 PROCEDURE — 1090F PRES/ABSN URINE INCON ASSESS: CPT | Performed by: SURGERY

## 2021-05-11 PROCEDURE — G8427 DOCREV CUR MEDS BY ELIG CLIN: HCPCS | Performed by: SURGERY

## 2021-05-11 PROCEDURE — G8399 PT W/DXA RESULTS DOCUMENT: HCPCS | Performed by: SURGERY

## 2021-05-11 PROCEDURE — 1123F ACP DISCUSS/DSCN MKR DOCD: CPT | Performed by: SURGERY

## 2021-05-11 NOTE — PROGRESS NOTES
[facility]  Consultation Note    Chief Complaint / Reason for Consultation  AAA    History of Present Illness  Patient is a 80 y.o. female with history of previous aortobifemoral bypass, carotid endarterectomy, hyperlipidemia and chronic kidney disease, referred for evaluation of an infrarenal aortic aneurysm. She is unclear of the details of her previous surgeries, as is her son, who is present. She denies abdominal or back pain. Stays at [facility] extended care. She is minimally ambulatory. Review of Systems     Denies fevers, chills, chest pain, shortness of breath, nausea, vomiting, hematemesis, diarrhea, constipation, melena, hematochezia, wt changes, vision problems, blindness, hearing problems, facial droop, slurred speech, extremity weakness, extremity numbness, dysuria.     Past Medical History:   Diagnosis Date    Abdominal aneurysm ([address]     Anemia     Anesthesia complication     pt got confused after anes    Aortic aneurysm ([address]     unspecified site    Arthritis     At risk for falls 09/11/2018    per pt and pt's son subjective reports     Ataxia     Avascular necrosis ([address]     bilateral shoulders    Back pain     Bipolar 1 disorder ([address]     Blood transfusion 2003, 2009    during knee and hip replacement    CAD (coronary artery disease)     Chronic kidney disease     multiple calcium kidney stones 10 years ago    Clostridium difficile infection 08/2011; 1/21/13 4/29/14; 6/25/14    Colitis due to Clostridium difficile 4-    Confusion 5/18/2012    COPD (chronic obstructive pulmonary disease) ([address]     Dementia ([address]     Depression     Diabetes ([address]     Diabetes mellitus ([address]     Diarrhea     Dysuria     Fracture of right acetabulum (HCC)     posterior column    GERD (gastroesophageal reflux disease)     Gout     H/O migraine     Hyperlipidemia     Hypertension     Liver disease     Major depressive disorder     Neuropathy  Other disorders of kidney and ureter     kidney stones    Pneumonia     2009    Prolonged emergence from general anesthesia     Shoulder (girdle) dystocia during labor and deliver, delivered     Type II or unspecified type diabetes mellitus without mention of complication, not stated as uncontrolled     UTI (urinary tract infection)     Vitamin D deficiency     Weakness     Wrist fracture     right       Past Surgical History:   Procedure Laterality Date    ABDOMEN SURGERY      ABDOMINAL AORTIC ANEURYSM REPAIR  8/5/2013    aorto bifemoral bypass    APPENDECTOMY  1960    BACK SURGERY      CAROTID ENDARTERECTOMY  5/16/11    right    CHOLECYSTECTOMY      COLONOSCOPY      diverticulitis; bowel surgery    ENDOSCOPY, COLON, DIAGNOSTIC      egd-dilated esoughagous    ERCP  2-1-2013    ercp with stent placement    EYE SURGERY      cataract b/l    FRACTURE SURGERY      left elbow 1992    HYSTERECTOMY      JOINT REPLACEMENT      rt hip and lt knee    JOINT REPLACEMENT Left 09/28/2016    sholuder    KIDNEY STONE SURGERY      removed abcess lt kidney and stone    IA TOTAL KNEE ARTHROPLASTY Right 10/23/2018    RIGHT TOTAL KNEE ARTHROPLASTY - SYED STANDARD performed by Albertina Capone MD at [address] Right 05/02/2017    R reverse TSA with bone grafting of complex glenoid deficiency with biceps tenodesis    TONSILLECTOMY      as child    UPPER GASTROINTESTINAL ENDOSCOPY  4-28-14    VASCULAR SURGERY      WRIST SURGERY Right 2/12/16    ORIF right distal radius       Allergies   Allergen Reactions    Clindamycin/Lincomycin Diarrhea     Hx of C.diff, Hx of fecal transplant, clindamycin contraindicated always.     Penicillins Hives    Ambien [Zolpidem Tartrate] Other (See Comments)     confusion    Chantix [Varenicline]      Visual and auditory hallucinations    Pentazocine Lactate Other (See Comments)     Severe headache    Sulfa Antibiotics Hives    Zetia [Ezetimibe] Itching       Social History     Socioeconomic History    Marital status:       Spouse name: Not on file    Number of children: 2    Years of education: 15    Highest education level: Not on file   Occupational History    Not on file   Social Needs    Financial resource strain: Not on file    Food insecurity     Worry: Not on file     Inability: Not on file    Transportation needs     Medical: Not on file     Non-medical: Not on file   Tobacco Use    Smoking status: Former Smoker     Packs/day: 0.10     Years: 50.00     Pack years: 5.00     Types: Cigarettes     Quit date: 2015     Years since quittin.4    Smokeless tobacco: Never Used   Substance and Sexual Activity    Alcohol use: No     Alcohol/week: 0.0 standard drinks     Comment: rare    Drug use: No    Sexual activity: Not Currently   Lifestyle    Physical activity     Days per week: Not on file     Minutes per session: Not on file    Stress: Not on file   Relationships    Social connections     Talks on phone: Not on file     Gets together: Not on file     Attends Congregation service: Not on file     Active member of club or organization: Not on file     Attends meetings of clubs or organizations: Not on file     Relationship status: Not on file    Intimate partner violence     Fear of current or ex partner: Not on file     Emotionally abused: Not on file     Physically abused: Not on file     Forced sexual activity: Not on file   Other Topics Concern    Not on file   Social History Narrative    Not on file       Family History   Problem Relation Age of Onset    Early Death Father     Substance Abuse Father     High Blood Pressure Sister     High Blood Pressure Brother     Dementia Brother     High Blood Pressure Sister     Diabetes Sister     High Blood Pressure Sister     Diabetes Mother     Cancer Mother      - No history of bleeding or clotting disorders    Vital Signs  There were no vitals filed for this visit.    Physical Examination  General:  no apparent distress  Psychiatric: affect appropriate  Head/Eyes/Ears/Nose/Throat:  Atraumatic, vision and hearing intact, face symmetric  Neck:  supple  Chest/Lungs: clear to auscultation bilaterally  Cardiac:  Regular rate and rhythm  Abdomen: soft, nontender  Extremities: warm and well perfused  - bilateral upper extremity motorsensory intact  - bilateral lower extremity motorsensory intact  Vascular exam:  - R femoral: 1  - L femoral: 1  - R DP: 1  - L DP: 1  - R PT: 1  - L PT: 1      Labs  Lab Results   Component Value Date    WBC 4.4 11/22/2019    HGB 11.6 11/22/2019    HCT 36.0 11/22/2019    .9 11/22/2019     11/22/2019     Lab Results   Component Value Date     11/22/2019    K 4.6 11/22/2019    K 4.6 11/22/2019     11/22/2019    CO2 22 11/22/2019    PHOS 3.6 09/15/2014    BUN 13 11/22/2019    CREATININE 0.6 11/22/2019      No components found for: GLU    Imaging:            Unilateral ImpressionDuplex imaging of the abdominal aorta reveals a saccular   abdominal aortic aneurysm in the the [id number] abdominal aorta measuring   3.72 cm in the longitudinal imaging plane. A diameter measurement of 4.72 cm   was obtained in the transverse imaging orientation however, this measurement   is highly inaccurate due to the saccular nature of aneurysm. Mild diffuse   atherosclerotic plaque is noted throughout the abdominal aorta. The common and   external iliac arteries appear to be patent. No significant arterial flow   obstructions noted. Multiphasic Doppler flow signals noted.             Assessment:   Asymptomatic infrarenal abdominal aortic aneurysm  Mild asymptomatic carotid atherosclerosis      Plan:    1. AAA (abdominal aortic aneurysm) without rupture [facility])  59-year-old female with saccular infrarenal abdominal aortic aneurysm. Have recommended CT scan to confirm size.   There is reports that she's had an aortobifemoral bypass, however cannot find records of it and son is not sure that she's had this. There is also record that she had carotid endarterectomy but does not have any surgical scars. We'll contact with results of the CT scan. Will need to have a significant risk-benefit discussion with patient and family should her aneurysm be greater than 5 cm.  - CTA ABDOMEN PELVIS W WO CONTRAST; Future          Dante Heath M.D., FACS.   5/11/2021  11:46 AM

## 2021-05-26 ENCOUNTER — TELEPHONE (OUTPATIENT)
Dept: VASCULAR SURGERY | Age: 81
End: 2021-05-26

## 2021-05-26 DIAGNOSIS — I71.40 AAA (ABDOMINAL AORTIC ANEURYSM) WITHOUT RUPTURE: Primary | ICD-10-CM

## 2021-05-27 ENCOUNTER — HOSPITAL ENCOUNTER (OUTPATIENT)
Age: 81
Discharge: HOME | End: 2021-05-27
Payer: MEDICARE

## 2021-05-27 ENCOUNTER — HOSPITAL ENCOUNTER (OUTPATIENT)
Dept: CT IMAGING | Age: 81
Discharge: HOME | End: 2021-05-27
Payer: MEDICARE

## 2021-05-27 DIAGNOSIS — I71.40 AAA (ABDOMINAL AORTIC ANEURYSM) WITHOUT RUPTURE: ICD-10-CM

## 2021-05-27 LAB
BUN BLDV-MCNC: 20 MG/DL (ref 7–20)
CREAT SERPL-MCNC: 0.7 MG/DL (ref 0.6–1.2)
GFR AFRICAN AMERICAN: >60
GFR NON-AFRICAN AMERICAN: >60

## 2021-05-27 PROCEDURE — 74174 CTA ABD&PLVS W/CONTRAST: CPT

## 2021-05-27 PROCEDURE — 84520 ASSAY OF UREA NITROGEN: CPT

## 2021-05-27 PROCEDURE — 6360000004 HC RX CONTRAST MEDICATION: Performed by: SURGERY

## 2021-05-27 PROCEDURE — 82565 ASSAY OF CREATININE: CPT

## 2021-05-27 PROCEDURE — 36415 COLL VENOUS BLD VENIPUNCTURE: CPT

## 2021-05-27 RX ADMIN — IOPAMIDOL 75 ML: 755 INJECTION, SOLUTION INTRAVENOUS at 09:27

## 2021-06-05 ENCOUNTER — TELEPHONE (OUTPATIENT)
Dept: VASCULAR SURGERY | Age: 81
End: 2021-06-05

## 2021-06-05 NOTE — TELEPHONE ENCOUNTER
Left message with nurse at nursing facility and left message with her power of . She has history of previous aortobifemoral bypass. 4.6 cm aneurysm. Given current functional status would not recommend follow-up imaging as she would not be candidate for repeat open operation.

## 2021-12-17 ENCOUNTER — TELEPHONE (OUTPATIENT)
Dept: CARDIOLOGY CLINIC | Age: 81
End: 2021-12-17

## 2021-12-17 ENCOUNTER — HOSPITAL ENCOUNTER (OUTPATIENT)
Dept: NON INVASIVE DIAGNOSTICS | Age: 81
Discharge: HOME | End: 2021-12-17
Payer: MEDICARE

## 2021-12-17 LAB
LV EF: 58 %
LVEF MODALITY: NORMAL

## 2021-12-17 PROCEDURE — 93306 TTE W/DOPPLER COMPLETE: CPT

## 2021-12-20 ENCOUNTER — TELEPHONE (OUTPATIENT)
Dept: CARDIOLOGY CLINIC | Age: 81
End: 2021-12-20

## 2021-12-21 NOTE — TELEPHONE ENCOUNTER
----- Message from Aung Knox RN sent at 12/20/2021  1:41 PM EST -----  Can you let her know her echo looks good and see if she wants to reschedule her office visit from last week that she missed?  Thank you
Please add Patricia to the recall list for psc for April '22.  Thank you
Pt was added to [facility] recall list 04/2022
Detail Level: Detailed
Detail Level: Generalized
Detail Level: Zone
Moisturizer Recommendations: Eucerin advanced repair cream

## 2022-05-19 ENCOUNTER — HOSPITAL ENCOUNTER (OUTPATIENT)
Dept: GENERAL RADIOLOGY | Age: 82
Discharge: HOME | End: 2022-05-19
Payer: MEDICARE

## 2022-05-19 ENCOUNTER — HOSPITAL ENCOUNTER (OUTPATIENT)
Dept: CT IMAGING | Age: 82
Discharge: HOME | End: 2022-05-19
Payer: MEDICARE

## 2022-05-19 DIAGNOSIS — Z13.6 ENCOUNTER FOR ABDOMINAL AORTIC ANEURYSM SCREENING: ICD-10-CM

## 2022-05-19 DIAGNOSIS — M81.0 SENILE OSTEOPOROSIS: ICD-10-CM

## 2022-05-19 PROCEDURE — 74177 CT ABD & PELVIS W/CONTRAST: CPT

## 2022-05-19 PROCEDURE — 77080 DXA BONE DENSITY AXIAL: CPT

## 2022-05-19 PROCEDURE — 6360000004 HC RX CONTRAST MEDICATION: Performed by: NURSE PRACTITIONER

## 2022-05-19 RX ADMIN — IOPAMIDOL 75 ML: 755 INJECTION, SOLUTION INTRAVENOUS at 13:29

## 2022-05-19 RX ADMIN — IOHEXOL 50 ML: 240 INJECTION, SOLUTION INTRATHECAL; INTRAVASCULAR; INTRAVENOUS; ORAL at 13:29

## 2022-10-17 ENCOUNTER — APPOINTMENT (OUTPATIENT)
Dept: CT IMAGING | Age: 82
End: 2022-10-17
Payer: MEDICARE

## 2022-10-17 ENCOUNTER — APPOINTMENT (OUTPATIENT)
Dept: GENERAL RADIOLOGY | Age: 82
End: 2022-10-17
Payer: MEDICARE

## 2022-10-17 ENCOUNTER — HOSPITAL ENCOUNTER (EMERGENCY)
Age: 82
Discharge: NURSING, REHAB OR LONG-TERM CARE | End: 2022-10-17
Payer: MEDICARE

## 2022-10-17 VITALS
RESPIRATION RATE: 16 BRPM | HEIGHT: 63 IN | DIASTOLIC BLOOD PRESSURE: 69 MMHG | WEIGHT: 135 LBS | BODY MASS INDEX: 23.92 KG/M2 | HEART RATE: 69 BPM | TEMPERATURE: 97.9 F | OXYGEN SATURATION: 95 % | SYSTOLIC BLOOD PRESSURE: 103 MMHG

## 2022-10-17 DIAGNOSIS — M25.552 LEFT HIP PAIN: ICD-10-CM

## 2022-10-17 DIAGNOSIS — N30.00 ACUTE CYSTITIS WITHOUT HEMATURIA: ICD-10-CM
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
## 2022-10-17 LAB
A/G RATIO: 1.5 (ref 1.1–2.2)
ABO/RH: NORMAL
ALBUMIN SERPL-MCNC: 4.4 G/DL (ref 3.4–5)
ALP BLD-CCNC: 94 U/L (ref 40–129)
ALT SERPL-CCNC: <5 U/L (ref 10–40)
ANION GAP SERPL CALCULATED.3IONS-SCNC: 8 MMOL/L (ref 3–16)
ANTIBODY IDENTIFICATION: NORMAL
ANTIBODY SCREEN: NORMAL
APTT: 31.8 SEC (ref 23–34.3)
AST SERPL-CCNC: 17 U/L (ref 15–37)
BACTERIA: ABNORMAL /HPF
BASOPHILS ABSOLUTE: 0 K/UL (ref 0–0.2)
BASOPHILS RELATIVE PERCENT: 0.6 %
BILIRUB SERPL-MCNC: 0.4 MG/DL (ref 0–1)
BILIRUBIN URINE: NEGATIVE
BLOOD, URINE: ABNORMAL
BUN BLDV-MCNC: 24 MG/DL (ref 7–20)
CALCIUM SERPL-MCNC: 10 MG/DL (ref 8.3–10.6)
CHLORIDE BLD-SCNC: 103 MMOL/L (ref 99–110)
CLARITY: CLEAR
CO2: 28 MMOL/L (ref 21–32)
COLOR: YELLOW
CREAT SERPL-MCNC: 0.9 MG/DL (ref 0.6–1.2)
DAT IGG CAPTURE: NORMAL
EOSINOPHILS ABSOLUTE: 0.2 K/UL (ref 0–0.6)
EOSINOPHILS RELATIVE PERCENT: 4.4 %
EPITHELIAL CELLS, UA: 1 /HPF (ref 0–5)
FYA ANTIGEN: NORMAL
GFR AFRICAN AMERICAN: >60
GFR NON-AFRICAN AMERICAN: >60
GLUCOSE BLD-MCNC: 99 MG/DL (ref 70–99)
GLUCOSE URINE: NEGATIVE MG/DL
HCT VFR BLD CALC: 38.3 % (ref 36–48)
HEMOGLOBIN: 12.5 G/DL (ref 12–16)
HYALINE CASTS: 0 /LPF (ref 0–8)
INR BLD: 1.05 (ref 0.87–1.14)
KETONES, URINE: NEGATIVE MG/DL
LEUKOCYTE ESTERASE, URINE: NEGATIVE
LYMPHOCYTES ABSOLUTE: 1.3 K/UL (ref 1–5.1)
LYMPHOCYTES RELATIVE PERCENT: 33.6 %
MCH RBC QN AUTO: 33.2 PG (ref 26–34)
MCHC RBC AUTO-ENTMCNC: 32.5 G/DL (ref 31–36)
MCV RBC AUTO: 101.9 FL (ref 80–100)
MICROSCOPIC EXAMINATION: YES
MONOCYTES ABSOLUTE: 0.4 K/UL (ref 0–1.3)
MONOCYTES RELATIVE PERCENT: 9.8 %
NEUTROPHILS ABSOLUTE: 2 K/UL (ref 1.7–7.7)
NEUTROPHILS RELATIVE PERCENT: 51.6 %
NITRITE, URINE: POSITIVE
PDW BLD-RTO: 13.6 % (ref 12.4–15.4)
PH UA: 7 (ref 5–8)
PLATELET # BLD: 120 K/UL (ref 135–450)
PMV BLD AUTO: 7.8 FL (ref 5–10.5)
POTASSIUM REFLEX MAGNESIUM: 4.5 MMOL/L (ref 3.5–5.1)
PROTEIN UA: NEGATIVE MG/DL
PROTHROMBIN TIME: 13.6 SEC (ref 11.7–14.5)
RBC # BLD: 3.76 M/UL (ref 4–5.2)
RBC UA: 3 /HPF (ref 0–4)
SODIUM BLD-SCNC: 139 MMOL/L (ref 136–145)
SPECIFIC GRAVITY UA: 1.01 (ref 1–1.03)
TOTAL PROTEIN: 7.4 G/DL (ref 6.4–8.2)
URINE REFLEX TO CULTURE: ABNORMAL
URINE TYPE: ABNORMAL
UROBILINOGEN, URINE: 0.2 E.U./DL
WBC # BLD: 3.9 K/UL (ref 4–11)
WBC UA: 1 /HPF (ref 0–5)

## 2022-10-17 PROCEDURE — 70450 CT HEAD/BRAIN W/O DYE: CPT

## 2022-10-17 PROCEDURE — 72125 CT NECK SPINE W/O DYE: CPT

## 2022-10-17 PROCEDURE — 80053 COMPREHEN METABOLIC PANEL: CPT

## 2022-10-17 PROCEDURE — 96375 TX/PRO/DX INJ NEW DRUG ADDON: CPT

## 2022-10-17 PROCEDURE — 85025 COMPLETE CBC W/AUTO DIFF WBC: CPT

## 2022-10-17 PROCEDURE — 86900 BLOOD TYPING SEROLOGIC ABO: CPT

## 2022-10-17 PROCEDURE — 81001 URINALYSIS AUTO W/SCOPE: CPT

## 2022-10-17 PROCEDURE — 86880 COOMBS TEST DIRECT: CPT

## 2022-10-17 PROCEDURE — 73700 CT LOWER EXTREMITY W/O DYE: CPT

## 2022-10-17 PROCEDURE — 86905 BLOOD TYPING RBC ANTIGENS: CPT

## 2022-10-17 PROCEDURE — 86850 RBC ANTIBODY SCREEN: CPT

## 2022-10-17 PROCEDURE — 99285 EMERGENCY DEPT VISIT HI MDM: CPT

## 2022-10-17 PROCEDURE — 71045 X-RAY EXAM CHEST 1 VIEW: CPT

## 2022-10-17 PROCEDURE — 85730 THROMBOPLASTIN TIME PARTIAL: CPT

## 2022-10-17 PROCEDURE — 86901 BLOOD TYPING SEROLOGIC RH(D): CPT

## 2022-10-17 PROCEDURE — 86870 RBC ANTIBODY IDENTIFICATION: CPT

## 2022-10-17 PROCEDURE — 96374 THER/PROPH/DIAG INJ IV PUSH: CPT

## 2022-10-17 PROCEDURE — 6360000002 HC RX W HCPCS: Performed by: PHYSICIAN ASSISTANT

## 2022-10-17 PROCEDURE — 85610 PROTHROMBIN TIME: CPT

## 2022-10-17 PROCEDURE — 93005 ELECTROCARDIOGRAM TRACING: CPT | Performed by: PHYSICIAN ASSISTANT

## 2022-10-17 RX ORDER — ONDANSETRON 2 MG/ML
4 INJECTION INTRAMUSCULAR; INTRAVENOUS ONCE
Status: COMPLETED | OUTPATIENT
Start: 2022-10-17 | End: 2022-10-17

## 2022-10-17 RX ORDER — MORPHINE SULFATE 4 MG/ML
4 INJECTION, SOLUTION INTRAMUSCULAR; INTRAVENOUS ONCE
Status: COMPLETED | OUTPATIENT
Start: 2022-10-17 | End: 2022-10-17

## 2022-10-17 RX ORDER — CEPHALEXIN 500 MG/1
500 CAPSULE ORAL 2 TIMES DAILY
Qty: 10 CAPSULE | Refills: 0 | Status: SHIPPED | OUTPATIENT
Start: 2022-10-17 | End: 2022-10-22

## 2022-10-17 RX ADMIN — MORPHINE SULFATE 4 MG: 4 INJECTION, SOLUTION INTRAMUSCULAR; INTRAVENOUS at 15:03

## 2022-10-17 RX ADMIN — ONDANSETRON 4 MG: 2 INJECTION INTRAMUSCULAR; INTRAVENOUS at 15:02

## 2022-10-17 ASSESSMENT — PAIN SCALES - GENERAL
PAINLEVEL_OUTOF10: 1
PAINLEVEL_OUTOF10: 9
PAINLEVEL_OUTOF10: 9

## 2022-10-17 ASSESSMENT — ENCOUNTER SYMPTOMS
COUGH: 0
CONSTIPATION: 0
CHEST TIGHTNESS: 0
DIARRHEA: 0
RESPIRATORY NEGATIVE: 1
NAUSEA: 0
PHOTOPHOBIA: 0
SHORTNESS OF BREATH: 0
BACK PAIN: 0
ABDOMINAL PAIN: 0
VOMITING: 0
COLOR CHANGE: 0

## 2022-10-17 ASSESSMENT — PAIN DESCRIPTION - LOCATION
LOCATION: LEG
LOCATION: LEG

## 2022-10-17 ASSESSMENT — PAIN DESCRIPTION - FREQUENCY: FREQUENCY: CONTINUOUS

## 2022-10-17 ASSESSMENT — PAIN DESCRIPTION - ORIENTATION
ORIENTATION: LEFT
ORIENTATION: LEFT

## 2022-10-17 ASSESSMENT — PAIN DESCRIPTION - DESCRIPTORS
DESCRIPTORS: ACHING
DESCRIPTORS: ACHING

## 2022-10-17 ASSESSMENT — PAIN DESCRIPTION - PAIN TYPE: TYPE: ACUTE PAIN

## 2022-10-17 NOTE — ED TRIAGE NOTES
Pt to ED per EMS from [facility] for suspected broken left leg. Pt arrives with paperwork that states she had an xray done on 10/7/22 that showed occult fracture involving the left femoral subcapital region. Unsure why patient was not sent out then. Pt states she fell yesterday and fell on her left leg, now causing more pain. Pt is A/Ox4, denies hitting head, no blood thinners.

## 2022-10-17 NOTE — ED PROVIDER NOTES
[facility]        Pt Name: Falguni English  MRN: [id number]  [place] 1940  Date of evaluation: 10/17/2022  Provider: CARMELO Workman  PCP: Lucila Burkett MD  Note Started: 5:29 PM EDT       MARISELA. I have evaluated this patient. My supervising physician was available for consultation. CHIEF COMPLAINT       Chief Complaint   Patient presents with    Leg Pain    Fall       HISTORY OF PRESENT ILLNESS   (Location, Timing/Onset, Context/Setting, Quality, Duration, Modifying Factors, Severity, Associated Signs and Symptoms)  Note limiting factors. Chief Complaint: Marco A Hunter is a [de-identified] y.o. female with past medical history of bipolar disorder, CAD, chronic kidney disease, COPD, dementia, diabetes, GERD, hyperlipidemia and hypertension who presents to the ED with complaint of a fall. Patient apparently fell on 10/7/2022. Had x-rays at [facility] of the right hip initially which showed no acute abnormality. Apparently was complained of pain to the left hip so had x-rays done today which showed concern for a left hip fracture. She was sent to the ED for further evaluation and treatment. She has complaint of pain to her left hip. States it is worsened with palpation and movement. She denies any decreased range of motion or strength. Denies any head injury or headache. Denies any neck pain or stiffness. Denies chest pain, shortness of breath, abdominal pain, nausea/vomiting, urinary symptoms or change in bowel moods. Denies any other injury or trauma throughout. Nursing Notes were all reviewed and agreed with or any disagreements were addressed in the HPI. REVIEW OF SYSTEMS    (2-9 systems for level 4, 10 or more for level 5)     Review of Systems   Constitutional:  Negative for activity change, appetite change, chills, diaphoresis, fatigue and fever.    Eyes:  Negative for photophobia and visual Hypertension     Liver disease     Major depressive disorder     Neuropathy     Other disorders of kidney and ureter     kidney stones    Pneumonia     2009    Prolonged emergence from general anesthesia     Shoulder (girdle) dystocia during labor and deliver, delivered     Type II or unspecified type diabetes mellitus without mention of complication, not stated as uncontrolled     UTI (urinary tract infection)     Vitamin D deficiency     Weakness     Wrist fracture     right         SURGICAL HISTORY     Past Surgical History:   Procedure Laterality Date    ABDOMEN SURGERY      ABDOMINAL AORTIC ANEURYSM REPAIR  8/5/2013    aorto bifemoral bypass    APPENDECTOMY  1960    BACK SURGERY      CAROTID ENDARTERECTOMY  5/16/11    right    CHOLECYSTECTOMY      COLONOSCOPY      diverticulitis; bowel surgery    ENDOSCOPY, COLON, DIAGNOSTIC      egd-dilated esoughagous    ERCP  2-1-2013    ercp with stent placement    EYE SURGERY      cataract b/l    FRACTURE SURGERY      left elbow 1992    HYSTERECTOMY (CERVIX STATUS UNKNOWN)      JOINT REPLACEMENT      rt hip and lt knee    JOINT REPLACEMENT Left 09/28/2016    sholuder    KIDNEY STONE SURGERY      removed abcess lt kidney and stone    IA TOTAL KNEE ARTHROPLASTY Right 10/23/2018    RIGHT TOTAL KNEE ARTHROPLASTY - SYED STANDARD performed by Sergio Morgan MD at [facility] Right 05/02/2017    R reverse TSA with bone grafting of complex glenoid deficiency with biceps tenodesis    TONSILLECTOMY      as child    UPPER GASTROINTESTINAL ENDOSCOPY  4-28-14    VASCULAR SURGERY      WRIST SURGERY Right 2/12/16    ORIF right distal radius         CURRENTMEDICATIONS       Previous Medications    ACETAMINOPHEN (TYLENOL) 325 MG TABLET    Take 650 mg by mouth every 6 hours as needed for Pain    ALENDRONATE (FOSAMAX) 70 MG TABLET    Take 1 tablet by mouth every 7 days    ALLOPURINOL (ZYLOPRIM) 300 MG TABLET    TAKE 1 TABLET BY MOUTH ONCE DAILY    ASPIRIN 81 MG CHEWABLE TABLET    Take 81 mg by mouth daily    ATORVASTATIN (LIPITOR) 10 MG TABLET    Take 10 mg by mouth nightly    BETHANECHOL (URECHOLINE) 25 MG TABLET        CALCIUM-VITAMIN D (OSCAL-500) 500-200 MG-UNIT PER TABLET    Take 1 tablet by mouth 2 times daily    DONEPEZIL (ARICEPT) 5 MG TABLET        ESCITALOPRAM (LEXAPRO) 10 MG TABLET    Take 10 mg by mouth daily    LACTOBACILLUS (CULTURELLE) CAPS CAPSULE    Take 1 capsule by mouth 2 times daily    LYRICA 75 MG CAPSULE    TAKE ONE CAPSULE BY MOUTH TWICE DAILY    MAGNESIUM OXIDE (MAG-OX) 400 MG TABLET    Take 400 mg by mouth daily    METFORMIN (GLUCOPHAGE) 500 MG TABLET    Take 500 mg by mouth 2 times daily (with meals)    MICONAZOLE (MICOTIN) 2 % POWDER    Apply topically 2 times daily. MIDODRINE (PROAMATINE) 10 MG TABLET    Take 1 tablet by mouth 3 times daily (with meals)    PANTOPRAZOLE (PROTONIX) 40 MG TABLET    Take 40 mg by mouth nightly    POTASSIUM CHLORIDE (KLOR-CON M) 20 MEQ EXTENDED RELEASE TABLET    Take 20 mEq by mouth 2 times daily    RISPERIDONE (RISPERDAL) 0.25 MG TABLET    Take 0.25 mg by mouth 2 times daily    TRAMADOL (ULTRAM) 50 MG TABLET    Take 50 mg by mouth every 6 hours as needed for Pain.          ALLERGIES     Clindamycin/lincomycin, Penicillins, Ambien [zolpidem tartrate], Chantix [varenicline], Pentazocine lactate, Sulfa antibiotics, and Zetia [ezetimibe]    FAMILYHISTORY       Family History   Problem Relation Age of Onset    Early Death Father     Substance Abuse Father     High Blood Pressure Sister     High Blood Pressure Brother     Dementia Brother     High Blood Pressure Sister     Diabetes Sister     High Blood Pressure Sister     Diabetes Mother     Cancer Mother           SOCIAL HISTORY       Social History     Tobacco Use    Smoking status: Former     Packs/day: 0.10     Years: 50.00     Pack years: 5.00     Types: Cigarettes     Quit date: 2015     Years since quittin.8    Smokeless tobacco: Never   Vaping Use There is no right CVA tenderness, left CVA tenderness, guarding or rebound. Hernia: No hernia is present. Musculoskeletal:         General: Normal range of motion. Cervical back: Normal range of motion and neck supple. No rigidity or tenderness. Comments: No TTP to the midline cervical, thoracic or lumbar spine. No anterior chest wall tenderness. No pelvis instability. Does have tender palpation of the left hip. There is full range of motion and strength to the upper and lower extremities throughout. There is no other tenderness palpation to the upper and lower extremities throughout. Distal neurovascular intact. No leg shortening or rotation noted. Gait deferred. Lymphadenopathy:      Cervical: No cervical adenopathy. Skin:     General: Skin is warm and dry. Coloration: Skin is not pale. Findings: No erythema or rash. Neurological:      General: No focal deficit present. Mental Status: She is alert and oriented to person, place, and time. GCS: GCS eye subscore is 4. GCS verbal subscore is 5. GCS motor subscore is 6. Cranial Nerves: Cranial nerves 2-12 are intact. No cranial nerve deficit, dysarthria or facial asymmetry. Sensory: Sensation is intact. No sensory deficit. Motor: Motor function is intact. No weakness. Comments: Gait deferred.    Psychiatric:         Behavior: Behavior normal.       DIAGNOSTIC RESULTS   LABS:    Labs Reviewed   CBC WITH AUTO DIFFERENTIAL - Abnormal; Notable for the following components:       Result Value    WBC 3.9 (*)     RBC 3.76 (*)     .9 (*)     Platelets 428 (*)     All other components within normal limits   COMPREHENSIVE METABOLIC PANEL W/ REFLEX TO MG FOR LOW K - Abnormal; Notable for the following components:    BUN 24 (*)     ALT <5 (*)     All other components within normal limits   URINALYSIS WITH REFLEX TO CULTURE - Abnormal; Notable for the following components:    Blood, Urine TRACE (*) Nitrite, Urine POSITIVE (*)     All other components within normal limits   MICROSCOPIC URINALYSIS - Abnormal; Notable for the following components:    Bacteria, UA 4+ (*)     All other components within normal limits   PROTIME-INR   APTT   KSASANDRA IGG   ABO/RH   TYPE AND SCREEN       When ordered only abnormal lab results are displayed. All other labs were within normal range or not returned as of this dictation. EKG: When ordered, EKG's are interpreted by the Emergency Department Physician in the absence of a cardiologist.  Please see their note for interpretation of EKG. RADIOLOGY:   Non-plain film images such as CT, Ultrasound and MRI are read by the radiologist. Plain radiographic images are visualized and preliminarily interpreted by the ED Provider with the below findings:        Interpretation per the Radiologist below, if available at the time of this note:    CT HIP LEFT WO CONTRAST   Final Result   No acute fracture or dislocation. Moderate osteoarthritis of the left hip   joint         CT CERVICAL SPINE WO CONTRAST   Final Result   1. New age-indeterminate T2 compression fracture. If there is point   tenderness, recommend further evaluation with MRI of the thoracic spine. 2. Unchanged grade 1 anterolisthesis of C6 on C7.         CT HEAD WO CONTRAST   Preliminary Result   No acute intracranial abnormality. XR CHEST PORTABLE   Final Result   No acute cardiopulmonary disease. CT HEAD WO CONTRAST    Result Date: 10/17/2022  EXAMINATION: CT OF THE HEAD WITHOUT CONTRAST  10/17/2022 2:06 pm TECHNIQUE: CT of the head was performed without the administration of intravenous contrast. Automated exposure control, iterative reconstruction, and/or weight based adjustment of the mA/kV was utilized to reduce the radiation dose to as low as reasonably achievable.  COMPARISON: 06/18/2019 HISTORY: ORDERING SYSTEM PROVIDED HISTORY: fall TECHNOLOGIST PROVIDED HISTORY: Has a \"code stroke\" or \"stroke alert\" been called? ->No Reason for exam:->fall Decision Support Exception - unselect if not a suspected or confirmed emergency medical condition->Emergency Medical Condition (MA) Reason for Exam: fall FINDINGS: BRAIN/VENTRICLES: There is no acute intracranial hemorrhage, mass effect or midline shift. No abnormal extra-axial fluid collection. The gray-white differentiation is maintained without evidence of an acute infarct. There is no evidence of hydrocephalus. There is stable age-appropriate atrophy. There is stable chronic small vessel ischemic white matter disease. There are chronic lacunar infarcts within the bilateral basal ganglia. There is an additional chronic lacunar infarct within the left thalamus. No focus of acute abnormal brain attenuation is identified. ORBITS: The visualized portion of the orbits demonstrate no acute abnormality. SINUSES: The paranasal sinuses are patent and clear. There is partial opacification of the bilateral mastoid air cells. SOFT TISSUES/SKULL:  No acute abnormality of the visualized skull or soft tissues. There is chronic hyperostosis frontalis interna, a normal variant. There is a chronic nasal bone fracture. No acute intracranial abnormality. CT CERVICAL SPINE WO CONTRAST    Result Date: 10/17/2022  EXAMINATION: CT OF THE CERVICAL SPINE WITHOUT CONTRAST 10/17/2022 2:06 pm: TECHNIQUE: CT of the cervical spine was performed without the administration of intravenous contrast. Multiplanar reformatted images are provided for review. Automated exposure control, iterative reconstruction, and/or weight based adjustment of the mA/kV was utilized to reduce the radiation dose to as low as reasonably achievable.  COMPARISON: 05/10/2019 HISTORY: ORDERING SYSTEM PROVIDED HISTORY: fall TECHNOLOGIST PROVIDED HISTORY: Reason for exam:->fall Decision Support Exception - unselect if not a suspected or confirmed emergency medical condition->Emergency Medical Condition (MA) Reason for Exam: fall FINDINGS: BONES/ALIGNMENT: There is a new age-indeterminate compression fracture involving the superior endplate of T2. The bones are demineralized. No change in the grade 1 anterolisthesis of C6 on C7. The craniocervical junction is intact. DEGENERATIVE CHANGES: There is moderate multilevel spondylosis and facet arthropathy. SOFT TISSUES: There is no prevertebral soft tissue swelling. Mild emphysema involves the bilateral upper lungs with biapical scarring. Status post right carotid endarterectomy. 1. New age-indeterminate T2 compression fracture. If there is point tenderness, recommend further evaluation with MRI of the thoracic spine. 2. Unchanged grade 1 anterolisthesis of C6 on C7. XR CHEST PORTABLE    Result Date: 10/17/2022  EXAMINATION: ONE XRAY VIEW OF THE CHEST 10/17/2022 1:43 pm COMPARISON: 11/21/2019 HISTORY: ORDERING SYSTEM PROVIDED HISTORY: fall TECHNOLOGIST PROVIDED HISTORY: Reason for exam:->fall Reason for Exam: fall FINDINGS: Cardiac silhouette is normal.  There is thoracic arch calcification. Hilar contours are normal.  There is no consolidation, pleural effusion or pneumothorax. Bilateral shoulder prostheses are noted. No acute cardiopulmonary disease. CT HIP LEFT WO CONTRAST    Result Date: 10/17/2022  EXAMINATION: CT OF THE LEFT HIP WITHOUT CONTRAST 10/17/2022 2:07 pm TECHNIQUE: CT of the left hip was performed without the administration of intravenous contrast.  Multiplanar reformatted images are provided for review. Automated exposure control, iterative reconstruction, and/or weight based adjustment of the mA/kV was utilized to reduce the radiation dose to as low as reasonably achievable. COMPARISON: CT abdomen/pelvis dated 19 May 2022 HISTORY ORDERING SYSTEM PROVIDED HISTORY: fall - fx? TECHNOLOGIST PROVIDED HISTORY: Reason for exam:->fall - fx?  Decision Support Exception - unselect if not a suspected or confirmed emergency medical condition->Emergency Medical Condition (MA) Reason for Exam: fall - fx? FINDINGS: Bones: The patient is status post right hip arthroplasty. The hardware is intact without complication. No evidence of acute fracture or dislocation. No aggressive appearing osseous abnormality or periostitis. Soft Tissue: No significant soft tissue edema or fluid collections. Joint: There is moderate osteoarthritis of the left hip joint space. There is grade 1 anterolisthesis of L5 over S1. Degenerative disc disease at L4/5 and L5/S1. No acute fracture or dislocation. Moderate osteoarthritis of the left hip joint           PROCEDURES   Unless otherwise noted below, none     Procedures    CRITICAL CARE TIME       CONSULTS:  None      EMERGENCY DEPARTMENT COURSE and DIFFERENTIAL DIAGNOSIS/MDM:   Vitals:    Vitals:    10/17/22 1345 10/17/22 1530 10/17/22 1600 10/17/22 1715   BP: (!) 157/95 123/60 102/85 (!) 103/59   Pulse: 64 54 68 62   Resp: 11 10 13 15   Temp:       TempSrc:       SpO2: 96% 100% 100% 99%   Weight:       Height:           Patient was given the following medications:  Medications   morphine injection 4 mg (4 mg IntraVENous Given 10/17/22 1503)   ondansetron (ZOFRAN) injection 4 mg (4 mg IntraVENous Given 10/17/22 1502)         Is this patient to be included in the SEP-1 Core Measure due to severe sepsis or septic shock? No   Exclusion criteria - the patient is NOT to be included for SEP-1 Core Measure due to: Infection is not suspected    Patient is an 33-year-old female who presents to the ED with complaint of a fall. Apparently had a fall on 10/7/2022. Had x-rays initially of the right hip which showed no acute abnormality. Parents complain of pain to the left hip so an x-ray of the left hip which showed concern for left subcapital femoral neck fracture. Patient has no shortening or rotation noted.   She has some mild pain with palpation of the left hip but actually has really good range of motion and strength. Given the fact that x-ray questioned a hip fracture but clinically on exam has good range of motion and strength without a significant amount of pain I did order CT of the hip for further evaluation. Urinalysis showed positive nitrite with 4+ bacteria. She denies any urinary symptoms but given positive nitrites did order antibiotics for home with Keflex 500 twice daily x5 days. Has received cephalosporins in the past even though has a history of allergy to penicillins. CBC showed normal hemoglobin with platelets of 544 and white blood cell count 3.9. CMP relatively unremarkable. Coags obtained unremarkable. Blood type B+. CT of the left hip showed no fracture or dislocation. Osteoarthritis noted. CT of the cervical spine showed unchanged anterolisthesis of C6/C7. New age-indeterminate T2 compression fracture. There is no point tenderness to the midline cervical, thoracic or lumbar spine. Do not believe this represents acute injury. Do not believe MRI or further imaging indicated at this time. CT of the head showed no acute abnormality. Chest x-ray unremarkable. EKG inter by attending. Believe patient be safely discharged home with close outpatient follow-up. FINAL IMPRESSION      1. Fall, initial encounter    2. Left hip pain    3. Acute cystitis without hematuria          DISPOSITION/PLAN   DISPOSITION Decision To Discharge 10/17/2022 05:01:57 PM      PATIENT REFERRED TO:  Ada Ordonez MD  [address] [facility] [facility] [address]  [phone]    Schedule an appointment as soon as possible for a visit   For a Re-check in  3-5    days.     [facility] Emergency Department  [address] [facility]  [address] [facility] [address]  [phone]  Go to   As needed, If symptoms worsen      DISCHARGE MEDICATIONS:  New Prescriptions    CEPHALEXIN (KEFLEX) 500 MG CAPSULE    Take 1 capsule by mouth 2 times daily for 5 days       DISCONTINUED MEDICATIONS:  Discontinued Medications    No medications on file              (Please note that portions of this note were completed with a voice recognition program.  Efforts were made to edit the dictations but occasionally words are mis-transcribed.)    CARMELO Lopez (electronically signed)          CARMELO Roque  10/17/22 [phone]

## 2022-10-17 NOTE — ED NOTES
Pt back to [facility] via ambulance at this time. PIV removed and bleeding controlled. Stable upon dismissal. Attempted to call report back to [facility], no answer. Prescriptions given to EMS.       Jim Morales RN  10/17/22 2497

## 2022-10-17 NOTE — ED PROVIDER NOTES
EKG:  Read by me in the absence of a cardiologist shows: Sinus bradycardia, rate 58, short NH, left anterior fascicular block, left axis, nonspecific ST-T wave abnormality, minimal change when compared to November 2019    I did not perform face-to-face evaluation and was not otherwise involved in this patient's care. Please see PA note for further information on this visit.         Ryann López MD  10/17/22 [id number]

## 2022-10-18 NOTE — ED PROVIDER NOTES
I did not perform history or physical on [employer]. This patient was seen independently by an MARISELA. I did review the EKG, which is documented below. EKG  The Ekg interpreted by me in the absence of a cardiologist shows. sinus bradycardia, rate=58    Axis is   Left axis deviation  QTc is  normal  LAFB   No specific ST-T wave changes appreciated. No evidence of acute ischemia.    No significant change from prior EKG dated 11/22/2019          Erica Isbell MD  10/17/22 2002

## 2022-10-19 LAB
EKG ATRIAL RATE: 58 BPM
EKG DIAGNOSIS: NORMAL
EKG P AXIS: 14 DEGREES
EKG P-R INTERVAL: 90 MS
EKG Q-T INTERVAL: 462 MS
EKG QRS DURATION: 82 MS
EKG QTC CALCULATION (BAZETT): 453 MS
EKG R AXIS: -46 DEGREES
EKG T AXIS: 37 DEGREES
EKG VENTRICULAR RATE: 58 BPM

## 2022-10-19 PROCEDURE — 93010 ELECTROCARDIOGRAM REPORT: CPT | Performed by: INTERNAL MEDICINE

## 2022-10-20 ENCOUNTER — OFFICE VISIT (OUTPATIENT)
Dept: ORTHOPEDIC SURGERY | Age: 82
End: 2022-10-20
Payer: MEDICARE

## 2022-10-20 VITALS — BODY MASS INDEX: 23.92 KG/M2 | WEIGHT: 135 LBS | HEIGHT: 63 IN

## 2022-10-20 DIAGNOSIS — M25.552 LEFT HIP PAIN: Primary | ICD-10-CM

## 2022-10-20 PROCEDURE — G8420 CALC BMI NORM PARAMETERS: HCPCS | Performed by: ORTHOPAEDIC SURGERY

## 2022-10-20 PROCEDURE — G8484 FLU IMMUNIZE NO ADMIN: HCPCS | Performed by: ORTHOPAEDIC SURGERY

## 2022-10-20 PROCEDURE — 99203 OFFICE O/P NEW LOW 30 MIN: CPT | Performed by: ORTHOPAEDIC SURGERY

## 2022-10-20 PROCEDURE — 1123F ACP DISCUSS/DSCN MKR DOCD: CPT | Performed by: ORTHOPAEDIC SURGERY

## 2022-10-20 PROCEDURE — 1036F TOBACCO NON-USER: CPT | Performed by: ORTHOPAEDIC SURGERY

## 2022-10-20 PROCEDURE — G8399 PT W/DXA RESULTS DOCUMENT: HCPCS | Performed by: ORTHOPAEDIC SURGERY

## 2022-10-20 PROCEDURE — G8427 DOCREV CUR MEDS BY ELIG CLIN: HCPCS | Performed by: ORTHOPAEDIC SURGERY

## 2022-10-20 PROCEDURE — 1090F PRES/ABSN URINE INCON ASSESS: CPT | Performed by: ORTHOPAEDIC SURGERY

## 2022-10-24 ENCOUNTER — OFFICE VISIT (OUTPATIENT)
Dept: ORTHOPEDIC SURGERY | Age: 82
End: 2022-10-24
Payer: MEDICARE

## 2022-10-24 VITALS — WEIGHT: 127 LBS | HEIGHT: 63 IN | BODY MASS INDEX: 22.5 KG/M2

## 2022-10-24 DIAGNOSIS — M16.12 ARTHRITIS OF LEFT HIP: Primary | ICD-10-CM

## 2022-10-24 DIAGNOSIS — M25.552 LEFT HIP PAIN: ICD-10-CM

## 2022-10-24 PROCEDURE — 1123F ACP DISCUSS/DSCN MKR DOCD: CPT | Performed by: ORTHOPAEDIC SURGERY

## 2022-10-24 PROCEDURE — G8484 FLU IMMUNIZE NO ADMIN: HCPCS | Performed by: ORTHOPAEDIC SURGERY

## 2022-10-24 PROCEDURE — G8420 CALC BMI NORM PARAMETERS: HCPCS | Performed by: ORTHOPAEDIC SURGERY

## 2022-10-24 PROCEDURE — 1036F TOBACCO NON-USER: CPT | Performed by: ORTHOPAEDIC SURGERY

## 2022-10-24 PROCEDURE — 1090F PRES/ABSN URINE INCON ASSESS: CPT | Performed by: ORTHOPAEDIC SURGERY

## 2022-10-24 PROCEDURE — G8399 PT W/DXA RESULTS DOCUMENT: HCPCS | Performed by: ORTHOPAEDIC SURGERY

## 2022-10-24 PROCEDURE — 20610 DRAIN/INJ JOINT/BURSA W/O US: CPT | Performed by: ORTHOPAEDIC SURGERY

## 2022-10-24 PROCEDURE — G8428 CUR MEDS NOT DOCUMENT: HCPCS | Performed by: ORTHOPAEDIC SURGERY

## 2022-10-24 RX ORDER — LIDOCAINE HYDROCHLORIDE 10 MG/ML
20 INJECTION, SOLUTION INFILTRATION; PERINEURAL ONCE
Status: COMPLETED | OUTPATIENT
Start: 2022-10-24 | End: 2022-10-24

## 2022-10-24 RX ORDER — TRIAMCINOLONE ACETONIDE 40 MG/ML
40 INJECTION, SUSPENSION INTRA-ARTICULAR; INTRAMUSCULAR ONCE
Status: COMPLETED | OUTPATIENT
Start: 2022-10-24 | End: 2022-10-24

## 2022-10-24 RX ADMIN — TRIAMCINOLONE ACETONIDE 40 MG: 40 INJECTION, SUSPENSION INTRA-ARTICULAR; INTRAMUSCULAR at 11:06

## 2022-10-24 RX ADMIN — LIDOCAINE HYDROCHLORIDE 20 ML: 10 INJECTION, SOLUTION INFILTRATION; PERINEURAL at 11:05

## 2022-10-24 NOTE — PROGRESS NOTES
10/20/2022     Reason for visit:  Left hip pain    History of Present Illness: The patient is an 21-year-old female who presents for evaluation of her left hip. She reports several months of pain with recent worsening. She has difficulty bearing weight. The pain is diffuse about the hip including anterior and deep as well as the groin region.       Medical History:  Past Medical History:   Diagnosis Date    Abdominal aneurysm     Anemia     Anesthesia complication     pt got confused after anes    Aortic aneurysm ([address]     unspecified site    Arthritis     At risk for falls 09/11/2018    per pt and pt's son subjective reports     Ataxia     Avascular necrosis ([address]     bilateral shoulders    Back pain     Bipolar 1 disorder ([address]     Blood transfusion 2003, 2009    during knee and hip replacement    CAD (coronary artery disease)     Chronic kidney disease     multiple calcium kidney stones 10 years ago    Clostridium difficile infection 08/2011; 1/21/13 4/29/14; 6/25/14    Colitis due to Clostridium difficile 4-    Confusion 5/18/2012    COPD (chronic obstructive pulmonary disease) ([address]     Dementia (HCC)     Depression     Diabetes ([address]     Diabetes mellitus ([address]     Diarrhea     Dysuria     Fracture of right acetabulum (HCC)     posterior column    GERD (gastroesophageal reflux disease)     Gout     H/O migraine     Hyperlipidemia     Hypertension     Liver disease     Major depressive disorder     Neuropathy     Other disorders of kidney and ureter     kidney stones    Pneumonia     2009    Prolonged emergence from general anesthesia     Shoulder (girdle) dystocia during labor and deliver, delivered     Type II or unspecified type diabetes mellitus without mention of complication, not stated as uncontrolled     UTI (urinary tract infection)     Vitamin D deficiency     Weakness     Wrist fracture     right      Past Surgical History:   Procedure Laterality Date    ABDOMEN SURGERY ABDOMINAL AORTIC ANEURYSM REPAIR  2013    aorto bifemoral bypass    APPENDECTOMY  1960    BACK SURGERY      CAROTID ENDARTERECTOMY  11    right    CHOLECYSTECTOMY      COLONOSCOPY      diverticulitis; bowel surgery    ENDOSCOPY, COLON, DIAGNOSTIC      egd-dilated esoughagous    ERCP  2013    ercp with stent placement    EYE SURGERY      cataract b/l    FRACTURE SURGERY      left elbow     HYSTERECTOMY (CERVIX STATUS UNKNOWN)      JOINT REPLACEMENT      rt hip and lt knee    JOINT REPLACEMENT Left 2016    sholuder    KIDNEY STONE SURGERY      removed abcess lt kidney and stone    [place] TOTAL KNEE ARTHROPLASTY Right 10/23/2018    RIGHT TOTAL KNEE ARTHROPLASTY - Vaughn Jeffries STANDARD performed by Hawk Cardoza MD at / [place] [id number] Right 2017    R reverse TSA with bone grafting of complex glenoid deficiency with biceps tenodesis    TONSILLECTOMY      as child    UPPER GASTROINTESTINAL ENDOSCOPY  14    VASCULAR SURGERY      WRIST SURGERY Right 16    ORIF right distal radius      Family History   Problem Relation Age of Onset    Early Death Father     Substance Abuse Father     High Blood Pressure Sister     High Blood Pressure Brother     Dementia Brother     High Blood Pressure Sister     Diabetes Sister     High Blood Pressure Sister     Diabetes Mother     Cancer Mother       Social History     Socioeconomic History    Marital status:       Spouse name: Not on file    Number of children: 2    Years of education: 15    Highest education level: Not on file   Occupational History    Not on file   Tobacco Use    Smoking status: Former     Packs/day: 0.10     Years: 50.00     Pack years: 5.00     Types: Cigarettes     Quit date: 2015     Years since quittin.8    Smokeless tobacco: Never   Vaping Use    Vaping Use: Never used   Substance and Sexual Activity    Alcohol use: No     Alcohol/week: 0.0 standard drinks     Comment: rare    Drug use: No    Sexual activity: Not Currently   Other Topics Concern    Not on file   Social History Narrative    Not on file     Social Determinants of Health     Financial Resource Strain: Not on file   Food Insecurity: Not on file   Transportation Needs: Not on file   Physical Activity: Not on file   Stress: Not on file   Social Connections: Not on file   Intimate Partner Violence: Not on file   Housing Stability: Not on file      Current Outpatient Medications on File Prior to Visit   Medication Sig Dispense Refill    bethanechol (URECHOLINE) 25 MG tablet       donepezil (ARICEPT) 5 MG tablet       lactobacillus (CULTURELLE) CAPS capsule Take 1 capsule by mouth 2 times daily      atorvastatin (LIPITOR) 10 MG tablet Take 10 mg by mouth nightly      calcium-vitamin D (OSCAL-500) 500-200 MG-UNIT per tablet Take 1 tablet by mouth 2 times daily      potassium chloride (KLOR-CON M) 20 MEQ extended release tablet Take 20 mEq by mouth 2 times daily      risperiDONE (RISPERDAL) 0.25 MG tablet Take 0.25 mg by mouth 2 times daily      pantoprazole (PROTONIX) 40 MG tablet Take 40 mg by mouth nightly      traMADol (ULTRAM) 50 MG tablet Take 50 mg by mouth every 6 hours as needed for Pain. miconazole (MICOTIN) 2 % powder Apply topically 2 times daily.  45 g 1    aspirin 81 MG chewable tablet Take 81 mg by mouth daily      magnesium oxide (MAG-OX) 400 MG tablet Take 400 mg by mouth daily      acetaminophen (TYLENOL) 325 MG tablet Take 650 mg by mouth every 6 hours as needed for Pain      midodrine (PROAMATINE) 10 MG tablet Take 1 tablet by mouth 3 times daily (with meals) 90 tablet 3    alendronate (FOSAMAX) 70 MG tablet Take 1 tablet by mouth every 7 days 4 tablet 0    escitalopram (LEXAPRO) 10 MG tablet Take 10 mg by mouth daily      metFORMIN (GLUCOPHAGE) 500 MG tablet Take 500 mg by mouth 2 times daily (with meals)      LYRICA 75 MG capsule TAKE ONE CAPSULE BY MOUTH TWICE DAILY 180 capsule 0    allopurinol (ZYLOPRIM) 300 MG tablet TAKE 1 TABLET BY MOUTH ONCE DAILY 90 tablet 0     No current facility-administered medications on file prior to visit. Allergies   Allergen Reactions    Clindamycin/Lincomycin Diarrhea     Hx of C.diff, Hx of fecal transplant, clindamycin contraindicated always. Penicillins Hives    Ambien [Zolpidem Tartrate] Other (See Comments)     confusion    Chantix [Varenicline]      Visual and auditory hallucinations    Pentazocine Lactate Other (See Comments)     Severe headache    Sulfa Antibiotics Hives    Zetia [Ezetimibe] Itching        Review of Systems:  Constitutional: Patient is adequately groomed with no evidence of malnutrition  Mental Status: The patient is oriented to time, place and person. The patient's mood and affect are appropriate. Lymphatic: The lymphatic examination bilaterally reveals all areas to be without enlargement or induration. Vascular: Examination reveals no swelling or calf tenderness. Peripheral pulses are palpable and 2+. Neurological: The patient has good coordination. There is no weakness or sensory deficit. Skin:  Head/Neck: inspection reveals no rashes, ulcerations or lesions. Trunk: inspection reveals no rashes, ulcerations or lesions. Objective:  Ht 5' 3\" (1.6 m)   Wt 135 lb (61.2 kg)   BMI 23.91 kg/m²      Physical Exam:  The patient is well-appearing and in no apparent distress  Examination of the left hip  Limited range of motion with 90 degrees of flexion and internal rotation to 0, pain with internal rotation, external Tatian of 20 to 30 degrees  5 out of 5 strength throughout distal muscle groups  Sensation is intact to light touch throughout all distributions  There is no calf swelling or tenderness  Palpable DP pulse, brisk cap refill, 2+ symmetric reflexes     Imaging:  AP pelvis x-ray and 2 view x-rays of the left hip were obtained in the office today on 10/20/2022 and reviewed. There is no fracture or dislocation.   Degenerative changes present with decreased joint space and osteophyte formation. The right side does demonstrate a total hip arthroplasty is intact. Assessment:  Left hip degenerative joint disease    Plan:  I had a long discussion with the patient. She does have fairly advanced degenerative disease of her left hip. I am convinced this is causing all of her symptoms. As result I will send her to 1 my partners to discuss further nonoperative as well as operative management. Greater than 30 minutes were spent with this encounter. Time spent included evaluating the patient's chart prior to arrival.  Evaluating the patient in the office including history, physical examination, imaging reviewing, and counseling on next steps. Lastly, time was spent discussing orders with my staff as well as providing documentation in the chart. Jonathan Irving MD            Orthopaedic Surgery Sports Medicine and [address] [facility] [address] and [address] [facility]            Team Physician [facility] ([id number])      Disclaimer: This note was dictated with voice recognition software. Though review and correction are routine, we apologize for any errors.

## 2022-10-24 NOTE — PROGRESS NOTES
Patient: Dale Quintero  : 1940    MRN: [id number]    Date of Visit: 10/24/22    Attending Physician: Quintella Landau MD    History of Present Illness    Ms. Alexander Jarvis is a very pleasant [de-identified] y.o. patient with a long history of left hip pain. She complains of lateral hip pain as well as groin and buttock pain. She says this is worse with weightbearing she has there is no radiation of the pain down the leg she denies fevers or chills. She denies weakness. She previously had a right total hip replacement that was uncomplicated. Of note she uses a walker to ambulate however is mostly wheelchair-bound and does live at a long-term facility.         PMH/PSH:  Past Medical History:   Diagnosis Date    Abdominal aneurysm     Anemia     Anesthesia complication     pt got confused after anes    Aortic aneurysm ([address]     unspecified site    Arthritis     At risk for falls 2018    per pt and pt's son subjective reports     Ataxia     Avascular necrosis ([address]     bilateral shoulders    Back pain     Bipolar 1 disorder ([address]     Blood transfusion ,     during knee and hip replacement    CAD (coronary artery disease)     Chronic kidney disease     multiple calcium kidney stones 10 years ago    Clostridium difficile infection 2011; 13; 14    Colitis due to Clostridium difficile 2011    Confusion 2012    COPD (chronic obstructive pulmonary disease) ([address]     Dementia (HCC)     Depression     Diabetes ([address]     Diabetes mellitus ([address]     Diarrhea     Dysuria     Fracture of right acetabulum (HCC)     posterior column    GERD (gastroesophageal reflux disease)     Gout     H/O migraine     Hyperlipidemia     Hypertension     Liver disease     Major depressive disorder     Neuropathy     Other disorders of kidney and ureter     kidney stones    Pneumonia         Prolonged emergence from general anesthesia     Shoulder (girdle) dystocia during labor and deliver, delivered     Type II or unspecified type diabetes mellitus without mention of complication, not stated as uncontrolled     UTI (urinary tract infection)     Vitamin D deficiency     Weakness     Wrist fracture     right     Patient Active Problem List   Diagnosis    Diarrhea    Hypokalemia    Carotid bruit    Clostridium difficile colitis    COPD (chronic obstructive pulmonary disease) (HCC)    Cataract    Confusion    Abdominal pain    Vomiting    Liver function test abnormality    Abdominal aortic aneurysm dissection (HCC)    Arthritis, shoulder region    Altered mental status    DDD (degenerative disc disease), cervical    Colitis    Gastritis    Delirium    Severe sepsis ([address]    2/12/16 ORIF distal radius    Wrist fracture    Hyperlipidemia    DM2 (diabetes mellitus, type 2) (HCC)    Macrocytosis without anemia    Arthritis of left shoulder region    Biceps tendonitis on left    S/p reverse total shoulder arthroplasty    Arthritis of shoulder region, right    Bipolar disorder ([address]    10/23/18 RIGHT TKA    Acute blood loss as cause of postoperative anemia    Thrombocytopenia (HCC)    Hypotension    Septic shock (HCC)    Dementia (HCC)    Acute encephalopathy    Allergic sinusitis    Mitral valve insufficiency    Chest pain    Essential hypertension    Palpitations    Stenosis of carotid artery     Past Surgical History:   Procedure Laterality Date    ABDOMEN SURGERY      ABDOMINAL AORTIC ANEURYSM REPAIR  8/5/2013    aorto bifemoral bypass    APPENDECTOMY  1960    BACK SURGERY      CAROTID ENDARTERECTOMY  5/16/11    right    CHOLECYSTECTOMY      COLONOSCOPY      diverticulitis; bowel surgery    ENDOSCOPY, COLON, DIAGNOSTIC      egd-dilated esoughagous    ERCP  2-1-2013    ercp with stent placement    EYE SURGERY      cataract b/l    FRACTURE SURGERY      left elbow 1992    HYSTERECTOMY (CERVIX STATUS UNKNOWN)      JOINT REPLACEMENT      rt hip and lt knee    JOINT REPLACEMENT Left 09/28/2016    vitor KIDNEY STONE SURGERY      removed abcess lt kidney and stone    [place] TOTAL KNEE ARTHROPLASTY Right 10/23/2018    RIGHT TOTAL KNEE ARTHROPLASTY - SYED STANDARD performed by Everton Sotelo MD at [address] Right 05/02/2017    R reverse TSA with bone grafting of complex glenoid deficiency with biceps tenodesis    TONSILLECTOMY      as child    UPPER GASTROINTESTINAL ENDOSCOPY  4-28-14    VASCULAR SURGERY      WRIST SURGERY Right 2/12/16    ORIF right distal radius       MEDS:  Scheduled Meds:  Continuous Infusions:  PRN Meds:  Current Meds:No current outpatient medications on file. ALLERGIES:  No Known Allergies      Social History:   Social History     Socioeconomic History    Marital status: Single     Spouse name: Not on file    Number of children: Not on file    Years of education: Not on file    Highest education level: Not on file   Social Needs    Financial resource strain: Not on file    Food insecurity - worry: Not on file    Food insecurity - inability: Not on file    Transportation needs - medical: Not on file    Transportation needs - non-medical: Not on file   Occupational History    Not on file   Tobacco Use    Smoking status: Never Smoker    Smokeless tobacco: Never Used   Substance and Sexual Activity    Alcohol use: No     Frequency: Never    Drug use: No    Sexual activity: Not on file   Other Topics Concern    Not on file   Social History Narrative    Not on file         Family History:   No family history on file. Review of Systems:  No personal history of DVT, PE. 12 point ROS otherwise negative other than reported in HPI. Physical Examination:  Patient is alert and oriented x 3 and appears well nourished and appropriate for today's visit. Gait: The patient walks with an antalgic gait. Left hip: There is notable tenderness in the musculature around the hip including the anterior as well as lateral hip she does have tenderness in her hip bursa.   She also has what she describes as buttock pain with motion internal and external rotation of the hip. She is able to perform straight leg raise positive Stinchfield. Peripheral vasculature: Bilateral 2+ dorsal pedis pulses; bilateral 2+ posterior tibial pulses; No lower extremity edema  Skin: Skin appears to be intact in both upper and lower extremities. There does not appear to be any ulceration or other non-healing wounds. Radiographs: Multiple views of the left hip and pelvis including AP and lateral of the left hip show a severely arthritic left hip with joint space narrowing subchondral sclerosis and osteophyte formation, there additionally is a right total hip replacement that appears to be well-positioned. Assessment and Plan?: The patient has left hip pain combination of arthritic pain as well as bursitis. .   We discussed the diagnosis and treatment options in detail with the patient. Regarding her bursitis we will proceed with a injection of the left hip bursa today additionally we discussed Tylenol for her discomfort. We will refer her to Dr. Cornelius Ahmadi for a left hip injection  At this point I do not believe she is a good candidate for a hip replacement given her comorbidities functional status. Joint Injection: risks and benefits were discussed with the patient, after preparation of the injection site with alcohol, a combination of 1cc kenelog and 4cc marcaine totaling 5 cc was injected into the Left hip bursa. The procedure was tolerated well without adverse reaction. The patient was counseled on potential reactions to the injection and given information on follow up and when to seek medical attention. The patient will monitor how long relief persists.

## (undated) DEVICE — COVER,MAYO STAND,XL,STERILE: Brand: MEDLINE

## (undated) DEVICE — SUTURE MCRYL SZ 3-0 L27IN ABSRB UD L24MM PS-1 3/8 CIR PRIM Y936H

## (undated) DEVICE — DRESSING CNTCT 4X12IN IS THERABOND 3D

## (undated) DEVICE — 2108 SERIES SAGITTAL BLADE FAN, OFFSET  (29.0 X 0.89 X 73.0MM)

## (undated) DEVICE — FAIRFIELD KNEE LF

## (undated) DEVICE — 19 IN KNEE IMMOBILIZER: Brand: DEROYAL

## (undated) DEVICE — GLOVE ORANGE PI 8 1/2   MSG9085

## (undated) DEVICE — YANKAUER OPEN TIP, NO VENT: Brand: ARGYLE

## (undated) DEVICE — 3M™ STERI-DRAPE™ INSTRUMENT POUCH 1018: Brand: STERI-DRAPE™

## (undated) DEVICE — HOOD: Brand: FLYTE

## (undated) DEVICE — TOTAL BASIC PK

## (undated) DEVICE — SUTURE VCRL SZ 2-0 L18IN ABSRB UD CT-1 L36MM 1/2 CIR J839D

## (undated) DEVICE — 450 ML BOTTLE OF 0.05% CHLORHEXIDINE GLUCONATE IN 99.95% STERILE WATER FOR IRRIGATION, USP AND APPLICATOR.: Brand: IRRISEPT ANTIMICROBIAL WOUND LAVAGE

## (undated) DEVICE — HANDPIECE SET WITH HIGH FLOW TIP AND SUCTION TUBE: Brand: INTERPULSE

## (undated) DEVICE — ZIMMER® A.T.S. CYCLINDRICAL TOURNIQUET CUFF, SINGLE PORT, SINGLE BLADDER 24 IN. 61 CM)

## (undated) DEVICE — DRAPE,U/ SHT,SPLIT,PLAS,STERIL: Brand: MEDLINE

## (undated) DEVICE — GLOVE SURG SZ 8.5 L11.85IN FNGR THK9.8MIL STRW LTX POLYMER

## (undated) DEVICE — PADDING UNDERCAST W4INXL4YD 100% COT CRIMPED FINISH WBRL II

## (undated) DEVICE — NEEDLE HYPO 22GA L1.5IN BLK POLYPR HUB S STL REG BVL STR

## (undated) DEVICE — SUTURE VCRL SZ 0 L18IN ABSRB UD L36MM CT-1 1/2 CIR J840D

## (undated) DEVICE — SYSTEM SKIN CLSR 22CM DERMBND PRINEO

## (undated) DEVICE — 2108 SERIES SAGITTAL BLADE (19.5 X 1.27 X 90.0MM)

## (undated) DEVICE — STERILE TOTAL KNEE DRAPE PACK: Brand: CARDINAL HEALTH

## (undated) DEVICE — CATHETER TRAY 16 FR 5 CC FOL ANTIREFLX SAMPLING PRT DOVER

## (undated) DEVICE — HOOD, PEEL-AWAY: Brand: FLYTE

## (undated) DEVICE — DECANTER FLD 9IN ST BG FOR ASEP TRNSF OF FLD

## (undated) DEVICE — Device: Brand: STABLECUT®

## (undated) DEVICE — PEN: MARKING STD 100/CS: Brand: MEDICAL ACTION INDUSTRIES

## (undated) DEVICE — SUTURE STRATAFIX SZ 1 L14IN ABSRB VLT L36MM MO-4 TAPERPOINT SXPD2B400

## (undated) DEVICE — HEADLESS TROCHAR PIN 75MM: Brand: ZUK

## (undated) DEVICE — SPONGE LAP W18XL18IN WHT COT 4 PLY FLD STRUNG RADPQ DISP ST

## (undated) DEVICE — 35 ML SYRINGE LUER-LOCK TIP: Brand: MONOJECT

## (undated) DEVICE — CHLORAPREP 26ML ORANGE

## (undated) DEVICE — SHEET,DRAPE,53X77,STERILE: Brand: MEDLINE